# Patient Record
Sex: FEMALE | Race: BLACK OR AFRICAN AMERICAN | NOT HISPANIC OR LATINO | URBAN - METROPOLITAN AREA
[De-identification: names, ages, dates, MRNs, and addresses within clinical notes are randomized per-mention and may not be internally consistent; named-entity substitution may affect disease eponyms.]

---

## 2023-03-07 LAB
CHLAMYDIA TRACH., AMPLIFIED: NEGATIVE
N. GONORRHEA, AMPLIFIED: NEGATIVE
TRICHOMONAS VAGINALIS: NEGATIVE

## 2023-03-08 LAB
ALBUMIN (G/DL) IN SER/PLAS: 4.3 G/DL (ref 3.4–5)
ANION GAP IN SER/PLAS: 12 MMOL/L (ref 10–20)
CALCIUM (MG/DL) IN SER/PLAS: 9.8 MG/DL (ref 8.6–10.6)
CARBON DIOXIDE, TOTAL (MMOL/L) IN SER/PLAS: 28 MMOL/L (ref 21–32)
CHLORIDE (MMOL/L) IN SER/PLAS: 104 MMOL/L (ref 98–107)
CREATININE (MG/DL) IN SER/PLAS: 0.69 MG/DL (ref 0.5–1.05)
GFR FEMALE: >90 ML/MIN/1.73M2
GLUCOSE (MG/DL) IN SER/PLAS: 100 MG/DL (ref 74–99)
PHOSPHATE (MG/DL) IN SER/PLAS: 4 MG/DL (ref 2.5–4.9)
POTASSIUM (MMOL/L) IN SER/PLAS: 4.5 MMOL/L (ref 3.5–5.3)
SODIUM (MMOL/L) IN SER/PLAS: 139 MMOL/L (ref 136–145)
UREA NITROGEN (MG/DL) IN SER/PLAS: 13 MG/DL (ref 6–23)

## 2023-10-06 ENCOUNTER — TELEPHONE (OUTPATIENT)
Dept: PRIMARY CARE | Facility: HOSPITAL | Age: 68
End: 2023-10-06
Payer: COMMERCIAL

## 2023-10-13 ENCOUNTER — PHARMACY VISIT (OUTPATIENT)
Dept: PHARMACY | Facility: CLINIC | Age: 68
End: 2023-10-13
Payer: COMMERCIAL

## 2023-10-13 PROCEDURE — RXMED WILLOW AMBULATORY MEDICATION CHARGE

## 2023-10-19 ENCOUNTER — PHARMACY VISIT (OUTPATIENT)
Dept: PHARMACY | Facility: CLINIC | Age: 68
End: 2023-10-19
Payer: COMMERCIAL

## 2023-10-19 PROCEDURE — RXMED WILLOW AMBULATORY MEDICATION CHARGE

## 2023-11-16 ENCOUNTER — PHARMACY VISIT (OUTPATIENT)
Dept: PHARMACY | Facility: CLINIC | Age: 68
End: 2023-11-16
Payer: COMMERCIAL

## 2023-11-16 PROCEDURE — RXMED WILLOW AMBULATORY MEDICATION CHARGE

## 2023-11-22 ENCOUNTER — OFFICE VISIT (OUTPATIENT)
Dept: PRIMARY CARE | Facility: HOSPITAL | Age: 68
End: 2023-11-22
Payer: COMMERCIAL

## 2023-11-22 VITALS
HEIGHT: 64 IN | TEMPERATURE: 98.4 F | BODY MASS INDEX: 27.66 KG/M2 | WEIGHT: 162 LBS | OXYGEN SATURATION: 99 % | HEART RATE: 60 BPM | DIASTOLIC BLOOD PRESSURE: 89 MMHG | SYSTOLIC BLOOD PRESSURE: 152 MMHG

## 2023-11-22 DIAGNOSIS — Z12.2 SCREENING FOR LUNG CANCER: ICD-10-CM

## 2023-11-22 DIAGNOSIS — E55.9 VITAMIN D DEFICIENCY: Primary | ICD-10-CM

## 2023-11-22 DIAGNOSIS — E78.00 PURE HYPERCHOLESTEROLEMIA: ICD-10-CM

## 2023-11-22 DIAGNOSIS — Z87.891 PERSONAL HISTORY OF NICOTINE DEPENDENCE: ICD-10-CM

## 2023-11-22 DIAGNOSIS — R87.622 PAP SMEAR ABNORMALITY OF VAGINA WITH LGSIL: ICD-10-CM

## 2023-11-22 DIAGNOSIS — R73.03 PRE-DIABETES: ICD-10-CM

## 2023-11-22 LAB
25(OH)D3 SERPL-MCNC: 89 NG/ML (ref 30–100)
ALBUMIN SERPL BCP-MCNC: 4.4 G/DL (ref 3.4–5)
ALP SERPL-CCNC: 78 U/L (ref 33–136)
ALT SERPL W P-5'-P-CCNC: 35 U/L (ref 7–45)
ANION GAP SERPL CALC-SCNC: 14 MMOL/L (ref 10–20)
AST SERPL W P-5'-P-CCNC: 24 U/L (ref 9–39)
BASOPHILS # BLD AUTO: 0.03 X10*3/UL (ref 0–0.1)
BASOPHILS NFR BLD AUTO: 0.6 %
BILIRUB SERPL-MCNC: 0.4 MG/DL (ref 0–1.2)
BUN SERPL-MCNC: 14 MG/DL (ref 6–23)
CALCIUM SERPL-MCNC: 9.3 MG/DL (ref 8.6–10.6)
CHLORIDE SERPL-SCNC: 104 MMOL/L (ref 98–107)
CHOLEST SERPL-MCNC: 241 MG/DL (ref 0–199)
CHOLESTEROL/HDL RATIO: 4.1
CO2 SERPL-SCNC: 26 MMOL/L (ref 21–32)
CREAT SERPL-MCNC: 0.64 MG/DL (ref 0.5–1.05)
EOSINOPHIL # BLD AUTO: 0.05 X10*3/UL (ref 0–0.7)
EOSINOPHIL NFR BLD AUTO: 1 %
ERYTHROCYTE [DISTWIDTH] IN BLOOD BY AUTOMATED COUNT: 12.8 % (ref 11.5–14.5)
EST. AVERAGE GLUCOSE BLD GHB EST-MCNC: 117 MG/DL
GFR SERPL CREATININE-BSD FRML MDRD: >90 ML/MIN/1.73M*2
GLUCOSE SERPL-MCNC: 92 MG/DL (ref 74–99)
HBA1C MFR BLD: 5.7 %
HCT VFR BLD AUTO: 37.6 % (ref 36–46)
HDLC SERPL-MCNC: 59.3 MG/DL
HGB BLD-MCNC: 12 G/DL (ref 12–16)
IMM GRANULOCYTES # BLD AUTO: 0.01 X10*3/UL (ref 0–0.7)
IMM GRANULOCYTES NFR BLD AUTO: 0.2 % (ref 0–0.9)
LYMPHOCYTES # BLD AUTO: 1.84 X10*3/UL (ref 1.2–4.8)
LYMPHOCYTES NFR BLD AUTO: 37.9 %
MCH RBC QN AUTO: 31.5 PG (ref 26–34)
MCHC RBC AUTO-ENTMCNC: 31.9 G/DL (ref 32–36)
MCV RBC AUTO: 99 FL (ref 80–100)
MONOCYTES # BLD AUTO: 0.45 X10*3/UL (ref 0.1–1)
MONOCYTES NFR BLD AUTO: 9.3 %
NEUTROPHILS # BLD AUTO: 2.48 X10*3/UL (ref 1.2–7.7)
NEUTROPHILS NFR BLD AUTO: 51 %
NON-HDL CHOLESTEROL: 182 MG/DL (ref 0–149)
NRBC BLD-RTO: 0 /100 WBCS (ref 0–0)
PLATELET # BLD AUTO: 364 X10*3/UL (ref 150–450)
POTASSIUM SERPL-SCNC: 4.5 MMOL/L (ref 3.5–5.3)
PROT SERPL-MCNC: 7.4 G/DL (ref 6.4–8.2)
RBC # BLD AUTO: 3.81 X10*6/UL (ref 4–5.2)
SODIUM SERPL-SCNC: 139 MMOL/L (ref 136–145)
WBC # BLD AUTO: 4.9 X10*3/UL (ref 4.4–11.3)

## 2023-11-22 PROCEDURE — 80053 COMPREHEN METABOLIC PANEL: CPT | Performed by: INTERNAL MEDICINE

## 2023-11-22 PROCEDURE — 36415 COLL VENOUS BLD VENIPUNCTURE: CPT

## 2023-11-22 PROCEDURE — 83718 ASSAY OF LIPOPROTEIN: CPT

## 2023-11-22 PROCEDURE — 99214 OFFICE O/P EST MOD 30 MIN: CPT | Mod: GC

## 2023-11-22 PROCEDURE — 3077F SYST BP >= 140 MM HG: CPT

## 2023-11-22 PROCEDURE — 82465 ASSAY BLD/SERUM CHOLESTEROL: CPT | Performed by: INTERNAL MEDICINE

## 2023-11-22 PROCEDURE — 99214 OFFICE O/P EST MOD 30 MIN: CPT

## 2023-11-22 PROCEDURE — 82306 VITAMIN D 25 HYDROXY: CPT | Performed by: INTERNAL MEDICINE

## 2023-11-22 PROCEDURE — 3079F DIAST BP 80-89 MM HG: CPT

## 2023-11-22 PROCEDURE — 1126F AMNT PAIN NOTED NONE PRSNT: CPT

## 2023-11-22 PROCEDURE — 80061 LIPID PANEL: CPT

## 2023-11-22 PROCEDURE — 85025 COMPLETE CBC W/AUTO DIFF WBC: CPT | Performed by: INTERNAL MEDICINE

## 2023-11-22 PROCEDURE — 83036 HEMOGLOBIN GLYCOSYLATED A1C: CPT | Performed by: INTERNAL MEDICINE

## 2023-11-22 RX ORDER — ALBUTEROL SULFATE 2 MG/5ML
2 SYRUP ORAL EVERY 6 HOURS
COMMUNITY
End: 2024-05-08 | Stop reason: WASHOUT

## 2023-11-22 RX ORDER — LOSARTAN POTASSIUM 50 MG/1
50 TABLET ORAL DAILY
COMMUNITY
End: 2023-12-20 | Stop reason: SDUPTHER

## 2023-11-22 ASSESSMENT — LIFESTYLE VARIABLES
SKIP TO QUESTIONS 9-10: 1
HOW OFTEN DO YOU HAVE A DRINK CONTAINING ALCOHOL: NEVER
HOW OFTEN DO YOU HAVE SIX OR MORE DRINKS ON ONE OCCASION: NEVER
AUDIT-C TOTAL SCORE: 0
HOW MANY STANDARD DRINKS CONTAINING ALCOHOL DO YOU HAVE ON A TYPICAL DAY: PATIENT DOES NOT DRINK

## 2023-11-22 ASSESSMENT — ENCOUNTER SYMPTOMS
OCCASIONAL FEELINGS OF UNSTEADINESS: 0
LOSS OF SENSATION IN FEET: 0
DEPRESSION: 0

## 2023-11-22 ASSESSMENT — PAIN SCALES - GENERAL: PAINLEVEL: 0-NO PAIN

## 2023-11-22 ASSESSMENT — PATIENT HEALTH QUESTIONNAIRE - PHQ9
2. FEELING DOWN, DEPRESSED OR HOPELESS: NOT AT ALL
1. LITTLE INTEREST OR PLEASURE IN DOING THINGS: NOT AT ALL
SUM OF ALL RESPONSES TO PHQ9 QUESTIONS 1 & 2: 0

## 2023-11-22 NOTE — PATIENT INSTRUCTIONS
As discussed today, our plan is:     Labs - you can get this done today or come back anytime in the next 4-6 weeks at any  facility.  Medication changes : None  Consultations - you were referred to see the following specialists: OBGYN for pap smear and Low Dose CT    Please call [phone] ([phone]) to schedule your appointments and imaging.     Please come back to see me in:   ------  If you have any problems or questions, please contact the clinic at [phone] to leave a message.  Our fax number is [phone]. If your issue cannot wait until the next business day, please go to urgent care or the emergency department.     I also strongly urge all of my patients to register for [facility] by going to: [email or web address]  (The  staff can also send you a text/email link to register when you check out).    No shows: It is understandable if you are unable to make it to a visit, but please cancel your appointment instead of not showing up. This helps to give other patients access to primary care and keeps wait times low.      Dr. Patel Blount

## 2023-11-22 NOTE — PROGRESS NOTES
Chief complaint:    HPI:  Dorinda Benson is a 68 y.o. female w/ pmhx of HTN, HLD, Pre-DM and tobacco use who presents to clinic for follow up.     Pt reported that she went to [place] last week and developed cough, headache, and runny nose. Pt reported that her symptoms have been all improving. Pt has been taking mucinex pill which has been helping her symptoms. Otherwise pt reported that she has been doing well.  Pt reported that she has been taking her BP  at home and is typically in the SBP 130s. Pt reported that her mother has been doing the cooking at home potato's  and other carbs are heavy in there diet. Pt has been taking all other mediations as prescribed. Pt has not been regularly exercising. Pt reported that she has not had to use her albuterol inhaler in months.    Health maintenance:  Health Maintenance   Topic Date Due    Medicare Annual Wellness Visit (AWV)  Never done    COVID-19 Vaccine (1) Never done    Pneumococcal Vaccine: 65+ Years (1 - PCV) Never done    DTaP/Tdap/Td Vaccines (1 - Tdap) Never done    Zoster Vaccines (1 of 2) Never done    Influenza Vaccine (1) Never done    Diabetes: Hemoglobin A1C  01/12/2024    Mammogram  02/13/2024    Bone Density Scan  04/27/2024    Diabetes Screening  01/12/2026    Lipid Panel  01/24/2028    Colorectal Cancer Screening  02/22/2033    Hepatitis C Screening  Completed    HIB Vaccines  Aged Out    Hepatitis B Vaccines  Aged Out    IPV Vaccines  Aged Out    Hepatitis A Vaccines  Aged Out    Meningococcal Vaccine  Aged Out    Rotavirus Vaccines  Aged Out    HPV Vaccines  Aged Out    Irritable Bowel Syndrome  Discontinued       Medications:    Current Outpatient Medications:     albuterol 2 mg/5 mL syrup, Take 5 mL (2 mg) by mouth every 6 hours., Disp: , Rfl:     albuterol 90 mcg/actuation inhaler, INHALE 2 PUFFS BY MOUTH AND INTO THE LUNGS EVERY 4-6 HOURS AS NEEDED, Disp: 34 g, Rfl: 3    amLODIPine (Norvasc) 5 mg tablet, TAKE 1 TABLET BY MOUTH ONCE DAILY, Disp:  90 tablet, Rfl: 2    atorvastatin (Lipitor) 40 mg tablet, TAKE 1 TABLET BY MOUTH AT BEDTIME, Disp: 30 tablet, Rfl: 4    cholecalciferol (Vitamin D-3) 1,250 mcg (50,000 unit) capsule, TAKE 1 CAPSULE BY MOUTH ONCE WEEKLY, Disp: 12 capsule, Rfl: 0    fluticasone (Flonase) 50 mcg/actuation nasal spray, USE 2 SPRAYS IN EACH NOSTRIL ONCE DAILY, Disp: 48 g, Rfl: 0    losartan (Cozaar) 50 mg tablet, Take 1 tablet (50 mg) by mouth once daily., Disp: , Rfl:     Allergies:  Allergies   Allergen Reactions    Latex Hives and Itching       Past medical history:  No past medical history on file.    Surgical history:  No past surgical history on file.    Family history:  No family history on file.    Social history:   reports that she has been smoking cigarettes. She has been smoking an average of .5 packs per day. She uses smokeless tobacco.Tobacco:  4 cigs in evening time, prior was smoking 8 in the evening 48 years  EOTH: 2 beers per day  Rec:  Marijuana 1 per month    Review of systems:  Constitutional: negative for fevers, chills, weight loss, weight gain, change in appetite, fatigue, weakness.  HEENT: negative for + headache, changes in vision or hearing, congestion, sore throat.  Respiratory: negative for SOB, cough, hemoptysis, wheezing  Cardiovascular: negative for chest pain, palpitations, orthopnea, PND  GI: negative for dysphagia, abdominal pain, nausea, vomiting, diarrhea, constipation, melena, hematochezia, BRBPR  : negative for frequency, urgency, dysuria, hematuria, incontinence  MSK: negative for myalgia, arthralgia, decreased joint ROM, LE swelling  Skin: negative for rash, wounds  Heme/lymph: negative for easy bruising, bleeding, epistaxis  Neuro: negative for LOC, numbness, tingling, tremor, vertigo, dizziness    Vitals:  Vitals:    11/22/23 1518   BP: 152/89   Pulse:    Temp:    SpO2:        Physical exam:  Constitutional: Well-developed female in no acute distress.  HEENT: Normocephalic, atraumatic. PERRL.  EOMI. No cervical lymphadenopathy.  Respiratory: CTA bilaterally. No wheezes, rales, or rhonchi. Normal respiratory effort.  Cardiovascular: RRR. No murmurs, gallops, or rubs. No JVD. Radial pulses 2+.  Abdominal: Soft, nondistended, nontender to palpation. Bowel sounds present. No hepatosplenomegaly or masses. No CVA tenderness.  Neuro: CN II-XII intact. UE and LE strength 5/5 bilaterally and sensation intact. Normal FTN testing.  MSK: No LE edema bilaterally.  Skin: Warm, dry. No rashes or wounds.  Psych: Appropriate mood and affect.    Labs:  No results found for this or any previous visit (from the past 24 hour(s)).    Imaging:  No results found.    Assessment and plan:  Pt is a 68 year old lady has HTN, thyroid disease, depression/anxiety presenting for follow-up from last visit.     Addressed this visit:  -Repeated Vit D/Lipid/A1C/CBC/CMP    #Hypertension:  Has BP kit and asked to document measurements and will measure again next visit    Plan:  -Hypertensive in office today, ordered nursing visit in 1 month to recheck BP  -Continue losartan to 50mg valente and amlo 5mg     #Pre-DM:  A1C 5.9% Jan/2023, UA clear  Plan:   -Repeated at this visit, if worsening will start Metformin     #HLD:   Total Randi 250, Non-HDL 180s, ASCVD 27.2%  Plan:  -Continue Atorva 40mg daily and repeat today and might increase Atorva 80mg daily if not improving      #Smoking:  Smokes for the past > 30 years, 0.5 pack a day cut down to 3-4 cigs a day for the past year, prefers to stop on her own, does not want nicotine patches.     #Vit D deficiency:   Latest Vit D level 18, finished 50k a week for 2 months  Plan: Continue to 50k a month and repeat Vit D today     #Thyroid disease:  Unclear, likely hypothyroid, TSH wnl.  Plan:  No need for medications now, will repeat 1-2 years.     #Anxiety/Depression:  Reports taking something that didn't help prefer getting resources for now and no medications, given resource for mental health care by       #Recurrent Bronchitis   Smoker and was on albuterol and singular, Asthma versus COPD.  PFT normal, will observe and advise to stop smoking     Health Maintenance:  -Screening:   C-scope Feb/2023: some polyps removed, tubuluar adenoma, due tin 3-5 years, [id number]  Mammogram-Feb/2023-WNL- repeat 2/2024  Low dose CT need annual repeat, due in Feb/2024 (Few small bilateral noncalcified pulmonary nodules measuring up to 5mm)  Pap smear in March/2023, negative per Gyn last Pap smear patient needs, due for HPV+Pap test on 4/2024  Low dose lung cancer screening: Less than 20 pack year smoking hx     -Disease:   Osteoporosis: DEXA screening normal March/2023  Pre-DM (see above), Lipid (see above), TSH wnl, Vit D 18.     -Infection: HIV and Hep B/C negative.      -Vaccines:   Flu and Pneumococcal 20 Jan/2023, 2 Shingles and Tdap Jan/2023, due for another Shingrix in 2-6 months from Jan/24th/2023.        Follow-up in 6 months. RN visit in 1 month.     Patient and plan discussed with attending physician Dr. Bishop.    Patel Blount MD  PGY-2 Internal Medicine  [facility]

## 2023-11-24 NOTE — PROGRESS NOTES
I saw and evaluated the patient. I personally obtained the key and critical portions of the history and physical exam or was physically present for key and critical portions performed by the resident/fellow. I reviewed the resident/fellow's documentation and discussed the patient with the resident/fellow. I agree with the resident/fellow's medical decision making as documented in the note.    Yasmeen Bishop MD MPH

## 2023-11-27 LAB
CHOLEST SERPL-MCNC: 241 MG/DL (ref 0–199)
CHOLESTEROL/HDL RATIO: 4
HDLC SERPL-MCNC: 60.2 MG/DL
LDLC SERPL CALC-MCNC: 146 MG/DL
NON HDL CHOLESTEROL: 181 MG/DL (ref 0–149)
TRIGL SERPL-MCNC: 174 MG/DL (ref 0–149)
VLDL: 35 MG/DL (ref 0–40)

## 2023-11-28 ENCOUNTER — TELEPHONE (OUTPATIENT)
Dept: PRIMARY CARE | Facility: HOSPITAL | Age: 68
End: 2023-11-28
Payer: COMMERCIAL

## 2023-11-28 NOTE — TELEPHONE ENCOUNTER
Attempted to call patient on all available numbers but, patient was not reachable. Next provider please consider increasing to atorvastatin 80mg daily given cholesterol has been well controlled with the 40mg.

## 2023-12-01 PROBLEM — R80.9 PROTEINURIA: Status: ACTIVE | Noted: 2023-12-01

## 2023-12-01 PROBLEM — Z20.822 SUSPECTED SEVERE ACUTE RESPIRATORY SYNDROME CORONAVIRUS 2 (SARS-COV-2) INFECTION: Status: ACTIVE | Noted: 2021-08-12

## 2023-12-01 RX ORDER — TRIAMTERENE AND HYDROCHLOROTHIAZIDE 37.5; 25 MG/1; MG/1
CAPSULE ORAL
COMMUNITY
Start: 2023-01-24 | End: 2024-05-08 | Stop reason: WASHOUT

## 2023-12-01 RX ORDER — CETIRIZINE HYDROCHLORIDE 10 MG/1
TABLET ORAL
COMMUNITY
Start: 2017-03-28

## 2023-12-01 RX ORDER — AMOXICILLIN 500 MG/1
CAPSULE ORAL
COMMUNITY
Start: 2017-03-28 | End: 2024-05-08 | Stop reason: WASHOUT

## 2023-12-01 RX ORDER — GUAIFENESIN, PSEUDOEPHEDRINE HYDROCHLORIDE 600; 60 MG/1; MG/1
TABLET, EXTENDED RELEASE ORAL
COMMUNITY
Start: 2017-03-28 | End: 2024-05-08 | Stop reason: WASHOUT

## 2023-12-20 PROCEDURE — RXMED WILLOW AMBULATORY MEDICATION CHARGE

## 2023-12-21 ENCOUNTER — PHARMACY VISIT (OUTPATIENT)
Dept: PHARMACY | Facility: CLINIC | Age: 68
End: 2023-12-21
Payer: COMMERCIAL

## 2024-01-03 ENCOUNTER — CLINICAL SUPPORT (OUTPATIENT)
Dept: PRIMARY CARE | Facility: HOSPITAL | Age: 69
End: 2024-01-03
Payer: COMMERCIAL

## 2024-01-03 VITALS — SYSTOLIC BLOOD PRESSURE: 145 MMHG | HEART RATE: 66 BPM | DIASTOLIC BLOOD PRESSURE: 86 MMHG

## 2024-01-03 DIAGNOSIS — I10 BENIGN ESSENTIAL HYPERTENSION: ICD-10-CM

## 2024-01-03 PROCEDURE — 99211 OFF/OP EST MAY X REQ PHY/QHP: CPT | Performed by: INTERNAL MEDICINE

## 2024-01-03 PROCEDURE — 99211 OFF/OP EST MAY X REQ PHY/QHP: CPT | Mod: 25

## 2024-01-03 NOTE — PROGRESS NOTES
Dorinda Marcelino reports to Marc Jones as an established patient on the nurse's schedule for blood pressure check after an elevated bp reading at the 11/22/2023 office visit of 152/89 with a heart rate of 60. A & O x 4, ambulates independently without assistance. Identifiers x 2, in no acute distress. No fever, cough, flu or covid symptoms reported. Reports no adverse reactions or side effects to the current treatment plan of amlodipine 5 mg daily and Losartan 50 mg daily. Today's blood pressure reading is 145/86 with a heart rate of 66 . Results discussed with Dr. Rodríguez, recommendations are to continue the amlodipine 5 mg with the Losartan 50 mg daily, in addition to monitoring salt intake. Patient agreeable to the treatment plan.  Discharged from clinic in stable condition.

## 2024-01-16 PROCEDURE — RXMED WILLOW AMBULATORY MEDICATION CHARGE

## 2024-01-18 ENCOUNTER — PHARMACY VISIT (OUTPATIENT)
Dept: PHARMACY | Facility: CLINIC | Age: 69
End: 2024-01-18
Payer: COMMERCIAL

## 2024-02-16 PROCEDURE — RXMED WILLOW AMBULATORY MEDICATION CHARGE

## 2024-02-19 ENCOUNTER — PHARMACY VISIT (OUTPATIENT)
Dept: PHARMACY | Facility: CLINIC | Age: 69
End: 2024-02-19
Payer: COMMERCIAL

## 2024-03-27 PROCEDURE — RXMED WILLOW AMBULATORY MEDICATION CHARGE

## 2024-03-28 ENCOUNTER — PHARMACY VISIT (OUTPATIENT)
Dept: PHARMACY | Facility: CLINIC | Age: 69
End: 2024-03-28
Payer: COMMERCIAL

## 2024-04-10 ENCOUNTER — APPOINTMENT (OUTPATIENT)
Dept: OBSTETRICS AND GYNECOLOGY | Facility: HOSPITAL | Age: 69
End: 2024-04-10
Payer: COMMERCIAL

## 2024-04-30 PROCEDURE — RXMED WILLOW AMBULATORY MEDICATION CHARGE

## 2024-05-02 ENCOUNTER — PHARMACY VISIT (OUTPATIENT)
Dept: PHARMACY | Facility: CLINIC | Age: 69
End: 2024-05-02
Payer: COMMERCIAL

## 2024-05-03 ENCOUNTER — HOSPITAL ENCOUNTER (EMERGENCY)
Facility: HOSPITAL | Age: 69
Discharge: HOME | End: 2024-05-03
Attending: EMERGENCY MEDICINE
Payer: COMMERCIAL

## 2024-05-03 VITALS
TEMPERATURE: 98 F | HEART RATE: 61 BPM | WEIGHT: 162 LBS | HEIGHT: 64 IN | SYSTOLIC BLOOD PRESSURE: 139 MMHG | OXYGEN SATURATION: 99 % | BODY MASS INDEX: 27.66 KG/M2 | DIASTOLIC BLOOD PRESSURE: 81 MMHG | RESPIRATION RATE: 16 BRPM

## 2024-05-03 DIAGNOSIS — R21 RASH: Primary | ICD-10-CM

## 2024-05-03 LAB
BASOPHILS # BLD AUTO: 0.03 X10*3/UL (ref 0–0.1)
BASOPHILS NFR BLD AUTO: 0.6 %
EOSINOPHIL # BLD AUTO: 0.23 X10*3/UL (ref 0–0.7)
EOSINOPHIL NFR BLD AUTO: 4.6 %
ERYTHROCYTE [DISTWIDTH] IN BLOOD BY AUTOMATED COUNT: 13.1 % (ref 11.5–14.5)
HCT VFR BLD AUTO: 36 % (ref 36–46)
HGB BLD-MCNC: 12.3 G/DL (ref 12–16)
HIV 1+2 AB+HIV1 P24 AG SERPL QL IA: NONREACTIVE
IMM GRANULOCYTES # BLD AUTO: 0.02 X10*3/UL (ref 0–0.7)
IMM GRANULOCYTES NFR BLD AUTO: 0.4 % (ref 0–0.9)
LYMPHOCYTES # BLD AUTO: 1.65 X10*3/UL (ref 1.2–4.8)
LYMPHOCYTES NFR BLD AUTO: 32.9 %
MCH RBC QN AUTO: 32.5 PG (ref 26–34)
MCHC RBC AUTO-ENTMCNC: 34.2 G/DL (ref 32–36)
MCV RBC AUTO: 95 FL (ref 80–100)
MONOCYTES # BLD AUTO: 0.58 X10*3/UL (ref 0.1–1)
MONOCYTES NFR BLD AUTO: 11.6 %
NEUTROPHILS # BLD AUTO: 2.5 X10*3/UL (ref 1.2–7.7)
NEUTROPHILS NFR BLD AUTO: 49.9 %
NRBC BLD-RTO: 0 /100 WBCS (ref 0–0)
PLATELET # BLD AUTO: 259 X10*3/UL (ref 150–450)
RBC # BLD AUTO: 3.79 X10*6/UL (ref 4–5.2)
TREPONEMA PALLIDUM IGG+IGM AB [PRESENCE] IN SERUM OR PLASMA BY IMMUNOASSAY: NONREACTIVE
WBC # BLD AUTO: 5 X10*3/UL (ref 4.4–11.3)

## 2024-05-03 PROCEDURE — 86780 TREPONEMA PALLIDUM: CPT

## 2024-05-03 PROCEDURE — 87593 ORTHOPOXVIRUS AMP PRB EACH: CPT

## 2024-05-03 PROCEDURE — 99284 EMERGENCY DEPT VISIT MOD MDM: CPT

## 2024-05-03 PROCEDURE — 99283 EMERGENCY DEPT VISIT LOW MDM: CPT

## 2024-05-03 PROCEDURE — 36415 COLL VENOUS BLD VENIPUNCTURE: CPT

## 2024-05-03 PROCEDURE — 87389 HIV-1 AG W/HIV-1&-2 AB AG IA: CPT

## 2024-05-03 PROCEDURE — 85025 COMPLETE CBC W/AUTO DIFF WBC: CPT

## 2024-05-03 RX ORDER — CEPHALEXIN 500 MG/1
500 CAPSULE ORAL 2 TIMES DAILY
Qty: 14 CAPSULE | Refills: 0 | Status: SHIPPED | OUTPATIENT
Start: 2024-05-03 | End: 2024-05-08 | Stop reason: WASHOUT

## 2024-05-03 RX ORDER — SULFAMETHOXAZOLE AND TRIMETHOPRIM 800; 160 MG/1; MG/1
1 TABLET ORAL EVERY 12 HOURS
Qty: 14 TABLET | Refills: 0 | Status: SHIPPED | OUTPATIENT
Start: 2024-05-03 | End: 2024-05-08 | Stop reason: WASHOUT

## 2024-05-03 ASSESSMENT — COLUMBIA-SUICIDE SEVERITY RATING SCALE - C-SSRS
6. HAVE YOU EVER DONE ANYTHING, STARTED TO DO ANYTHING, OR PREPARED TO DO ANYTHING TO END YOUR LIFE?: NO
1. IN THE PAST MONTH, HAVE YOU WISHED YOU WERE DEAD OR WISHED YOU COULD GO TO SLEEP AND NOT WAKE UP?: NO
2. HAVE YOU ACTUALLY HAD ANY THOUGHTS OF KILLING YOURSELF?: NO

## 2024-05-03 ASSESSMENT — PAIN - FUNCTIONAL ASSESSMENT: PAIN_FUNCTIONAL_ASSESSMENT: 0-10

## 2024-05-03 ASSESSMENT — PAIN SCALES - GENERAL: PAINLEVEL_OUTOF10: 6

## 2024-05-03 NOTE — ED TRIAGE NOTES
Patient states that she started having itching on Monday and realized that she had an insect bite on the inside of her L leg and then in 3 other places

## 2024-05-03 NOTE — ED PROVIDER NOTES
"HPI   Chief Complaint   Patient presents with    Insect Bite       HPI     Dorinda smith is a 68-year-old female with no significant past medical history presenting with \" insect bites\" on her left leg.  The patient states the bites began on Monday and the first 1 was on the inside of her right thigh.  The patient states the second bite appeared on Thursday and another 1 popped up on Wednesday.  The patient states the bites have been growing since they appeared.  Patient states that she notices the new bites when she wakes up in the morning.  The patient states that the bites are itchy and painful as well.  The patient states that she has used Benadryl cream on the bites but it has not provided relief.  The patient states she had similar bites last summer.  She states she did not get treatment from them and they went away after about 2 weeks.  Patient states the bites left a scab after they resolved.  The patient states that she was staying with her aunt when these bites occurred and this time she is staying with her mom.  Patient denies fevers, body aches, chills, chest pain, shortness of breath, nausea, vomiting, abdominal pain.               [place] Coma Scale Score: 15                     Patient History   No past medical history on file.  No past surgical history on file.  No family history on file.  Social History     Tobacco Use    Smoking status: Every Day     Current packs/day: 0.50     Types: Cigarettes    Smokeless tobacco: Current   Substance Use Topics    Alcohol use: Not on file    Drug use: Not on file       Physical Exam   ED Triage Vitals [05/03/24 1728]   Temperature Heart Rate Respirations BP   36.7 °C (98 °F) 61 16 139/81      Pulse Ox Temp src Heart Rate Source Patient Position   99 % -- -- --      BP Location FiO2 (%)     -- --       Physical Exam  Constitutional:       Appearance: Normal appearance.   Cardiovascular:      Rate and Rhythm: Normal rate and regular rhythm.      Heart sounds: " Normal heart sounds. No murmur heard.     No gallop.   Pulmonary:      Effort: Pulmonary effort is normal.      Breath sounds: Normal breath sounds. No stridor. No wheezing, rhonchi or rales.   Skin:     Comments: Please see image of the insect bites/lesions under the media tab.  The lesions are warm to touch and tender    Neurological:      General: No focal deficit present.      Mental Status: She is alert and oriented to person, place, and time.   Psychiatric:         Mood and Affect: Mood normal.         Behavior: Behavior normal.         ED Course & MDM   Diagnoses as of 05/04/24 0338   Rash       Medical Decision Making  This is a 68-year-old female presenting with lesions that she describes as insect bites on her left lower leg.  The patient states the bites began on Monday and each day she woke up and discovered a new one.  Patient states the bites are painful and itchy.  The patient states she used Benadryl cream to relieve the itchiness however it has not helped.  She denies fever, body aches, chills.  The patient's lesions do not appear to be from an insect bite.  The lesions do not appear to be from bedbugs, scabies, brown recluse or black  spiders, mosquitoes.  CBC was obtained to evaluate for possible infection from the bites.  The patient's CBC did not show elevation of white blood cell counts therefore infection is not suspected.  Patient's white blood cell count was also not low and immunodeficiency was not suspected as a cause for the patient's rash.  Syphilis and HIV labs were obtained for rule out of the cause of the lesions.  Monkeypox culture was also obtained for possible etiology.    Disposition: Discharge home  The patient was advised to follow-up with her primary care provider in which she has an appointment with on May 8 for further evaluation of the rash.  The patient was also given a referral for a dermatologist.  The patient was given a prescription for Keflex and Bactrim to  prevent infection.  Plan was discussed with the patient and the patient understands and agrees.    The patient was discussed and staffed with Dr. Damir Edouard PA-C  05/04/24 3100

## 2024-05-04 PROCEDURE — RXMED WILLOW AMBULATORY MEDICATION CHARGE

## 2024-05-04 NOTE — DISCHARGE INSTRUCTIONS
Please follow up with your primary care provider at your up coming appointment on May 8th.  Please follow up with a dermatologist as well

## 2024-05-05 ENCOUNTER — PHARMACY VISIT (OUTPATIENT)
Dept: PHARMACY | Facility: CLINIC | Age: 69
End: 2024-05-05
Payer: COMMERCIAL

## 2024-05-07 LAB
ORTHOPOXVIRUS DNA SPEC QL NAA+PROBE: NOT DETECTED
SPECIMEN SOURCE: NORMAL

## 2024-05-08 ENCOUNTER — OFFICE VISIT (OUTPATIENT)
Dept: PRIMARY CARE | Facility: HOSPITAL | Age: 69
End: 2024-05-08
Payer: COMMERCIAL

## 2024-05-08 ENCOUNTER — PHARMACY VISIT (OUTPATIENT)
Dept: PHARMACY | Facility: CLINIC | Age: 69
End: 2024-05-08
Payer: COMMERCIAL

## 2024-05-08 ENCOUNTER — OFFICE VISIT (OUTPATIENT)
Dept: DERMATOLOGY | Facility: CLINIC | Age: 69
End: 2024-05-08
Payer: COMMERCIAL

## 2024-05-08 VITALS
HEART RATE: 57 BPM | HEIGHT: 64 IN | TEMPERATURE: 98.2 F | WEIGHT: 163 LBS | SYSTOLIC BLOOD PRESSURE: 150 MMHG | DIASTOLIC BLOOD PRESSURE: 88 MMHG | OXYGEN SATURATION: 98 % | BODY MASS INDEX: 27.83 KG/M2

## 2024-05-08 DIAGNOSIS — R59.9 ENLARGED LYMPH NODES, UNSPECIFIED: ICD-10-CM

## 2024-05-08 DIAGNOSIS — E78.00 PURE HYPERCHOLESTEROLEMIA: ICD-10-CM

## 2024-05-08 DIAGNOSIS — I10 PRIMARY HYPERTENSION: ICD-10-CM

## 2024-05-08 DIAGNOSIS — K02.9 DENTAL CARIES: ICD-10-CM

## 2024-05-08 DIAGNOSIS — R21 RASH AND OTHER NONSPECIFIC SKIN ERUPTION: Primary | ICD-10-CM

## 2024-05-08 DIAGNOSIS — Z00.00 HEALTHCARE MAINTENANCE: ICD-10-CM

## 2024-05-08 DIAGNOSIS — F17.210 NICOTINE DEPENDENCE, CIGARETTES, UNCOMPLICATED: ICD-10-CM

## 2024-05-08 DIAGNOSIS — R21 RASH: Primary | ICD-10-CM

## 2024-05-08 LAB
ALBUMIN SERPL BCP-MCNC: 4.3 G/DL (ref 3.4–5)
ALP SERPL-CCNC: 86 U/L (ref 33–136)
ALT SERPL W P-5'-P-CCNC: 43 U/L (ref 7–45)
ANION GAP SERPL CALC-SCNC: 14 MMOL/L (ref 10–20)
AST SERPL W P-5'-P-CCNC: 41 U/L (ref 9–39)
BASOPHILS # BLD AUTO: 0.04 X10*3/UL (ref 0–0.1)
BASOPHILS NFR BLD AUTO: 0.9 %
BILIRUB SERPL-MCNC: 0.2 MG/DL (ref 0–1.2)
BUN SERPL-MCNC: 11 MG/DL (ref 6–23)
CALCIUM SERPL-MCNC: 9.9 MG/DL (ref 8.6–10.6)
CHLORIDE SERPL-SCNC: 106 MMOL/L (ref 98–107)
CHOLEST SERPL-MCNC: 226 MG/DL (ref 0–199)
CHOLESTEROL/HDL RATIO: 3.5
CO2 SERPL-SCNC: 26 MMOL/L (ref 21–32)
CREAT SERPL-MCNC: 0.84 MG/DL (ref 0.5–1.05)
CRP SERPL-MCNC: 0.91 MG/DL
EGFRCR SERPLBLD CKD-EPI 2021: 77 ML/MIN/1.73M*2
EOSINOPHIL # BLD AUTO: 0.12 X10*3/UL (ref 0–0.7)
EOSINOPHIL NFR BLD AUTO: 2.8 %
ERYTHROCYTE [DISTWIDTH] IN BLOOD BY AUTOMATED COUNT: 13.6 % (ref 11.5–14.5)
ERYTHROCYTE [SEDIMENTATION RATE] IN BLOOD BY WESTERGREN METHOD: 42 MM/H (ref 0–30)
EST. AVERAGE GLUCOSE BLD GHB EST-MCNC: 123 MG/DL
GLUCOSE SERPL-MCNC: 87 MG/DL (ref 74–99)
HBA1C MFR BLD: 5.9 %
HCT VFR BLD AUTO: 38.1 % (ref 36–46)
HDLC SERPL-MCNC: 65.4 MG/DL
HGB BLD-MCNC: 12.3 G/DL (ref 12–16)
IMM GRANULOCYTES # BLD AUTO: 0.03 X10*3/UL (ref 0–0.7)
IMM GRANULOCYTES NFR BLD AUTO: 0.7 % (ref 0–0.9)
LYMPHOCYTES # BLD AUTO: 1.82 X10*3/UL (ref 1.2–4.8)
LYMPHOCYTES NFR BLD AUTO: 42.5 %
MCH RBC QN AUTO: 31.9 PG (ref 26–34)
MCHC RBC AUTO-ENTMCNC: 32.3 G/DL (ref 32–36)
MCV RBC AUTO: 99 FL (ref 80–100)
MONOCYTES # BLD AUTO: 0.71 X10*3/UL (ref 0.1–1)
MONOCYTES NFR BLD AUTO: 16.6 %
NEUTROPHILS # BLD AUTO: 1.56 X10*3/UL (ref 1.2–7.7)
NEUTROPHILS NFR BLD AUTO: 36.5 %
NON-HDL CHOLESTEROL: 161 MG/DL (ref 0–149)
NRBC BLD-RTO: 0 /100 WBCS (ref 0–0)
PLATELET # BLD AUTO: 313 X10*3/UL (ref 150–450)
POTASSIUM SERPL-SCNC: 5.3 MMOL/L (ref 3.5–5.3)
PROT SERPL-MCNC: 7.5 G/DL (ref 6.4–8.2)
RBC # BLD AUTO: 3.86 X10*6/UL (ref 4–5.2)
SODIUM SERPL-SCNC: 141 MMOL/L (ref 136–145)
TSH SERPL-ACNC: 1.67 MIU/L (ref 0.44–3.98)
WBC # BLD AUTO: 4.3 X10*3/UL (ref 4.4–11.3)

## 2024-05-08 PROCEDURE — 1159F MED LIST DOCD IN RCRD: CPT | Performed by: STUDENT IN AN ORGANIZED HEALTH CARE EDUCATION/TRAINING PROGRAM

## 2024-05-08 PROCEDURE — 83036 HEMOGLOBIN GLYCOSYLATED A1C: CPT

## 2024-05-08 PROCEDURE — 85652 RBC SED RATE AUTOMATED: CPT

## 2024-05-08 PROCEDURE — 99204 OFFICE O/P NEW MOD 45 MIN: CPT | Performed by: STUDENT IN AN ORGANIZED HEALTH CARE EDUCATION/TRAINING PROGRAM

## 2024-05-08 PROCEDURE — 85025 COMPLETE CBC W/AUTO DIFF WBC: CPT

## 2024-05-08 PROCEDURE — 3079F DIAST BP 80-89 MM HG: CPT

## 2024-05-08 PROCEDURE — 99214 OFFICE O/P EST MOD 30 MIN: CPT

## 2024-05-08 PROCEDURE — 80053 COMPREHEN METABOLIC PANEL: CPT

## 2024-05-08 PROCEDURE — 99214 OFFICE O/P EST MOD 30 MIN: CPT | Mod: GC

## 2024-05-08 PROCEDURE — 1126F AMNT PAIN NOTED NONE PRSNT: CPT

## 2024-05-08 PROCEDURE — 11104 PUNCH BX SKIN SINGLE LESION: CPT | Performed by: STUDENT IN AN ORGANIZED HEALTH CARE EDUCATION/TRAINING PROGRAM

## 2024-05-08 PROCEDURE — RXMED WILLOW AMBULATORY MEDICATION CHARGE

## 2024-05-08 PROCEDURE — 86036 ANCA SCREEN EACH ANTIBODY: CPT | Mod: 91

## 2024-05-08 PROCEDURE — 84443 ASSAY THYROID STIM HORMONE: CPT

## 2024-05-08 PROCEDURE — 11105 PUNCH BX SKIN EA SEP/ADDL: CPT | Performed by: STUDENT IN AN ORGANIZED HEALTH CARE EDUCATION/TRAINING PROGRAM

## 2024-05-08 PROCEDURE — 83718 ASSAY OF LIPOPROTEIN: CPT

## 2024-05-08 PROCEDURE — 1159F MED LIST DOCD IN RCRD: CPT

## 2024-05-08 PROCEDURE — 86038 ANTINUCLEAR ANTIBODIES: CPT

## 2024-05-08 PROCEDURE — 3077F SYST BP >= 140 MM HG: CPT

## 2024-05-08 PROCEDURE — 36415 COLL VENOUS BLD VENIPUNCTURE: CPT

## 2024-05-08 PROCEDURE — 86140 C-REACTIVE PROTEIN: CPT

## 2024-05-08 RX ORDER — FLUTICASONE PROPIONATE 50 MCG
2 SPRAY, SUSPENSION (ML) NASAL DAILY
Qty: 48 G | Refills: 3 | Status: SHIPPED | OUTPATIENT
Start: 2024-05-08 | End: 2025-05-07

## 2024-05-08 RX ORDER — LOSARTAN POTASSIUM 100 MG/1
100 TABLET ORAL DAILY
Qty: 30 TABLET | Refills: 11 | Status: SHIPPED | OUTPATIENT
Start: 2024-05-08 | End: 2025-05-03

## 2024-05-08 RX ORDER — ATORVASTATIN CALCIUM 80 MG/1
80 TABLET, FILM COATED ORAL DAILY
Qty: 30 TABLET | Refills: 11 | Status: SHIPPED | OUTPATIENT
Start: 2024-05-08 | End: 2024-05-14 | Stop reason: WASHOUT

## 2024-05-08 RX ORDER — CLOBETASOL PROPIONATE 0.5 MG/G
OINTMENT TOPICAL 2 TIMES DAILY
Qty: 60 G | Refills: 3 | Status: SHIPPED | OUTPATIENT
Start: 2024-05-08 | End: 2024-05-22

## 2024-05-08 RX ORDER — FLUOCINONIDE TOPICAL SOLUTION USP, 0.05% 0.5 MG/ML
SOLUTION TOPICAL 2 TIMES DAILY PRN
Qty: 60 ML | Refills: 2 | Status: SHIPPED | OUTPATIENT
Start: 2024-05-08 | End: 2025-05-08

## 2024-05-08 RX ORDER — ASPIRIN 325 MG
50000 TABLET, DELAYED RELEASE (ENTERIC COATED) ORAL
Qty: 12 CAPSULE | Refills: 0 | Status: SHIPPED | OUTPATIENT
Start: 2024-05-08 | End: 2025-05-08

## 2024-05-08 ASSESSMENT — ENCOUNTER SYMPTOMS
DEPRESSION: 0
LOSS OF SENSATION IN FEET: 0
OCCASIONAL FEELINGS OF UNSTEADINESS: 0

## 2024-05-08 ASSESSMENT — PAIN SCALES - GENERAL: PAINLEVEL: 0-NO PAIN

## 2024-05-08 ASSESSMENT — PATIENT HEALTH QUESTIONNAIRE - PHQ9
2. FEELING DOWN, DEPRESSED OR HOPELESS: NOT AT ALL
1. LITTLE INTEREST OR PLEASURE IN DOING THINGS: NOT AT ALL
SUM OF ALL RESPONSES TO PHQ9 QUESTIONS 1 AND 2: 0

## 2024-05-08 NOTE — PROGRESS NOTES
Subjective     Dorinda Benson is a 68 y.o. female who presents for the following: Rash (Patient has been having rash flare ups the past 2 months. Lower left leg currently blistering. Irritation begins with itching, then burning and blisters. Has tried benadryl cream; however, did not improve condition.).     Review of Systems:  No other skin or systemic complaints other than what is documented elsewhere in the note.    The following portions of the chart were reviewed this encounter and updated as appropriate:          Skin Cancer History  No skin cancer on file.      Specialty Problems    None       Objective   Well appearing patient in no apparent distress; mood and affect are within normal limits.    A focused skin examination was performed. All findings within normal limits unless otherwise noted below.    Assessment/Plan   1. Rash and other nonspecific skin eruption  Left Knee - Anterior, Left Lower Leg - Anterior  Tense blister x2          Numerous tense blisters on lower extremities  Last spring had similar lesions on arms that resolved  Had spot on left wrist last month that resolved  Discussed ddx: BP / other autoimmune blistering disorder vs bullous arthopod vs bullous ACD vs other  Will perform punch biopsy x2   One lesional biopsy for H&E and one perilesional biopsy for DIF  Start clobetasol 0.05% ointment to active areas. Patient to apply to affected areas 2x daily x 2 weeks then 1 week off, repeat as needed. Side effects of topical steroids were reviewed including risk of skin atrophy.    FU pending biopsy results    Lesion biopsy - Left Lower Leg - Anterior  Type of biopsy: punch    Informed consent: discussed and consent obtained    Timeout: patient name, date of birth, surgical site, and procedure verified    Procedure prep:  Patient was prepped and draped  Anesthesia: the lesion was anesthetized in a standard fashion    Anesthetic:  1% lidocaine w/ epinephrine 1-100,000 local infiltration  Punch  size:  4 mm  Suture size:  5-0  Suture type: fast-absorbing plain gut    Hemostasis achieved with: suture    Outcome: patient tolerated procedure well    Post-procedure details: sterile dressing applied and wound care instructions given    Dressing type: bandage and petrolatum      Lesion biopsy - Left Lower Leg - Anterior  Type of biopsy: punch    Informed consent: discussed and consent obtained    Timeout: patient name, date of birth, surgical site, and procedure verified    Procedure prep:  Patient was prepped and draped  Anesthesia: the lesion was anesthetized in a standard fashion    Anesthetic:  1% lidocaine w/ epinephrine 1-100,000 local infiltration  Punch size:  4 mm  Suture size:  5-0  Suture type: fast-absorbing plain gut    Hemostasis achieved with: suture    Outcome: patient tolerated procedure well    Post-procedure details: sterile dressing applied and wound care instructions given    Dressing type: bandage and petrolatum      Specimen 1 - Dermatopathology- DERM LAB  Differential Diagnosis: BP vs bullous arthropod vs ACD  Check Margins Yes/No?:    Comments:    Dermpath Lab: Routine Histopathology (formalin-fixed tissue)    Specimen 2 - Dermatopathology- DERM LAB  Differential Diagnosis: r/o bp  Check Margins Yes/No?:    Comments:    Dermpath Lab: Direct Immunofluorescence (specimen in Omar's media)    Related Medications  clobetasol (Temovate) 0.05 % ointment  Apply topically 2 times a day for 14 days.

## 2024-05-08 NOTE — PATIENT INSTRUCTIONS
As discussed today, our plan is:     Labs - we collected labs today and will call you with any abnormal results. If you have any questions or concerns prior to us calling feel free to call our office to have your questions addressed.   Medication changes: Losartan 100 daily, Atorvastatin 80 daily, Fluocinonide twice daily  Consultations - you were referred to see the following specialists: Dermatology (If you are not able to make an appointment please call [facility] derm for appointment), OBGYN (Pap smear), Dentist  You should receive a call from central scheduling in the next few days if you do not receive a call within 3-5 business days please call [phone] to schedule your appointment.   Please schedule your mammogram and Chest CT with the number above ([phone] ).     5.   You smoke or use other tobacco products, take steps to quit. Call [phone] for more information or to set up an appointment with  [facility]. The [place] Tobacco Quit Line is a free resource for people who don’t have insurance, receive Medicaid, pregnant women, or members of the [place] [facility]. Call 1-214-QUIT-NOW or 1-926.766.2303.    Please come back to see me in: 3 Months  ------  If you have any problems or questions, please contact the clinic at [phone] to leave a message. Our fax number is [phone]. If your issue cannot wait until the next business day, please go to urgent care or the emergency department.     I also strongly urge all of my patients to register for [facility] by going to: [email or web address]  (The  staff can also send you a text/email link to register when you check out).    No shows: It is understandable if you are unable to make it to a visit, but please cancel your appointment instead of not showing up. This helps to give other patients access to primary care and keeps wait times low.        [facility]   [phone]

## 2024-05-08 NOTE — PROGRESS NOTES
Chief complaint:    HPI:  Dorinda Benson is a 68 y.o. female w/ pmhx of HTN, HLD, Pre-DM and tobacco use who presents to clinic for follow up.     Patient was recently in the ED on 5/3 for blistering rash on left leg. She was tested for HIV, Syphilis and Monkeypox which were all negative. She was discharged home with keflex as well as bactrim and instructed to follow up with PCP and dermatology. She took 2 days of the antibiotics and stopped taking them because they made her nausea's. Today patient reported that she has been having skin blistering for the past 3 years. She reported that it started occurring after she moved to [place] and always occurs in the summer time. She has seasonal allergies but, they stopped this year. The rash started on her left arm with a macular rash with purplish hue, then it becomes puritic/burning sensation and then she gets blisters. She has been using Benadryl in the past but, that was ineffective. She never typically see's bite marks at the site of the skin changes, she denied any changes to her skin routine or products in the summer. She also has never seen a dermatologist for this issue in the past. She denied any other associated symptoms, such as fever, chills chest pain, SOB, N/V or any other symptoms.      Patient reported that her blood pressures at home have been between SBP 150s-160s. Patient reported that she has been complaint with all of her medications.     Health maintenance:  Health Maintenance   Topic Date Due    Medicare Annual Wellness Visit (AWV)  Never done    Hepatitis A Vaccines (1 of 2 - Risk 2-dose series) Never done    RSV Pregnant patients and/or  patients aged 60+ years (1 - 1-dose 60+ series) Never done    COVID-19 Vaccine (1 - 2023-24 season) Never done    Mammogram  02/13/2024    Influenza Vaccine (Season Ended) 09/01/2024    Diabetes: Hemoglobin A1C  11/22/2024    Bone Density Scan  04/27/2025    Diabetes Screening  11/22/2026    Lipid Panel   11/22/2028    DTaP/Tdap/Td Vaccines (2 - Td or Tdap) 01/24/2033    Colorectal Cancer Screening  02/22/2033    Pneumococcal Vaccine: 65+ Years  Completed    Zoster Vaccines  Completed    Hepatitis C Screening  Completed    HIB Vaccines  Aged Out    Hepatitis B Vaccines  Aged Out    IPV Vaccines  Aged Out    Meningococcal Vaccine  Aged Out    Rotavirus Vaccines  Aged Out    HPV Vaccines  Aged Out    Irritable Bowel Syndrome  Discontinued       Medications:    Current Outpatient Medications:     albuterol 90 mcg/actuation inhaler, INHALE 2 PUFFS BY MOUTH AND INTO THE LUNGS EVERY 4-6 HOURS AS NEEDED, Disp: 34 g, Rfl: 3    amLODIPine (Norvasc) 5 mg tablet, TAKE 1 TABLET BY MOUTH ONCE DAILY, Disp: 30 tablet, Rfl: 11    atorvastatin (Lipitor) 80 mg tablet, Take 1 tablet (80 mg) by mouth once daily., Disp: 30 tablet, Rfl: 11    cetirizine (ZyrTEC) 10 mg tablet, Take 1 tablet every day by oral route in the morning., Disp: , Rfl:     cholecalciferol (Vitamin D-3) 50,000 unit capsule, TAKE 1 CAPSULE BY MOUTH ONCE WEEKLY, Disp: 12 capsule, Rfl: 0    fluocinonide (Lidex) 0.05 % external solution, Apply topically 2 times a day as needed for irritation or rash., Disp: 60 mL, Rfl: 2    fluticasone (Flonase) 50 mcg/actuation nasal spray, Administer 2 sprays into each nostril once daily., Disp: 48 g, Rfl: 3    losartan (Cozaar) 100 mg tablet, Take 1 tablet (100 mg) by mouth once daily., Disp: 30 tablet, Rfl: 11    Allergies:  Allergies   Allergen Reactions    Aspirin Other    Latex Hives and Itching       Past medical history:  No past medical history on file.    Surgical history:  No past surgical history on file.    Family history:  Denied family hx of autoimmune conditions  Grandmother sister- Colon cancer    Social history:   reports that she has been smoking cigarettes. She has never used smokeless tobacco. She reports current alcohol use. She reports current drug use. Drug: Marijuana.  Tobacco:  4 cigs in evening time, prior  was smoking 8 in the evening 48 years  EOTH: 2 beers per day  Rec:  Marijuana 1 per month    Vitals:  Vitals:    05/08/24 1317   BP: 150/88   Pulse: 57   Temp: 36.8 °C (98.2 °F)   SpO2: 98%         Physical exam:  Constitutional: Well-developed female in no acute distress.  HEENT: Normocephalic, atraumatic. PERRL. EOMI. No cervical lymphadenopathy.  Respiratory: CTA bilaterally. No wheezes, rales, or rhonchi. Normal respiratory effort.  Cardiovascular: RRR. No murmurs, gallops, or rubs. No JVD. Radial pulses 2+.  Abdominal: Soft, nondistended, nontender to palpation. Bowel sounds present. No hepatosplenomegaly or masses. No CVA tenderness.  Neuro: CN II-XII intact. UE and LE strength 5/5 bilaterally and sensation intact. Normal FTN testing.  MSK: No LE edema bilaterally.  Skin: Blistering rash on left anterior leg  Psych: Appropriate mood and affect.    Labs:  No results found for this or any previous visit (from the past 24 hour(s)).    Imaging:  No results found.    Assessment and plan:       Dorinda Benson is a 68 y.o. female w/ pmhx of HTN, HLD, Pre-DM and tobacco use who presents to clinic for follow up.     Addressed this visit:  -Repeated TSH/Lipid/A1C/CBC/CMP    #Skin Rash  ::HIV, Syphilis, Monkeypox-Negative (5/2024)  :: Ddx Bullous pemphigoid vs Vasculitis related   -Derm referral  -DEMARCO, ANCA, ESR,CRP pending  -Flucorinolone BID     #Hypertension (Uncontrolled)  ::Has BP kit and asked to document measurements and will measure again next visit  Plan:  -Hypertensive in office today  -Increased losartan to 100mg daily and c/w amlo 5mg     #Pre-DM:  A1C 5.7% 11/2023, UA clear  Plan:   -Improved from 5.9%, encouraged continued lifestyle modification, repeat A1C pending     #HLD:   Total Randi 250, Non-HDL 180s, ASCVD 27.2%  Plan:  -Increased Atorva 80mg daily if not improving      #Smoking:  Smokes for the past > 30 years, 0.5 pack a day cut down to 3-4 cigs a day for the past year, prefers to stop on her own,  does not want nicotine patches.     #Vit D deficiency:   Latest Vit D level 89 (11/2023)  Plan: Continue Vit D 50k a month      #Thyroid disease:  Unclear, likely hypothyroid, TSH wnl.  Plan:  No need for medications now, will repeat today     #Anxiety/Depression:  Reports taking something that didn't help prefer getting resources for now and no medications, given resource for mental health care by      #Recurrent Bronchitis   Smoker and was on albuterol and singular, Asthma versus COPD.  PFT normal, will observe and advise to stop smoking     Health Maintenance:  -Screening:   C-scope Feb/2023: some polyps removed, tubuluar adenoma, due tin 3-5 years, [id number]  Mammogram-Feb/2023-WNL- repeat 2/2024  Low dose CT need annual repeat, due in Feb/2024 (Few small bilateral noncalcified pulmonary nodules measuring up to 5mm)  Pap smear in March/2023, negative per Gyn last Pap smear patient needs, due for HPV+Pap test on 4/2024    -Disease:   Osteoporosis: DEXA screening normal March/2023  Pre-DM (see above), Lipid (see above), TSH wnl, Vit D 18.     -Infection: HIV and Hep B/C negative.      -Vaccines:   Flu and Pneumococcal 20 Jan/2023, 2 Shingles and Tdap Jan/2023, due for another Shingrix in 2-6 months from Jan/24th/2023. (Will discuss at next visit)      Follow-up in 3 months.     Patient and plan discussed with attending physician Dr. Rodríguez.     Patel Blount MD  PGY-2 Internal Medicine  [facility]

## 2024-05-13 LAB — ANA SER QL HEP2 SUBST: NEGATIVE

## 2024-05-14 ENCOUNTER — TELEPHONE (OUTPATIENT)
Dept: INTERNAL MEDICINE | Facility: HOSPITAL | Age: 69
End: 2024-05-14
Payer: COMMERCIAL

## 2024-05-14 DIAGNOSIS — E78.49 OTHER HYPERLIPIDEMIA: Primary | ICD-10-CM

## 2024-05-14 LAB
ANCA AB PATTERN SER IF-IMP: NORMAL
ANCA IGG TITR SER IF: NORMAL {TITER}
LAB AP ASR DISCLAIMER: NORMAL
LABORATORY COMMENT REPORT: NORMAL
MYELOPEROXIDASE AB SER-ACNC: 0 AU/ML (ref 0–19)
PATH REPORT.FINAL DX SPEC: NORMAL
PATH REPORT.GROSS SPEC: NORMAL
PATH REPORT.MICROSCOPIC SPEC OTHER STN: NORMAL
PATH REPORT.RELEVANT HX SPEC: NORMAL
PATH REPORT.TOTAL CANCER: NORMAL
PROTEINASE3 AB SER-ACNC: 0 AU/ML (ref 0–19)

## 2024-05-14 RX ORDER — ATORVASTATIN CALCIUM 40 MG/1
40 TABLET, FILM COATED ORAL NIGHTLY
Start: 2024-05-14 | End: 2025-05-14

## 2024-05-14 NOTE — TELEPHONE ENCOUNTER
Discussed labwork with patient. She reported worsening diarrhea with the increased dose of Atorvastatin 80mg. I agree to reducing her to the 40mg she was on previously given her Lipid profile improved on the 40 and recommended lifestyle modifications. Would recheck lipid profile at next visit. Rash is currently improving with topical steroid and derm pathology is currently pending.

## 2024-05-15 NOTE — PROGRESS NOTES
"Ms. Benson is a 69 yo woman here for chief complaint of blistering skin.     It has been going on for 3 years on/off, ever since she moved to [place]. She was seen in the ED last week for it but not definitive diagnosis was made. She had labs that included a negative HIV, RPR, monkey pox.    She reports these large blistering lesions that scar once resolved. They are intensely itchy and mostly on her limbs (arms and legs, more on left side than right). They are not in areas of trauma or shear.    PMHx  HTN  Prediabetes  Hypercholesterolemia, on treatment  Cigarette use, ongoing    Exam:  /88 (BP Location: Right arm, Patient Position: Sitting, BP Cuff Size: Adult)   Pulse 57   Temp 36.8 °C (98.2 °F) (Temporal)   Ht 1.626 m (5' 4\")   Wt 73.9 kg (163 lb)   SpO2 98%   BMI 27.98 kg/m²   Large bullae on legs, coin-shaped hyperpigmented scars on left upper arm, early lesion on left wrist with red raised papules, excoriations.  Lungs clear  CV RR  Abd soft NT  Ext no edema    A/P    Highly suspicious for bullous pemphigoid. Will treat with medium to high potency topical steroids. Will also refer to dermatology for biopsy to make definitive diagnosis.   She is encouraged to stop smoking or cut back.  We will increase her statin to 80 mg daily given ongoing high ASCVD risk and numbers not at goal.  Increase losartan to 100 mg daily given elevated BP today  Labs ordered  Mammogram ordered  GYN referral for pap smear  She is comfortable with the plan and asked to follow up in 3-4 months; sooner if needed.     I saw and evaluated the patient. I personally obtained the key and critical portions of the history and physical exam or was physically present for key and critical portions performed by the resident/fellow. I reviewed the resident/fellow's documentation and discussed the patient with the resident/fellow. I agree with the resident/fellow's medical decision making as documented in the note.    Brenda S Logio, " MD

## 2024-05-15 NOTE — RESULT ENCOUNTER NOTE
Spoke to patient regarding Bx results, informed her scheduling will be contacting her for a follow up appmt to discuss treatment plan and further testing.

## 2024-05-20 ENCOUNTER — OFFICE VISIT (OUTPATIENT)
Dept: OPHTHALMOLOGY | Facility: CLINIC | Age: 69
End: 2024-05-20
Payer: COMMERCIAL

## 2024-05-20 DIAGNOSIS — H52.4 HYPEROPIA OF BOTH EYES WITH ASTIGMATISM AND PRESBYOPIA: Primary | ICD-10-CM

## 2024-05-20 DIAGNOSIS — H52.03 HYPEROPIA OF BOTH EYES WITH ASTIGMATISM AND PRESBYOPIA: Primary | ICD-10-CM

## 2024-05-20 DIAGNOSIS — H25.813 COMBINED FORM OF AGE-RELATED CATARACT, BOTH EYES: ICD-10-CM

## 2024-05-20 DIAGNOSIS — H52.203 HYPEROPIA OF BOTH EYES WITH ASTIGMATISM AND PRESBYOPIA: Primary | ICD-10-CM

## 2024-05-20 PROCEDURE — 92015 DETERMINE REFRACTIVE STATE: CPT | Performed by: OPTOMETRIST

## 2024-05-20 PROCEDURE — 92014 COMPRE OPH EXAM EST PT 1/>: CPT | Performed by: OPTOMETRIST

## 2024-05-20 ASSESSMENT — ENCOUNTER SYMPTOMS
RESPIRATORY NEGATIVE: 0
CARDIOVASCULAR NEGATIVE: 1
CONSTITUTIONAL NEGATIVE: 0
GASTROINTESTINAL NEGATIVE: 0
HEMATOLOGIC/LYMPHATIC NEGATIVE: 0
NEUROLOGICAL NEGATIVE: 0
EYES NEGATIVE: 0
PSYCHIATRIC NEGATIVE: 0
MUSCULOSKELETAL NEGATIVE: 0
ALLERGIC/IMMUNOLOGIC NEGATIVE: 1
ENDOCRINE NEGATIVE: 0

## 2024-05-20 ASSESSMENT — VISUAL ACUITY
OS_SC: 20/30
OD_SC: 20/40
METHOD: SNELLEN - LINEAR

## 2024-05-20 ASSESSMENT — CONF VISUAL FIELD
OD_NORMAL: 1
OS_NORMAL: 1
OS_SUPERIOR_NASAL_RESTRICTION: 0
OD_INFERIOR_NASAL_RESTRICTION: 0
OD_SUPERIOR_TEMPORAL_RESTRICTION: 0
OD_SUPERIOR_NASAL_RESTRICTION: 0
OS_INFERIOR_TEMPORAL_RESTRICTION: 0
OS_INFERIOR_NASAL_RESTRICTION: 0
OD_INFERIOR_TEMPORAL_RESTRICTION: 0
METHOD: COUNTING FINGERS
OS_SUPERIOR_TEMPORAL_RESTRICTION: 0

## 2024-05-20 ASSESSMENT — REFRACTION_WEARINGRX
OD_ADD: +2.50
OS_SPHERE: +1.00
OD_SPHERE: +1.00
OS_CYLINDER: -0.50
OD_AXIS: 125
OS_AXIS: 025
OS_ADD: +2.50
OD_CYLINDER: -0.50

## 2024-05-20 ASSESSMENT — REFRACTION_MANIFEST
OD_SPHERE: +0.75
OS_CYLINDER: -0.50
OS_SPHERE: +1.25
OS_ADD: +2.50
OD_CYLINDER: -0.50
OS_AXIS: 020
OD_ADD: +2.50
OD_AXIS: 135

## 2024-05-20 ASSESSMENT — EXTERNAL EXAM - LEFT EYE: OS_EXAM: NORMAL

## 2024-05-20 ASSESSMENT — SLIT LAMP EXAM - LIDS
COMMENTS: GOOD POSITION
COMMENTS: GOOD POSITION

## 2024-05-20 ASSESSMENT — CUP TO DISC RATIO
OD_RATIO: .3
OS_RATIO: .3

## 2024-05-20 ASSESSMENT — TONOMETRY
OS_IOP_MMHG: 15
IOP_METHOD: GOLDMANN APPLANATION
OD_IOP_MMHG: 16

## 2024-05-20 ASSESSMENT — EXTERNAL EXAM - RIGHT EYE: OD_EXAM: NORMAL

## 2024-05-20 NOTE — PROGRESS NOTES
A spectacle prescription was dispensed to be used as needed. Seeing occasional floater OS.     Past Optical coherence tomography of the macula revealed:    OD: Normal foveal contour, photoreceptor, retinal pigment epithelium, IS/OS junction, central field 253 micron.  Vitreous hyaloid base not visualized.  Findings are normal.   OS:  Normal foveal contour, photoreceptor, retinal pigment epithelium, IS/OS junction, central field 253 micron.  Vitreous hyaloid base not visualized.  Findings are normal.     Cataract are present OU and VA corrects to 20/20 OD and OS.     The patient was asked to return to our clinic in one year or sooner if ocular or vision changes occur

## 2024-05-29 ENCOUNTER — HOSPITAL ENCOUNTER (OUTPATIENT)
Dept: RADIOLOGY | Facility: HOSPITAL | Age: 69
Discharge: HOME | End: 2024-05-29
Payer: COMMERCIAL

## 2024-05-29 VITALS — WEIGHT: 163 LBS | HEIGHT: 64 IN | BODY MASS INDEX: 27.83 KG/M2

## 2024-05-29 DIAGNOSIS — R59.9 ENLARGED LYMPH NODES, UNSPECIFIED: ICD-10-CM

## 2024-05-29 DIAGNOSIS — Z00.00 HEALTHCARE MAINTENANCE: ICD-10-CM

## 2024-05-29 DIAGNOSIS — F17.210 NICOTINE DEPENDENCE, CIGARETTES, UNCOMPLICATED: ICD-10-CM

## 2024-05-29 PROCEDURE — 77067 SCR MAMMO BI INCL CAD: CPT | Performed by: STUDENT IN AN ORGANIZED HEALTH CARE EDUCATION/TRAINING PROGRAM

## 2024-05-29 PROCEDURE — 77067 SCR MAMMO BI INCL CAD: CPT

## 2024-05-29 PROCEDURE — 71271 CT THORAX LUNG CANCER SCR C-: CPT

## 2024-05-29 PROCEDURE — 77063 BREAST TOMOSYNTHESIS BI: CPT | Performed by: STUDENT IN AN ORGANIZED HEALTH CARE EDUCATION/TRAINING PROGRAM

## 2024-06-04 PROCEDURE — RXMED WILLOW AMBULATORY MEDICATION CHARGE

## 2024-06-05 ENCOUNTER — OFFICE VISIT (OUTPATIENT)
Dept: DERMATOLOGY | Facility: CLINIC | Age: 69
End: 2024-06-05
Payer: COMMERCIAL

## 2024-06-05 ENCOUNTER — PHARMACY VISIT (OUTPATIENT)
Dept: PHARMACY | Facility: CLINIC | Age: 69
End: 2024-06-05
Payer: COMMERCIAL

## 2024-06-05 ENCOUNTER — LAB (OUTPATIENT)
Dept: LAB | Facility: LAB | Age: 69
End: 2024-06-05
Payer: COMMERCIAL

## 2024-06-05 DIAGNOSIS — L12.0 BULLOUS PEMPHIGOID (MULTI): ICD-10-CM

## 2024-06-05 DIAGNOSIS — L12.0 BULLOUS PEMPHIGOID (MULTI): Primary | ICD-10-CM

## 2024-06-05 PROCEDURE — 99214 OFFICE O/P EST MOD 30 MIN: CPT | Performed by: STUDENT IN AN ORGANIZED HEALTH CARE EDUCATION/TRAINING PROGRAM

## 2024-06-05 PROCEDURE — 1159F MED LIST DOCD IN RCRD: CPT | Performed by: STUDENT IN AN ORGANIZED HEALTH CARE EDUCATION/TRAINING PROGRAM

## 2024-06-05 RX ORDER — CLOBETASOL PROPIONATE 0.5 MG/G
OINTMENT TOPICAL 2 TIMES DAILY
Qty: 60 G | Refills: 3 | Status: SHIPPED | OUTPATIENT
Start: 2024-06-05 | End: 2024-07-05

## 2024-06-05 NOTE — PROGRESS NOTES
Blanche Benson is a 68 y.o. female who presents for the following: Rash (One month s/p biopsy proven autoimmune blistering (Left Knee - Anterior, Left Lower Leg - Anterior)/Here to discuss biopsy results and treatment plan).     Review of Systems:  No other skin or systemic complaints other than what is documented elsewhere in the note.    The following portions of the chart were reviewed this encounter and updated as appropriate:          Skin Cancer History  No skin cancer on file.      Specialty Problems    None       Objective   Well appearing patient in no apparent distress; mood and affect are within normal limits.    A focused skin examination was performed. All findings within normal limits unless otherwise noted below.    Assessment/Plan   1. Bullous pemphigoid (Multi) (3)  Left Forearm - Anterior, Left Lower Leg - Anterior, Right Upper Arm - Anterior  Right upper arm, left wrist, and left lower extremity with hyperpigmented patches    Previous biopsy:   A. SKIN, LEFT LOWER LEG - ANTERIOR, PUNCH BIOPSY:  SUBEPIDERMAL BLISTER WITH NEUTROPHILS AND EOSINOPHILS, SEE NOTE.     Note: Microscopic examination reveals a specimen that extends into the subcutaneous fat. There is a subepidermal blister with adjacent spongiosis of the epidermis. There are occasional to moderate neutrophils and eosinophils in the blister space with a mild superficial lymphocytic infiltrate with occasional neutrophils and eosinophils.      These findings could be seen in an autoimmune subepidermal blistering disease such as bullous pemphigoid or epidermolysis bullosa acquisita. An arthropod assault is less likely.      B. SKIN, LEFT KNEE - ANTERIOR, PUNCH BIOPSY (DIF):  NEGATIVE DIRECT IMMUNOFLUORESCENCE TO ALL REACTANTS USED.  ______________________________________  LE DIF can be falsely negative  Will obtain serum BP antigen RUBIO test to confirm diagnosis  Start clobetasol 0.05% ointment for flares  Patient to apply to  affected areas 2x daily x 2 weeks then 1 week off, repeat as needed. Side effects of topical steroids were reviewed including risk of skin atrophy.  Reviewed medications for treatment often involve immunosuppression  For mild disease may get away with topical therapy  Consider dapsone  FU pending results        Related Procedures  Serum  and serum ; [facility]; 74715 - Miscellaneous Test    Related Medications  clobetasol (Temovate) 0.05 % ointment  Apply topically 2 times a day for 14 days.

## 2024-06-08 LAB — SCAN RESULT: NORMAL

## 2024-06-14 DIAGNOSIS — R21 RASH AND OTHER NONSPECIFIC SKIN ERUPTION: Primary | ICD-10-CM

## 2024-06-15 PROCEDURE — RXMED WILLOW AMBULATORY MEDICATION CHARGE

## 2024-06-16 ENCOUNTER — PHARMACY VISIT (OUTPATIENT)
Dept: PHARMACY | Facility: CLINIC | Age: 69
End: 2024-06-16
Payer: COMMERCIAL

## 2024-06-18 ENCOUNTER — LAB (OUTPATIENT)
Dept: LAB | Facility: LAB | Age: 69
End: 2024-06-18
Payer: COMMERCIAL

## 2024-06-18 DIAGNOSIS — R21 RASH AND OTHER NONSPECIFIC SKIN ERUPTION: ICD-10-CM

## 2024-06-18 PROCEDURE — 86235 NUCLEAR ANTIGEN ANTIBODY: CPT

## 2024-06-18 PROCEDURE — 36415 COLL VENOUS BLD VENIPUNCTURE: CPT

## 2024-06-18 PROCEDURE — 86225 DNA ANTIBODY NATIVE: CPT

## 2024-06-18 PROCEDURE — 86038 ANTINUCLEAR ANTIBODIES: CPT

## 2024-06-19 ENCOUNTER — APPOINTMENT (OUTPATIENT)
Dept: OBSTETRICS AND GYNECOLOGY | Facility: HOSPITAL | Age: 69
End: 2024-06-19
Payer: COMMERCIAL

## 2024-06-20 LAB
ANA PATTERN: ABNORMAL
ANA SER QL HEP2 SUBST: POSITIVE
ANA TITR SER IF: ABNORMAL {TITER}
CENTROMERE B AB SER-ACNC: <0.2 AI
CHROMATIN AB SERPL-ACNC: <0.2 AI
DSDNA AB SER-ACNC: <1 IU/ML
ENA JO1 AB SER QL IA: <0.2 AI
ENA RNP AB SER IA-ACNC: <0.2 AI
ENA SCL70 AB SER QL IA: <0.2 AI
ENA SM AB SER IA-ACNC: <0.2 AI
ENA SM+RNP AB SER QL IA: <0.2 AI
ENA SS-A AB SER IA-ACNC: <0.2 AI
ENA SS-B AB SER IA-ACNC: <0.2 AI
RIBOSOMAL P AB SER-ACNC: <0.2 AI

## 2024-07-06 ENCOUNTER — PHARMACY VISIT (OUTPATIENT)
Dept: PHARMACY | Facility: CLINIC | Age: 69
End: 2024-07-06
Payer: COMMERCIAL

## 2024-07-06 PROCEDURE — RXMED WILLOW AMBULATORY MEDICATION CHARGE

## 2024-07-11 ENCOUNTER — PHARMACY VISIT (OUTPATIENT)
Dept: PHARMACY | Facility: CLINIC | Age: 69
End: 2024-07-11
Payer: COMMERCIAL

## 2024-07-11 ENCOUNTER — LAB REQUISITION (OUTPATIENT)
Dept: DERMATOPATHOLOGY | Facility: CLINIC | Age: 69
End: 2024-07-11
Payer: COMMERCIAL

## 2024-07-11 ENCOUNTER — LAB (OUTPATIENT)
Dept: LAB | Facility: LAB | Age: 69
End: 2024-07-11
Payer: COMMERCIAL

## 2024-07-11 ENCOUNTER — APPOINTMENT (OUTPATIENT)
Dept: DERMATOLOGY | Facility: CLINIC | Age: 69
End: 2024-07-11
Payer: COMMERCIAL

## 2024-07-11 DIAGNOSIS — R21 RASH AND OTHER NONSPECIFIC SKIN ERUPTION: ICD-10-CM

## 2024-07-11 DIAGNOSIS — R21 RASH AND OTHER NONSPECIFIC SKIN ERUPTION: Primary | ICD-10-CM

## 2024-07-11 PROCEDURE — 99214 OFFICE O/P EST MOD 30 MIN: CPT | Performed by: STUDENT IN AN ORGANIZED HEALTH CARE EDUCATION/TRAINING PROGRAM

## 2024-07-11 PROCEDURE — 1159F MED LIST DOCD IN RCRD: CPT | Performed by: STUDENT IN AN ORGANIZED HEALTH CARE EDUCATION/TRAINING PROGRAM

## 2024-07-11 PROCEDURE — 11105 PUNCH BX SKIN EA SEP/ADDL: CPT | Performed by: STUDENT IN AN ORGANIZED HEALTH CARE EDUCATION/TRAINING PROGRAM

## 2024-07-11 PROCEDURE — RXMED WILLOW AMBULATORY MEDICATION CHARGE

## 2024-07-11 PROCEDURE — 11104 PUNCH BX SKIN SINGLE LESION: CPT | Performed by: STUDENT IN AN ORGANIZED HEALTH CARE EDUCATION/TRAINING PROGRAM

## 2024-07-11 RX ORDER — PREDNISONE 10 MG/1
TABLET ORAL
Qty: 70 TABLET | Refills: 0 | Status: SHIPPED | OUTPATIENT
Start: 2024-07-11 | End: 2024-08-08

## 2024-07-11 NOTE — PROGRESS NOTES
Subjective     Dorinda Benson is a 68 y.o. female who presents for the following: Rash (Rash to Left arm, left leg, mid back. Patient reports at times area starts as a blister, itches, then aches. Rx'd clobetasol cream, reports does not help with pain or itch. Punch Bx was done to r/o BP, DIF was negative. ).     Review of Systems:  No other skin or systemic complaints other than what is documented elsewhere in the note.    The following portions of the chart were reviewed this encounter and updated as appropriate:          Skin Cancer History  No skin cancer on file.      Specialty Problems    None       Objective   Well appearing patient in no apparent distress; mood and affect are within normal limits.    A focused skin examination was performed. All findings within normal limits unless otherwise noted below.    Assessment/Plan   1. Rash and other nonspecific skin eruption  Left Shoulder - Posterior, Left Upper Arm - Posterior  Urticarial plaques on back and left upper arm and left thigh    No blisters since last visit  First biopsy indicative of AIBD such as BP or EBA  DIF negative  Deanna negative for anti  and anti   Clobetasol not helping  Get new very itchy and sometimes painful urticarial lesions, predominantly on left side and back  Will repeat biopsy x2 for H&E and DIF (last from lower extremity which can be falsely negative)  Will obtain blood sample for IIF. If DIF and IIF negative, low likelihood for autoimmune blistering disease  Consider arthropod given these lesions start since moving to [place] from [place]  Patient did stop atorvastatin as this rash corresponded with starting this med, advised her to inform her PCP of this  Start prednisone taper 40->30->20->10 mg weekly taper  Continue topical clobetasol          Lesion biopsy - Left Shoulder - Posterior  Type of biopsy: punch    Informed consent: discussed and consent obtained    Timeout: patient name, date of birth, surgical site, and  procedure verified    Procedure prep:  Patient was prepped and draped  Anesthesia: the lesion was anesthetized in a standard fashion    Anesthetic:  1% lidocaine w/ epinephrine 1-100,000 local infiltration  Punch size:  4 mm  Suture size:  5-0  Suture type: fast-absorbing plain gut    Hemostasis achieved with: suture    Outcome: patient tolerated procedure well    Post-procedure details: sterile dressing applied and wound care instructions given    Dressing type: bandage and petrolatum      Skin biopsy - Left Upper Arm - Posterior  Type of biopsy: punch    Informed consent: discussed and consent obtained    Timeout: patient name, date of birth, surgical site, and procedure verified    Procedure prep:  Patient was prepped and draped  Anesthesia: the lesion was anesthetized in a standard fashion    Anesthetic:  1% lidocaine w/ epinephrine 1-100,000 local infiltration  Punch size:  4 mm  Suture size:  5-0  Suture type: fast-absorbing plain gut    Hemostasis achieved with: suture    Outcome: patient tolerated procedure well    Post-procedure details: sterile dressing applied and wound care instructions given    Dressing type: bandage and petrolatum      Specimen 1 - Dermatopathology- DERM LAB  Differential Diagnosis: r/o bp, eba  Check Margins Yes/No?:    Comments:    Dermpath Lab: Direct Immunofluorescence (specimen in Omar's media)    Specimen 2 - Dermatopathology- DERM LAB  Differential Diagnosis: recurrent blisters and urticarial plaques. DIF and RUBIO previously negative. AIBD vs arthropod vs other  Check Margins Yes/No?:    Comments:    Dermpath Lab: Routine Histopathology (formalin-fixed tissue)    Related Procedures  Indirect Immunofluorescence; Other-indicate in comment ( Derm Path); N/a - Miscellaneous Test    Related Medications  clobetasol (Temovate) 0.05 % ointment  Apply topically 2 times a day for 14 days.    predniSONE (Deltasone) 10 mg tablet  Take 4 tablets (40 mg) by mouth once daily in the  morning for 7 days, THEN 3 tablets (30 mg) once daily in the morning for 7 days, THEN 2 tablets (20 mg) once daily in the morning for 7 days, THEN 1 tablet (10 mg) once daily in the morning for 7 days.

## 2024-07-12 LAB
LAB AP ASR DISCLAIMER: NORMAL
LABORATORY COMMENT REPORT: NORMAL
PATH REPORT.FINAL DX SPEC: NORMAL
PATH REPORT.GROSS SPEC: NORMAL
PATH REPORT.RELEVANT HX SPEC: NORMAL
PATH REPORT.TOTAL CANCER: NORMAL

## 2024-07-15 LAB
LAB AP ASR DISCLAIMER: NORMAL
LABORATORY COMMENT REPORT: NORMAL
PATH REPORT.FINAL DX SPEC: NORMAL
PATH REPORT.GROSS SPEC: NORMAL
PATH REPORT.MICROSCOPIC SPEC OTHER STN: NORMAL
PATH REPORT.RELEVANT HX SPEC: NORMAL
PATH REPORT.TOTAL CANCER: NORMAL

## 2024-07-16 ENCOUNTER — TELEPHONE (OUTPATIENT)
Dept: INTERNAL MEDICINE | Facility: HOSPITAL | Age: 69
End: 2024-07-16
Payer: COMMERCIAL

## 2024-07-16 NOTE — TELEPHONE ENCOUNTER
Called to discuss the results of patients Skin Biopsy which were consistent with either bullous pemphigoid or epidermolysis bullosa acquisita. She has been taking prednisone taper currently after meeting with Dermatology and stated that her skin changes have been improving. I also informed her of the results of her Low Dose CT Lung screening as well as Mammogram and instructed her to get the follow up screening in 1 year. She stated that she has no active issues currently and will make an office visit if she has another flare. All questions were answered to patients satisfaction.

## 2024-07-22 NOTE — RESULT ENCOUNTER NOTE
Spoke with patient and informed her of recent punch biopsy and lab results. Dr. Veliz noted that the blood and skin tests were most consistent with bug bites. He would like patient to come back in for reassessment in two months.

## 2024-08-07 PROCEDURE — RXMED WILLOW AMBULATORY MEDICATION CHARGE

## 2024-08-09 ENCOUNTER — PHARMACY VISIT (OUTPATIENT)
Dept: PHARMACY | Facility: CLINIC | Age: 69
End: 2024-08-09
Payer: COMMERCIAL

## 2024-08-14 PROCEDURE — RXMED WILLOW AMBULATORY MEDICATION CHARGE

## 2024-08-15 ENCOUNTER — PHARMACY VISIT (OUTPATIENT)
Dept: PHARMACY | Facility: CLINIC | Age: 69
End: 2024-08-15
Payer: COMMERCIAL

## 2024-09-17 ENCOUNTER — PHARMACY VISIT (OUTPATIENT)
Dept: PHARMACY | Facility: CLINIC | Age: 69
End: 2024-09-17
Payer: COMMERCIAL

## 2024-09-17 PROCEDURE — RXMED WILLOW AMBULATORY MEDICATION CHARGE

## 2024-09-18 PROCEDURE — RXMED WILLOW AMBULATORY MEDICATION CHARGE

## 2024-09-20 ENCOUNTER — PHARMACY VISIT (OUTPATIENT)
Dept: PHARMACY | Facility: CLINIC | Age: 69
End: 2024-09-20
Payer: COMMERCIAL

## 2024-10-02 ENCOUNTER — APPOINTMENT (OUTPATIENT)
Dept: DERMATOLOGY | Facility: CLINIC | Age: 69
End: 2024-10-02
Payer: COMMERCIAL

## 2024-10-19 PROCEDURE — RXMED WILLOW AMBULATORY MEDICATION CHARGE

## 2024-10-23 ENCOUNTER — PHARMACY VISIT (OUTPATIENT)
Dept: PHARMACY | Facility: CLINIC | Age: 69
End: 2024-10-23
Payer: COMMERCIAL

## 2024-11-06 ENCOUNTER — OFFICE VISIT (OUTPATIENT)
Dept: PRIMARY CARE | Facility: HOSPITAL | Age: 69
End: 2024-11-06
Payer: COMMERCIAL

## 2024-11-06 VITALS
HEIGHT: 64 IN | HEART RATE: 67 BPM | DIASTOLIC BLOOD PRESSURE: 83 MMHG | WEIGHT: 165 LBS | SYSTOLIC BLOOD PRESSURE: 142 MMHG | TEMPERATURE: 97.4 F | OXYGEN SATURATION: 100 % | BODY MASS INDEX: 28.17 KG/M2

## 2024-11-06 DIAGNOSIS — R06.02 SHORTNESS OF BREATH: ICD-10-CM

## 2024-11-06 DIAGNOSIS — N30.00 ACUTE CYSTITIS WITHOUT HEMATURIA: Primary | ICD-10-CM

## 2024-11-06 LAB
APPEARANCE UR: CLEAR
BILIRUB UR STRIP.AUTO-MCNC: NEGATIVE MG/DL
COLOR UR: NORMAL
GLUCOSE UR STRIP.AUTO-MCNC: NORMAL MG/DL
KETONES UR STRIP.AUTO-MCNC: NEGATIVE MG/DL
LEUKOCYTE ESTERASE UR QL STRIP.AUTO: NEGATIVE
NITRITE UR QL STRIP.AUTO: NEGATIVE
PH UR STRIP.AUTO: 5.5 [PH]
POC APPEARANCE, URINE: CLEAR
POC BILIRUBIN, URINE: NEGATIVE
POC BLOOD, URINE: NEGATIVE
POC COLOR, URINE: YELLOW
POC GLUCOSE, URINE: NEGATIVE MG/DL
POC KETONES, URINE: NEGATIVE MG/DL
POC LEUKOCYTES, URINE: NEGATIVE
POC NITRITE,URINE: NEGATIVE
POC PH, URINE: 5.5 PH
POC PROTEIN, URINE: NEGATIVE MG/DL
POC SPECIFIC GRAVITY, URINE: 1.02
POC UROBILINOGEN, URINE: 0.2 EU/DL
PROT UR STRIP.AUTO-MCNC: NEGATIVE MG/DL
RBC # UR STRIP.AUTO: NEGATIVE /UL
SP GR UR STRIP.AUTO: 1.01
UROBILINOGEN UR STRIP.AUTO-MCNC: NORMAL MG/DL

## 2024-11-06 PROCEDURE — 81003 URINALYSIS AUTO W/O SCOPE: CPT

## 2024-11-06 PROCEDURE — RXMED WILLOW AMBULATORY MEDICATION CHARGE

## 2024-11-06 PROCEDURE — 81002 URINALYSIS NONAUTO W/O SCOPE: CPT

## 2024-11-06 RX ORDER — GRANULES FOR ORAL 3 G/1
3 POWDER ORAL ONCE
Qty: 3 G | Refills: 0 | Status: SHIPPED | OUTPATIENT
Start: 2024-11-06 | End: 2024-11-08

## 2024-11-06 RX ORDER — ALBUTEROL SULFATE 90 UG/1
INHALANT RESPIRATORY (INHALATION)
Qty: 110.5 G | Refills: 1 | Status: SHIPPED | OUTPATIENT
Start: 2024-11-06 | End: 2025-04-05

## 2024-11-06 ASSESSMENT — ENCOUNTER SYMPTOMS
VOMITING: 0
FEVER: 0
NAUSEA: 0
DEPRESSION: 1
OCCASIONAL FEELINGS OF UNSTEADINESS: 0
FREQUENCY: 1
SHORTNESS OF BREATH: 1
DYSURIA: 1
FLANK PAIN: 0
CHILLS: 0
DIARRHEA: 0
LOSS OF SENSATION IN FEET: 0

## 2024-11-06 ASSESSMENT — PATIENT HEALTH QUESTIONNAIRE - PHQ9
1. LITTLE INTEREST OR PLEASURE IN DOING THINGS: SEVERAL DAYS
SUM OF ALL RESPONSES TO PHQ9 QUESTIONS 1 AND 2: 2
2. FEELING DOWN, DEPRESSED OR HOPELESS: SEVERAL DAYS
10. IF YOU CHECKED OFF ANY PROBLEMS, HOW DIFFICULT HAVE THESE PROBLEMS MADE IT FOR YOU TO DO YOUR WORK, TAKE CARE OF THINGS AT HOME, OR GET ALONG WITH OTHER PEOPLE: NOT DIFFICULT AT ALL

## 2024-11-06 ASSESSMENT — PAIN SCALES - GENERAL: PAINLEVEL_OUTOF10: 8

## 2024-11-06 NOTE — PATIENT INSTRUCTIONS
As discussed today, our plan is:     You probably have a urinary tract infection. We will send a urine sample for urinanalysis and culture. We will start you on an antibiotic (Fosfomycin).  You will be prescribed albuterol to be taken as needed for shortness of breath  4.   If you smoke or use other tobacco products, take steps to quit. Call [phone] for more information or to set up an appointment with  [facility]. The [place] Tobacco Quit Line is a free resource for people who don’t have insurance, receive Medicaid, pregnant women, or members of the [place] [facility]. Call 4-602-QUIT-NOW or 1-344.149.5588.    Please come back to see us in: 6 months   ------  If you have any problems or questions, please contact the clinic at [phone] to leave a message. Our fax number is [phone]. If your issue cannot wait until the next business day, please go to urgent care or the emergency department.     I also strongly urge all of my patients to register for [email or web address] by going to: [email or web address]  (The  staff can also send you a text/email link to register when you check out).    No shows: It is understandable if you are unable to make it to a visit, but please cancel your appointment instead of not showing up. This helps to give other patients access to primary care and keeps wait times low.        [facility]   [phone]

## 2024-11-06 NOTE — PROGRESS NOTES
[facility]      Chief complaint: urinary symptoms    HPI:  Dorinda Benson is a 69 y.o. female patient with PMHx of HTN, prediabetes (last A1c 5.9 on August 2024), DL, presenting for urinary symptoms.    Reports new onset of urinary symptoms that started few days ago, with urinary burning, increased urinary frequency during the day and the night, and change in color and smell or urine.    No hematuria.  Reports pelvic pain. No other abdominal pain. No back or flank pain.  No nausea or vomiting. Presence of loose stools but no diarrhea.  No unusual vaginal discharge. Currently not sexually active.  No fever or chills.  Drinks around 2L of water per day  Had some UTIs in the past, last was several years ago, but no recurrent UTIs several times per year.    Review of systems:  Review of Systems   Constitutional:  Negative for chills and fever.   Respiratory:  Positive for shortness of breath.         Reports occasional shortness of breath   Gastrointestinal:  Negative for diarrhea, nausea and vomiting.   Genitourinary:  Positive for dysuria, frequency, pelvic pain and urgency. Negative for flank pain.   Skin:  Positive for rash.        Past medical history:  No past medical history on file.    Surgical history:  No past surgical history on file.    Family history:  No family history on file.    Medications:  Current Outpatient Medications   Medication Instructions    albuterol 90 mcg/actuation inhaler INHALE 2 PUFFS BY MOUTH AND INTO THE LUNGS EVERY 4-6 HOURS AS NEEDED    amLODIPine (Norvasc) 5 mg tablet TAKE 1 TABLET BY MOUTH ONCE DAILY    atorvastatin (Lipitor) 40 mg tablet TAKE 1 TABLET BY MOUTH AT BEDTIME    cetirizine (ZyrTEC) 10 mg tablet Take 1 tablet every day by oral route in the morning.    cholecalciferol (Vitamin D-3) 50,000 unit capsule TAKE 1 CAPSULE BY MOUTH ONCE WEEKLY    fluocinonide (Lidex) 0.05 % external solution Topical, 2 times daily PRN    fluticasone (Flonase) 50  mcg/actuation nasal spray 2 sprays, Each Nostril, Daily    losartan (COZAAR) 100 mg, oral, Daily     Current medications:  -Amlodipine 5 mg daily  -Losartan    Refill needed: needs refill for albuterol    Allergies:  Allergies   Allergen Reactions    Aspirin Other    Latex Hives and Itching       Health maintenance:  Health Maintenance   Topic Date Due    Medicare Annual Wellness Visit (AWV)  Never done    Hepatitis A Vaccines (1 of 2 - Risk 2-dose series) Never done    RSV High Risk: (Elderly (60+) or Pregnant Population) (1 - Risk 60-74 years 1-dose series) Never done    Influenza Vaccine (1) 09/01/2024    COVID-19 Vaccine (1 - 2024-25 season) Never done    Bone Density Scan  04/27/2025    Diabetes: Hemoglobin A1C  05/08/2025    Mammogram  05/29/2025    Diabetes Screening  05/08/2027    Lipid Panel  05/08/2029    DTaP/Tdap/Td Vaccines (2 - Td or Tdap) 01/24/2033    Colorectal Cancer Screening  02/22/2033    Pneumococcal Vaccine: 65+ Years  Completed    Zoster Vaccines  Completed    Hepatitis C Screening  Completed    HIB Vaccines  Aged Out    Hepatitis B Vaccines  Aged Out    IPV Vaccines  Aged Out    Meningococcal Vaccine  Aged Out    Rotavirus Vaccines  Aged Out    HPV Vaccines  Aged Out    Irritable Bowel Syndrome  Discontinued         Social history:   reports that she has been smoking cigarettes. She has never used smokeless tobacco. She reports current alcohol use. She reports current drug use. Drug: Marijuana.  Work: currently works in grocery store  Living situation: moved from [place] to [place] 3 years ago, lives with her sister currently, her mother passed away in August.  Tobacco: heavy smoker, cigarettes  Alcohol: 1 beer per day  Other drugs: mariguana    Sexual History  Sexual activity: No  Contraception: No  STI Concern/Hx: No concerns at this time          Vitals:  Vitals:    11/06/24 1405   BP: 142/83   Pulse: 67   Temp: 36.3 °C (97.4 °F)   SpO2: 100%       Physical exam:  Physical  "Exam  Constitutional:       Appearance: Normal appearance.   Cardiovascular:      Rate and Rhythm: Normal rate and regular rhythm.   Pulmonary:      Effort: Pulmonary effort is normal.      Breath sounds: Normal breath sounds.   Abdominal:      General: Abdomen is flat.      Palpations: Abdomen is soft.      Tenderness: There is no right CVA tenderness or left CVA tenderness.      Comments: Pelvic tenderness   Musculoskeletal:      Right lower leg: No edema.      Left lower leg: No edema.   Neurological:      Mental Status: She is alert.         Labs:  Lab Results   Component Value Date    WBC 4.3 (L) 05/08/2024    HGB 12.3 05/08/2024    HCT 38.1 05/08/2024    MCV 99 05/08/2024     05/08/2024       Lab Results   Component Value Date    GLUCOSE 87 05/08/2024    CALCIUM 9.9 05/08/2024     05/08/2024    K 5.3 05/08/2024    CO2 26 05/08/2024     05/08/2024    BUN 11 05/08/2024    CREATININE 0.84 05/08/2024       Lab Results   Component Value Date    HGBA1C 5.9 (H) 05/08/2024        Lab Results   Component Value Date    CHOL 226 (H) 05/08/2024    CHOL 241 (H) 11/22/2023    CHOL 241 (H) 11/22/2023     Lab Results   Component Value Date    HDL 65.4 05/08/2024    HDL 59.3 11/22/2023    HDL 60.2 11/22/2023     Lab Results   Component Value Date    LDLCALC 146 (H) 11/22/2023     Lab Results   Component Value Date    TRIG 174 (H) 11/22/2023     No components found for: \"CHOLHDL\"    Imaging:  No results found.    Assessment and plan:  Dorinda Benson is a 69 y.o. female patient with PMHx of HTN, pre-DM, HL, presenting for urinary symptoms suggestive of uncomplicated UTI.    #UTI  -Send UA with urine culture  -Start Fosfomycin 3g once  -Patient instructed to call back if she does not improve    #Shortness of breath  -Patient counseled about the importance of quitting smoking. She acknowledges that she needs to quit eventually, however she is currently going through grief after the recent passing of her " mother.  -Low dose CT done in August 2024: stable 3mm pulmonary nodules in the left lung, likely benign  -Never diagnosed with COPD, however has mild occasional shortness of breath, used to take albuterol inhaler PRN, refilled.    #Skin rash  -Patient was followed up by dermatology for a skin rash that started in August 2024, consisting of dark plaques and blisters over the limbs especially over the left arm. Were itchy initially. Underwent punch biopsy, suggestive of autoimmune blistering disease  -Tried clobetasol but did not help.  -Stopped atorvastatin, reported some improvement  -Received prednisone taper, but did not report improvement. Currently off prednisone.      HEALTH MAINTENANCE     Vaccines:  Influenza: refused by the patient  COVID: never done  Tdap: received on 5/29/2024  Zoster vaccine (adult >49 yo, 2 doses 2-6mo apart): N/A   Pneumococcal vaccine (adult <65 w/ risk factors eg, smoking, diabetes, heart/liver/lung disease; or adult >65: PCV 21, PCV 20, or PCV15 followed by PPSV23): N/A     Cancer screening:  Mammogram (ACOG rec age 40, USPSTF age 50: shared decision making 40-50, then q1-2 yrs until age 74, and then shared decision making): last mammogram 5/29/2024, negative for malignancy  Pap Smear (21-29 every 3 yrs; 30-65  every 5 yrs w/HPV or every 3 years if no HPV): N/A  Colonoscopy (age 45-74 yo): last done in 2/22/2023, 2-4 polyps resected, recommended repeat colonoscopy in 3 - 5 years for surveillance   Low dose chest CT (age 50-80, 20 yrs smoking hx, current or quit within last 15yrs): Low dose CT done in August 2024: stable 3mm pulmonary nodules in the left lung, likely benign    Other screening tests:  AAA screening: N/A  DEXA scan (females >65 or <65 w/ hx of hip fracture): last done 4/27/2023  HbA1c: 5.9 (5/9/2024)  ASCVD score: 25.9    Antibody screening:  HIV: negative   Syphilis: negative   Hepatitis B: negative  Hepatitis C: negative     Medicare Wellness Visit: -      Follow-up  in 6 months    Patient and plan discussed with attending physician Dr. Bishop.    Nayeli Garcia MD

## 2024-11-07 ENCOUNTER — PHARMACY VISIT (OUTPATIENT)
Dept: PHARMACY | Facility: CLINIC | Age: 69
End: 2024-11-07
Payer: COMMERCIAL

## 2024-11-07 LAB — HOLD SPECIMEN: NORMAL

## 2024-11-24 PROCEDURE — RXMED WILLOW AMBULATORY MEDICATION CHARGE

## 2024-11-25 ENCOUNTER — PHARMACY VISIT (OUTPATIENT)
Dept: PHARMACY | Facility: CLINIC | Age: 69
End: 2024-11-25
Payer: COMMERCIAL

## 2024-12-06 ENCOUNTER — APPOINTMENT (OUTPATIENT)
Dept: RADIOLOGY | Facility: HOSPITAL | Age: 69
DRG: 322 | End: 2024-12-06
Payer: MEDICARE

## 2024-12-06 ENCOUNTER — CLINICAL SUPPORT (OUTPATIENT)
Dept: EMERGENCY MEDICINE | Facility: HOSPITAL | Age: 69
DRG: 322 | End: 2024-12-06
Payer: MEDICARE

## 2024-12-06 ENCOUNTER — HOSPITAL ENCOUNTER (INPATIENT)
Facility: HOSPITAL | Age: 69
LOS: 2 days | Discharge: HOME | End: 2024-12-08
Attending: EMERGENCY MEDICINE | Admitting: INTERNAL MEDICINE
Payer: MEDICARE

## 2024-12-06 ENCOUNTER — APPOINTMENT (OUTPATIENT)
Dept: CARDIOLOGY | Facility: HOSPITAL | Age: 69
DRG: 322 | End: 2024-12-06
Payer: MEDICARE

## 2024-12-06 DIAGNOSIS — R07.2 PRECORDIAL PAIN: ICD-10-CM

## 2024-12-06 DIAGNOSIS — I21.4 NSTEMI (NON-ST ELEVATED MYOCARDIAL INFARCTION) (MULTI): Primary | ICD-10-CM

## 2024-12-06 DIAGNOSIS — I21.4 NON-ST ELEVATION (NSTEMI) MYOCARDIAL INFARCTION (MULTI): ICD-10-CM

## 2024-12-06 DIAGNOSIS — R07.1 CHEST PAIN ON BREATHING: ICD-10-CM

## 2024-12-06 DIAGNOSIS — F10.920 ALCOHOLIC INTOXICATION WITHOUT COMPLICATION (CMS-HCC): ICD-10-CM

## 2024-12-06 PROBLEM — E55.9 VITAMIN D DEFICIENCY: Status: ACTIVE | Noted: 2024-12-06

## 2024-12-06 PROBLEM — H52.03 HYPEROPIA OF BOTH EYES WITH ASTIGMATISM AND PRESBYOPIA: Status: RESOLVED | Noted: 2024-05-20 | Resolved: 2024-12-06

## 2024-12-06 PROBLEM — H52.203 HYPEROPIA OF BOTH EYES WITH ASTIGMATISM AND PRESBYOPIA: Status: RESOLVED | Noted: 2024-05-20 | Resolved: 2024-12-06

## 2024-12-06 PROBLEM — Z20.822 SUSPECTED SEVERE ACUTE RESPIRATORY SYNDROME CORONAVIRUS 2 (SARS-COV-2) INFECTION: Status: RESOLVED | Noted: 2021-08-12 | Resolved: 2024-12-06

## 2024-12-06 PROBLEM — R07.9 CHEST PAIN: Status: ACTIVE | Noted: 2024-12-06

## 2024-12-06 PROBLEM — J44.9 CHRONIC OBSTRUCTIVE PULMONARY DISEASE, UNSPECIFIED: Status: ACTIVE | Noted: 2024-12-06

## 2024-12-06 PROBLEM — R80.9 PROTEINURIA: Status: RESOLVED | Noted: 2023-12-01 | Resolved: 2024-12-06

## 2024-12-06 PROBLEM — H52.4 HYPEROPIA OF BOTH EYES WITH ASTIGMATISM AND PRESBYOPIA: Status: RESOLVED | Noted: 2024-05-20 | Resolved: 2024-12-06

## 2024-12-06 PROBLEM — E78.5 HYPERLIPIDEMIA: Status: ACTIVE | Noted: 2024-12-06

## 2024-12-06 LAB
ALBUMIN SERPL BCP-MCNC: 4.2 G/DL (ref 3.4–5)
ALBUMIN SERPL BCP-MCNC: 4.5 G/DL (ref 3.4–5)
ALP SERPL-CCNC: 74 U/L (ref 33–136)
ALP SERPL-CCNC: 87 U/L (ref 33–136)
ALT SERPL W P-5'-P-CCNC: 16 U/L (ref 7–45)
ALT SERPL W P-5'-P-CCNC: 17 U/L (ref 7–45)
AMPHETAMINES UR QL SCN: ABNORMAL
ANION GAP SERPL CALC-SCNC: 16 MMOL/L (ref 10–20)
ANION GAP SERPL CALC-SCNC: 16 MMOL/L (ref 10–20)
AORTIC VALVE PEAK VELOCITY: 1.77 M/S
APTT PPP: 29 SECONDS (ref 27–38)
AST SERPL W P-5'-P-CCNC: 17 U/L (ref 9–39)
AST SERPL W P-5'-P-CCNC: 27 U/L (ref 9–39)
ATRIAL RATE: 59 BPM
ATRIAL RATE: 64 BPM
AV PEAK GRADIENT: 13 MMHG
AVA (PEAK VEL): 2.75 CM2
BARBITURATES UR QL SCN: ABNORMAL
BASOPHILS # BLD AUTO: 0.02 X10*3/UL (ref 0–0.1)
BASOPHILS # BLD AUTO: 0.06 X10*3/UL (ref 0–0.1)
BASOPHILS NFR BLD AUTO: 0.3 %
BASOPHILS NFR BLD AUTO: 0.9 %
BENZODIAZ UR QL SCN: ABNORMAL
BILIRUB SERPL-MCNC: 0.3 MG/DL (ref 0–1.2)
BILIRUB SERPL-MCNC: 0.5 MG/DL (ref 0–1.2)
BUN SERPL-MCNC: 13 MG/DL (ref 6–23)
BUN SERPL-MCNC: 9 MG/DL (ref 6–23)
BZE UR QL SCN: ABNORMAL
CALCIUM SERPL-MCNC: 10.1 MG/DL (ref 8.6–10.6)
CALCIUM SERPL-MCNC: 9.3 MG/DL (ref 8.6–10.6)
CANNABINOIDS UR QL SCN: ABNORMAL
CARDIAC TROPONIN I PNL SERPL HS: 120 NG/L (ref 0–34)
CARDIAC TROPONIN I PNL SERPL HS: 1226 NG/L (ref 0–34)
CARDIAC TROPONIN I PNL SERPL HS: 134 NG/L (ref 0–34)
CARDIAC TROPONIN I PNL SERPL HS: 296 NG/L (ref 0–34)
CHLORIDE SERPL-SCNC: 101 MMOL/L (ref 98–107)
CHLORIDE SERPL-SCNC: 103 MMOL/L (ref 98–107)
CHOLEST SERPL-MCNC: 272 MG/DL (ref 0–199)
CHOLESTEROL/HDL RATIO: 3.7
CO2 SERPL-SCNC: 24 MMOL/L (ref 21–32)
CO2 SERPL-SCNC: 27 MMOL/L (ref 21–32)
CREAT SERPL-MCNC: 0.62 MG/DL (ref 0.5–1.05)
CREAT SERPL-MCNC: 0.7 MG/DL (ref 0.5–1.05)
EGFRCR SERPLBLD CKD-EPI 2021: >90 ML/MIN/1.73M*2
EGFRCR SERPLBLD CKD-EPI 2021: >90 ML/MIN/1.73M*2
EJECTION FRACTION APICAL 4 CHAMBER: 71.9
EJECTION FRACTION: 73 %
EOSINOPHIL # BLD AUTO: 0.01 X10*3/UL (ref 0–0.7)
EOSINOPHIL # BLD AUTO: 0.12 X10*3/UL (ref 0–0.7)
EOSINOPHIL NFR BLD AUTO: 0.2 %
EOSINOPHIL NFR BLD AUTO: 1.8 %
ERYTHROCYTE [DISTWIDTH] IN BLOOD BY AUTOMATED COUNT: 12.6 % (ref 11.5–14.5)
ERYTHROCYTE [DISTWIDTH] IN BLOOD BY AUTOMATED COUNT: 12.7 % (ref 11.5–14.5)
EST. AVERAGE GLUCOSE BLD GHB EST-MCNC: 120 MG/DL
ETHANOL SERPL-MCNC: 51 MG/DL
FENTANYL+NORFENTANYL UR QL SCN: ABNORMAL
GLUCOSE BLD MANUAL STRIP-MCNC: 92 MG/DL (ref 74–99)
GLUCOSE SERPL-MCNC: 106 MG/DL (ref 74–99)
GLUCOSE SERPL-MCNC: 111 MG/DL (ref 74–99)
HBA1C MFR BLD: 5.8 %
HCT VFR BLD AUTO: 32.2 % (ref 36–46)
HCT VFR BLD AUTO: 37.2 % (ref 36–46)
HDLC SERPL-MCNC: 73.5 MG/DL
HGB BLD-MCNC: 10.8 G/DL (ref 12–16)
HGB BLD-MCNC: 12.7 G/DL (ref 12–16)
HOLD SPECIMEN: NORMAL
HOLD SPECIMEN: NORMAL
IMM GRANULOCYTES # BLD AUTO: 0.01 X10*3/UL (ref 0–0.7)
IMM GRANULOCYTES # BLD AUTO: 0.03 X10*3/UL (ref 0–0.7)
IMM GRANULOCYTES NFR BLD AUTO: 0.1 % (ref 0–0.9)
IMM GRANULOCYTES NFR BLD AUTO: 0.5 % (ref 0–0.9)
INR PPP: 1.1 (ref 0.9–1.1)
LDLC SERPL CALC-MCNC: 161 MG/DL
LEFT ATRIUM VOLUME AREA LENGTH INDEX BSA: 28.3 ML/M2
LEFT VENTRICLE INTERNAL DIMENSION DIASTOLE: 4.4 CM (ref 3.5–6)
LEFT VENTRICULAR OUTFLOW TRACT DIAMETER: 2 CM
LYMPHOCYTES # BLD AUTO: 2.34 X10*3/UL (ref 1.2–4.8)
LYMPHOCYTES # BLD AUTO: 3.26 X10*3/UL (ref 1.2–4.8)
LYMPHOCYTES NFR BLD AUTO: 36.8 %
LYMPHOCYTES NFR BLD AUTO: 48.7 %
MAGNESIUM SERPL-MCNC: 1.96 MG/DL (ref 1.6–2.4)
MAGNESIUM SERPL-MCNC: 2.1 MG/DL (ref 1.6–2.4)
MCH RBC QN AUTO: 32.5 PG (ref 26–34)
MCH RBC QN AUTO: 32.5 PG (ref 26–34)
MCHC RBC AUTO-ENTMCNC: 33.5 G/DL (ref 32–36)
MCHC RBC AUTO-ENTMCNC: 34.1 G/DL (ref 32–36)
MCV RBC AUTO: 95 FL (ref 80–100)
MCV RBC AUTO: 97 FL (ref 80–100)
METHADONE UR QL SCN: ABNORMAL
MITRAL VALVE E/A RATIO: 0.74
MONOCYTES # BLD AUTO: 0.39 X10*3/UL (ref 0.1–1)
MONOCYTES # BLD AUTO: 0.51 X10*3/UL (ref 0.1–1)
MONOCYTES NFR BLD AUTO: 6.1 %
MONOCYTES NFR BLD AUTO: 7.6 %
NEUTROPHILS # BLD AUTO: 2.74 X10*3/UL (ref 1.2–7.7)
NEUTROPHILS # BLD AUTO: 3.57 X10*3/UL (ref 1.2–7.7)
NEUTROPHILS NFR BLD AUTO: 40.9 %
NEUTROPHILS NFR BLD AUTO: 56.1 %
NON HDL CHOLESTEROL: 199 MG/DL (ref 0–149)
NRBC BLD-RTO: 0 /100 WBCS (ref 0–0)
NRBC BLD-RTO: 0 /100 WBCS (ref 0–0)
OPIATES UR QL SCN: ABNORMAL
OXYCODONE+OXYMORPHONE UR QL SCN: ABNORMAL
P AXIS: 61 DEGREES
P AXIS: 71 DEGREES
P OFFSET: 192 MS
P OFFSET: 198 MS
P ONSET: 140 MS
P ONSET: 140 MS
PCP UR QL SCN: ABNORMAL
PLATELET # BLD AUTO: 316 X10*3/UL (ref 150–450)
PLATELET # BLD AUTO: 355 X10*3/UL (ref 150–450)
POTASSIUM SERPL-SCNC: 3.5 MMOL/L (ref 3.5–5.3)
POTASSIUM SERPL-SCNC: 3.7 MMOL/L (ref 3.5–5.3)
PR INTERVAL: 154 MS
PR INTERVAL: 154 MS
PROT SERPL-MCNC: 6.9 G/DL (ref 6.4–8.2)
PROT SERPL-MCNC: 7.5 G/DL (ref 6.4–8.2)
PROTHROMBIN TIME: 12.4 SECONDS (ref 9.8–12.8)
Q ONSET: 217 MS
Q ONSET: 217 MS
QRS COUNT: 11 BEATS
QRS COUNT: 9 BEATS
QRS DURATION: 88 MS
QRS DURATION: 90 MS
QT INTERVAL: 416 MS
QT INTERVAL: 424 MS
QTC CALCULATION(BAZETT): 419 MS
QTC CALCULATION(BAZETT): 429 MS
QTC FREDERICIA: 421 MS
QTC FREDERICIA: 425 MS
R AXIS: 32 DEGREES
R AXIS: 46 DEGREES
RBC # BLD AUTO: 3.32 X10*6/UL (ref 4–5.2)
RBC # BLD AUTO: 3.91 X10*6/UL (ref 4–5.2)
RIGHT VENTRICLE FREE WALL PEAK S': 12.7 CM/S
RIGHT VENTRICLE PEAK SYSTOLIC PRESSURE: 31 MMHG
SODIUM SERPL-SCNC: 137 MMOL/L (ref 136–145)
SODIUM SERPL-SCNC: 142 MMOL/L (ref 136–145)
T AXIS: 35 DEGREES
T AXIS: 69 DEGREES
T OFFSET: 425 MS
T OFFSET: 429 MS
TRICUSPID ANNULAR PLANE SYSTOLIC EXCURSION: 2.7 CM
TRIGL SERPL-MCNC: 190 MG/DL (ref 0–149)
VENTRICULAR RATE: 59 BPM
VENTRICULAR RATE: 64 BPM
VLDL: 38 MG/DL (ref 0–40)
WBC # BLD AUTO: 6.4 X10*3/UL (ref 4.4–11.3)
WBC # BLD AUTO: 6.7 X10*3/UL (ref 4.4–11.3)

## 2024-12-06 PROCEDURE — 84484 ASSAY OF TROPONIN QUANT: CPT | Performed by: EMERGENCY MEDICINE

## 2024-12-06 PROCEDURE — 85347 COAGULATION TIME ACTIVATED: CPT | Performed by: INTERNAL MEDICINE

## 2024-12-06 PROCEDURE — 2500000004 HC RX 250 GENERAL PHARMACY W/ HCPCS (ALT 636 FOR OP/ED)

## 2024-12-06 PROCEDURE — 36415 COLL VENOUS BLD VENIPUNCTURE: CPT | Performed by: NURSE PRACTITIONER

## 2024-12-06 PROCEDURE — 2500000005 HC RX 250 GENERAL PHARMACY W/O HCPCS: Performed by: INTERNAL MEDICINE

## 2024-12-06 PROCEDURE — C1887 CATHETER, GUIDING: HCPCS | Performed by: INTERNAL MEDICINE

## 2024-12-06 PROCEDURE — 84484 ASSAY OF TROPONIN QUANT: CPT

## 2024-12-06 PROCEDURE — 93010 ELECTROCARDIOGRAM REPORT: CPT | Performed by: EMERGENCY MEDICINE

## 2024-12-06 PROCEDURE — 76937 US GUIDE VASCULAR ACCESS: CPT | Performed by: INTERNAL MEDICINE

## 2024-12-06 PROCEDURE — 93454 CORONARY ARTERY ANGIO S&I: CPT | Mod: 59 | Performed by: INTERNAL MEDICINE

## 2024-12-06 PROCEDURE — 71046 X-RAY EXAM CHEST 2 VIEWS: CPT

## 2024-12-06 PROCEDURE — C8929 TTE W OR WO FOL WCON,DOPPLER: HCPCS

## 2024-12-06 PROCEDURE — 93005 ELECTROCARDIOGRAM TRACING: CPT

## 2024-12-06 PROCEDURE — 85025 COMPLETE CBC W/AUTO DIFF WBC: CPT

## 2024-12-06 PROCEDURE — 82947 ASSAY GLUCOSE BLOOD QUANT: CPT

## 2024-12-06 PROCEDURE — 2500000001 HC RX 250 WO HCPCS SELF ADMINISTERED DRUGS (ALT 637 FOR MEDICARE OP)

## 2024-12-06 PROCEDURE — 96361 HYDRATE IV INFUSION ADD-ON: CPT

## 2024-12-06 PROCEDURE — C1894 INTRO/SHEATH, NON-LASER: HCPCS | Performed by: INTERNAL MEDICINE

## 2024-12-06 PROCEDURE — 1200000002 HC GENERAL ROOM WITH TELEMETRY DAILY

## 2024-12-06 PROCEDURE — 96365 THER/PROPH/DIAG IV INF INIT: CPT | Mod: 59

## 2024-12-06 PROCEDURE — 83735 ASSAY OF MAGNESIUM: CPT

## 2024-12-06 PROCEDURE — 99153 MOD SED SAME PHYS/QHP EA: CPT | Performed by: INTERNAL MEDICINE

## 2024-12-06 PROCEDURE — 92941 PRQ TRLML REVSC TOT OCCL AMI: CPT | Performed by: INTERNAL MEDICINE

## 2024-12-06 PROCEDURE — 99291 CRITICAL CARE FIRST HOUR: CPT

## 2024-12-06 PROCEDURE — C1760 CLOSURE DEV, VASC: HCPCS | Performed by: INTERNAL MEDICINE

## 2024-12-06 PROCEDURE — 99152 MOD SED SAME PHYS/QHP 5/>YRS: CPT | Performed by: INTERNAL MEDICINE

## 2024-12-06 PROCEDURE — C9606 PERC D-E COR REVASC W AMI S: HCPCS | Performed by: INTERNAL MEDICINE

## 2024-12-06 PROCEDURE — 2720000007 HC OR 272 NO HCPCS: Performed by: INTERNAL MEDICINE

## 2024-12-06 PROCEDURE — 75574 CT ANGIO HRT W/3D IMAGE: CPT | Performed by: STUDENT IN AN ORGANIZED HEALTH CARE EDUCATION/TRAINING PROGRAM

## 2024-12-06 PROCEDURE — 36415 COLL VENOUS BLD VENIPUNCTURE: CPT

## 2024-12-06 PROCEDURE — 96374 THER/PROPH/DIAG INJ IV PUSH: CPT | Mod: 59

## 2024-12-06 PROCEDURE — 2500000004 HC RX 250 GENERAL PHARMACY W/ HCPCS (ALT 636 FOR OP/ED): Mod: JG

## 2024-12-06 PROCEDURE — 93010 ELECTROCARDIOGRAM REPORT: CPT | Performed by: INTERNAL MEDICINE

## 2024-12-06 PROCEDURE — 71046 X-RAY EXAM CHEST 2 VIEWS: CPT | Performed by: RADIOLOGY

## 2024-12-06 PROCEDURE — 2500000005 HC RX 250 GENERAL PHARMACY W/O HCPCS

## 2024-12-06 PROCEDURE — 99223 1ST HOSP IP/OBS HIGH 75: CPT | Performed by: NURSE PRACTITIONER

## 2024-12-06 PROCEDURE — 2500000004 HC RX 250 GENERAL PHARMACY W/ HCPCS (ALT 636 FOR OP/ED): Performed by: INTERNAL MEDICINE

## 2024-12-06 PROCEDURE — 93306 TTE W/DOPPLER COMPLETE: CPT | Performed by: INTERNAL MEDICINE

## 2024-12-06 PROCEDURE — 99285 EMERGENCY DEPT VISIT HI MDM: CPT | Mod: 25 | Performed by: EMERGENCY MEDICINE

## 2024-12-06 PROCEDURE — C1769 GUIDE WIRE: HCPCS | Performed by: INTERNAL MEDICINE

## 2024-12-06 PROCEDURE — 71275 CT ANGIOGRAPHY CHEST: CPT | Performed by: RADIOLOGY

## 2024-12-06 PROCEDURE — 2500000001 HC RX 250 WO HCPCS SELF ADMINISTERED DRUGS (ALT 637 FOR MEDICARE OP): Performed by: NURSE PRACTITIONER

## 2024-12-06 PROCEDURE — 71275 CT ANGIOGRAPHY CHEST: CPT

## 2024-12-06 PROCEDURE — 2550000001 HC RX 255 CONTRASTS: Performed by: INTERNAL MEDICINE

## 2024-12-06 PROCEDURE — 2550000001 HC RX 255 CONTRASTS: Performed by: EMERGENCY MEDICINE

## 2024-12-06 PROCEDURE — 85347 COAGULATION TIME ACTIVATED: CPT

## 2024-12-06 PROCEDURE — 84484 ASSAY OF TROPONIN QUANT: CPT | Performed by: NURSE PRACTITIONER

## 2024-12-06 PROCEDURE — 93454 CORONARY ARTERY ANGIO S&I: CPT | Performed by: INTERNAL MEDICINE

## 2024-12-06 PROCEDURE — 027034Z DILATION OF CORONARY ARTERY, ONE ARTERY WITH DRUG-ELUTING INTRALUMINAL DEVICE, PERCUTANEOUS APPROACH: ICD-10-PCS | Performed by: INTERNAL MEDICINE

## 2024-12-06 PROCEDURE — C1725 CATH, TRANSLUMIN NON-LASER: HCPCS | Performed by: INTERNAL MEDICINE

## 2024-12-06 PROCEDURE — 2500000004 HC RX 250 GENERAL PHARMACY W/ HCPCS (ALT 636 FOR OP/ED): Performed by: NURSE PRACTITIONER

## 2024-12-06 PROCEDURE — 84075 ASSAY ALKALINE PHOSPHATASE: CPT

## 2024-12-06 PROCEDURE — 75574 CT ANGIO HRT W/3D IMAGE: CPT

## 2024-12-06 PROCEDURE — 80053 COMPREHEN METABOLIC PANEL: CPT

## 2024-12-06 PROCEDURE — C1874 STENT, COATED/COV W/DEL SYS: HCPCS | Performed by: INTERNAL MEDICINE

## 2024-12-06 PROCEDURE — B2111ZZ FLUOROSCOPY OF MULTIPLE CORONARY ARTERIES USING LOW OSMOLAR CONTRAST: ICD-10-PCS | Performed by: INTERNAL MEDICINE

## 2024-12-06 PROCEDURE — 96372 THER/PROPH/DIAG INJ SC/IM: CPT | Performed by: NURSE PRACTITIONER

## 2024-12-06 PROCEDURE — 83036 HEMOGLOBIN GLYCOSYLATED A1C: CPT

## 2024-12-06 PROCEDURE — 80307 DRUG TEST PRSMV CHEM ANLYZR: CPT | Performed by: NURSE PRACTITIONER

## 2024-12-06 PROCEDURE — 75710 ARTERY X-RAYS ARM/LEG: CPT | Performed by: INTERNAL MEDICINE

## 2024-12-06 PROCEDURE — 74174 CTA ABD&PLVS W/CONTRAST: CPT | Performed by: RADIOLOGY

## 2024-12-06 PROCEDURE — 85610 PROTHROMBIN TIME: CPT

## 2024-12-06 PROCEDURE — 80061 LIPID PANEL: CPT

## 2024-12-06 PROCEDURE — 99285 EMERGENCY DEPT VISIT HI MDM: CPT | Performed by: EMERGENCY MEDICINE

## 2024-12-06 PROCEDURE — 2500000002 HC RX 250 W HCPCS SELF ADMINISTERED DRUGS (ALT 637 FOR MEDICARE OP, ALT 636 FOR OP/ED): Performed by: INTERNAL MEDICINE

## 2024-12-06 PROCEDURE — 82077 ASSAY SPEC XCP UR&BREATH IA: CPT

## 2024-12-06 PROCEDURE — 82565 ASSAY OF CREATININE: CPT

## 2024-12-06 PROCEDURE — 85730 THROMBOPLASTIN TIME PARTIAL: CPT | Performed by: EMERGENCY MEDICINE

## 2024-12-06 PROCEDURE — 2780000003 HC OR 278 NO HCPCS: Performed by: INTERNAL MEDICINE

## 2024-12-06 DEVICE — STENT ONYXNG30022UX ONYX 3.00X22RX
Type: IMPLANTABLE DEVICE | Site: HEART | Status: FUNCTIONAL
Brand: ONYX FRONTIER™

## 2024-12-06 RX ORDER — METOPROLOL TARTRATE 100 MG/1
100 TABLET ORAL ONCE AS NEEDED
Status: DISCONTINUED | OUTPATIENT
Start: 2024-12-06 | End: 2024-12-07

## 2024-12-06 RX ORDER — FENTANYL CITRATE 50 UG/ML
INJECTION, SOLUTION INTRAMUSCULAR; INTRAVENOUS
Status: COMPLETED
Start: 2024-12-06 | End: 2024-12-06

## 2024-12-06 RX ORDER — LORAZEPAM 2 MG/ML
0.5 INJECTION INTRAMUSCULAR EVERY 5 MIN PRN
Status: DISCONTINUED | OUTPATIENT
Start: 2024-12-06 | End: 2024-12-07

## 2024-12-06 RX ORDER — LIDOCAINE 560 MG/1
1 PATCH PERCUTANEOUS; TOPICAL; TRANSDERMAL DAILY
Status: DISCONTINUED | OUTPATIENT
Start: 2024-12-06 | End: 2024-12-08 | Stop reason: HOSPADM

## 2024-12-06 RX ORDER — METOPROLOL TARTRATE 1 MG/ML
5 INJECTION, SOLUTION INTRAVENOUS ONCE AS NEEDED
Status: DISCONTINUED | OUTPATIENT
Start: 2024-12-06 | End: 2024-12-07

## 2024-12-06 RX ORDER — AMOXICILLIN 250 MG
2 CAPSULE ORAL 2 TIMES DAILY
Status: DISCONTINUED | OUTPATIENT
Start: 2024-12-06 | End: 2024-12-08 | Stop reason: HOSPADM

## 2024-12-06 RX ORDER — THIAMINE HYDROCHLORIDE 100 MG/ML
100 INJECTION, SOLUTION INTRAMUSCULAR; INTRAVENOUS DAILY
Status: DISCONTINUED | OUTPATIENT
Start: 2024-12-06 | End: 2024-12-07

## 2024-12-06 RX ORDER — NITROGLYCERIN 20 MG/100ML
5-200 INJECTION INTRAVENOUS CONTINUOUS
Status: DISCONTINUED | OUTPATIENT
Start: 2024-12-06 | End: 2024-12-06

## 2024-12-06 RX ORDER — HEPARIN SODIUM 1000 [USP'U]/ML
INJECTION, SOLUTION INTRAVENOUS; SUBCUTANEOUS AS NEEDED
Status: DISCONTINUED | OUTPATIENT
Start: 2024-12-06 | End: 2024-12-06 | Stop reason: HOSPADM

## 2024-12-06 RX ORDER — ASPIRIN 325 MG
325 TABLET ORAL ONCE
Status: COMPLETED | OUTPATIENT
Start: 2024-12-06 | End: 2024-12-06

## 2024-12-06 RX ORDER — ASPIRIN 81 MG/1
81 TABLET ORAL DAILY
Status: DISCONTINUED | OUTPATIENT
Start: 2024-12-06 | End: 2024-12-08 | Stop reason: HOSPADM

## 2024-12-06 RX ORDER — FENTANYL CITRATE 50 UG/ML
INJECTION, SOLUTION INTRAMUSCULAR; INTRAVENOUS AS NEEDED
Status: DISCONTINUED | OUTPATIENT
Start: 2024-12-06 | End: 2024-12-06 | Stop reason: HOSPADM

## 2024-12-06 RX ORDER — LIDOCAINE HYDROCHLORIDE AND EPINEPHRINE 10; 10 UG/ML; MG/ML
10 INJECTION, SOLUTION INFILTRATION; PERINEURAL ONCE
Status: DISCONTINUED | OUTPATIENT
Start: 2024-12-06 | End: 2024-12-07

## 2024-12-06 RX ORDER — ONDANSETRON 4 MG/1
4 TABLET, FILM COATED ORAL EVERY 8 HOURS PRN
Status: DISCONTINUED | OUTPATIENT
Start: 2024-12-06 | End: 2024-12-08 | Stop reason: HOSPADM

## 2024-12-06 RX ORDER — FOLIC ACID 1 MG/1
1 TABLET ORAL DAILY
Status: DISCONTINUED | OUTPATIENT
Start: 2024-12-06 | End: 2024-12-08 | Stop reason: HOSPADM

## 2024-12-06 RX ORDER — ATORVASTATIN CALCIUM 40 MG/1
40 TABLET, FILM COATED ORAL NIGHTLY
Status: DISCONTINUED | OUTPATIENT
Start: 2024-12-06 | End: 2024-12-07

## 2024-12-06 RX ORDER — LIDOCAINE HYDROCHLORIDE 20 MG/ML
INJECTION, SOLUTION INFILTRATION; PERINEURAL AS NEEDED
Status: DISCONTINUED | OUTPATIENT
Start: 2024-12-06 | End: 2024-12-06 | Stop reason: HOSPADM

## 2024-12-06 RX ORDER — ALBUTEROL SULFATE 90 UG/1
2 INHALANT RESPIRATORY (INHALATION) EVERY 6 HOURS PRN
COMMUNITY
End: 2024-12-06 | Stop reason: ENTERED-IN-ERROR

## 2024-12-06 RX ORDER — NITROGLYCERIN 0.4 MG/1
0.8 TABLET SUBLINGUAL ONCE
Status: COMPLETED | OUTPATIENT
Start: 2024-12-06 | End: 2024-12-06

## 2024-12-06 RX ORDER — SODIUM NITROPRUSSIDE 25 MG/ML
INJECTION INTRAVENOUS CONTINUOUS PRN
Status: COMPLETED | OUTPATIENT
Start: 2024-12-06 | End: 2024-12-06

## 2024-12-06 RX ORDER — ASPIRIN 325 MG
TABLET ORAL
Status: COMPLETED
Start: 2024-12-06 | End: 2024-12-06

## 2024-12-06 RX ORDER — ONDANSETRON 4 MG/1
4 TABLET, ORALLY DISINTEGRATING ORAL ONCE
Status: COMPLETED | OUTPATIENT
Start: 2024-12-06 | End: 2024-12-06

## 2024-12-06 RX ORDER — METHOCARBAMOL 100 MG/ML
1000 INJECTION, SOLUTION INTRAMUSCULAR; INTRAVENOUS ONCE
Status: COMPLETED | OUTPATIENT
Start: 2024-12-06 | End: 2024-12-06

## 2024-12-06 RX ORDER — LORAZEPAM 0.5 MG/1
0.5 TABLET ORAL ONCE
Status: COMPLETED | OUTPATIENT
Start: 2024-12-06 | End: 2024-12-06

## 2024-12-06 RX ORDER — METOPROLOL TARTRATE 100 MG/1
100 TABLET ORAL ONCE
Status: DISCONTINUED | OUTPATIENT
Start: 2024-12-06 | End: 2024-12-06

## 2024-12-06 RX ORDER — ENOXAPARIN SODIUM 100 MG/ML
40 INJECTION SUBCUTANEOUS EVERY 24 HOURS
Status: DISCONTINUED | OUTPATIENT
Start: 2024-12-06 | End: 2024-12-06 | Stop reason: ALTCHOICE

## 2024-12-06 RX ORDER — HEPARIN SODIUM 10000 [USP'U]/100ML
0-4000 INJECTION, SOLUTION INTRAVENOUS CONTINUOUS
Status: DISCONTINUED | OUTPATIENT
Start: 2024-12-06 | End: 2024-12-06

## 2024-12-06 RX ORDER — NITROGLYCERIN 20 MG/100ML
INJECTION INTRAVENOUS
Status: COMPLETED
Start: 2024-12-06 | End: 2024-12-06

## 2024-12-06 RX ORDER — MIDAZOLAM HYDROCHLORIDE 1 MG/ML
INJECTION, SOLUTION INTRAMUSCULAR; INTRAVENOUS AS NEEDED
Status: DISCONTINUED | OUTPATIENT
Start: 2024-12-06 | End: 2024-12-06 | Stop reason: HOSPADM

## 2024-12-06 RX ORDER — ACETAMINOPHEN 325 MG/1
975 TABLET ORAL ONCE
Status: COMPLETED | OUTPATIENT
Start: 2024-12-06 | End: 2024-12-06

## 2024-12-06 RX ORDER — LOSARTAN POTASSIUM 50 MG/1
100 TABLET ORAL DAILY
Status: DISCONTINUED | OUTPATIENT
Start: 2024-12-06 | End: 2024-12-08 | Stop reason: HOSPADM

## 2024-12-06 RX ORDER — AMLODIPINE BESYLATE 5 MG/1
5 TABLET ORAL DAILY
Status: DISCONTINUED | OUTPATIENT
Start: 2024-12-06 | End: 2024-12-08 | Stop reason: HOSPADM

## 2024-12-06 RX ORDER — SODIUM NITROPRUSSIDE IN 0.9% SODIUM CHLORIDE 0.5 MG/ML
INJECTION INTRAVENOUS
Status: DISPENSED
Start: 2024-12-06 | End: 2024-12-07

## 2024-12-06 RX ORDER — EPTIFIBATIDE 2 MG/ML
INJECTION, SOLUTION INTRAVENOUS CONTINUOUS PRN
Status: COMPLETED | OUTPATIENT
Start: 2024-12-06 | End: 2024-12-06

## 2024-12-06 RX ORDER — LANOLIN ALCOHOL/MO/W.PET/CERES
100 CREAM (GRAM) TOPICAL DAILY
Status: DISCONTINUED | OUTPATIENT
Start: 2024-12-09 | End: 2024-12-06 | Stop reason: SDUPTHER

## 2024-12-06 RX ORDER — LIDOCAINE HYDROCHLORIDE AND EPINEPHRINE 5; 5 MG/ML; UG/ML
5 INJECTION, SOLUTION INFILTRATION; PERINEURAL ONCE
Status: COMPLETED | OUTPATIENT
Start: 2024-12-06 | End: 2024-12-06

## 2024-12-06 RX ORDER — SODIUM CHLORIDE 9 MG/ML
INJECTION, SOLUTION INTRAVENOUS CONTINUOUS PRN
Status: COMPLETED | OUTPATIENT
Start: 2024-12-06 | End: 2024-12-06

## 2024-12-06 RX ORDER — LORAZEPAM 1 MG/1
1 TABLET ORAL EVERY 2 HOUR PRN
Status: DISCONTINUED | OUTPATIENT
Start: 2024-12-06 | End: 2024-12-08 | Stop reason: HOSPADM

## 2024-12-06 RX ORDER — PANTOPRAZOLE SODIUM 40 MG/1
40 TABLET, DELAYED RELEASE ORAL
Status: DISCONTINUED | OUTPATIENT
Start: 2024-12-07 | End: 2024-12-08 | Stop reason: HOSPADM

## 2024-12-06 RX ORDER — METOPROLOL TARTRATE 1 MG/ML
5 INJECTION, SOLUTION INTRAVENOUS ONCE
Status: DISCONTINUED | OUTPATIENT
Start: 2024-12-06 | End: 2024-12-06

## 2024-12-06 RX ORDER — MULTIVIT-MIN/IRON FUM/FOLIC AC 7.5 MG-4
1 TABLET ORAL DAILY
Status: DISCONTINUED | OUTPATIENT
Start: 2024-12-06 | End: 2024-12-08 | Stop reason: HOSPADM

## 2024-12-06 RX ORDER — LIDOCAINE HYDROCHLORIDE AND EPINEPHRINE 10; 10 UG/ML; MG/ML
INJECTION, SOLUTION INFILTRATION; PERINEURAL AS NEEDED
Status: DISCONTINUED | OUTPATIENT
Start: 2024-12-06 | End: 2024-12-06 | Stop reason: HOSPADM

## 2024-12-06 RX ORDER — LORAZEPAM 0.5 MG/1
0.5 TABLET ORAL EVERY 2 HOUR PRN
Status: DISCONTINUED | OUTPATIENT
Start: 2024-12-06 | End: 2024-12-08 | Stop reason: HOSPADM

## 2024-12-06 RX ORDER — FENTANYL CITRATE 50 UG/ML
25 INJECTION, SOLUTION INTRAMUSCULAR; INTRAVENOUS ONCE
Status: COMPLETED | OUTPATIENT
Start: 2024-12-06 | End: 2024-12-06

## 2024-12-06 RX ORDER — ONDANSETRON HYDROCHLORIDE 2 MG/ML
4 INJECTION, SOLUTION INTRAVENOUS EVERY 8 HOURS PRN
Status: DISCONTINUED | OUTPATIENT
Start: 2024-12-06 | End: 2024-12-08 | Stop reason: HOSPADM

## 2024-12-06 RX ORDER — LANOLIN ALCOHOL/MO/W.PET/CERES
100 CREAM (GRAM) TOPICAL DAILY
Status: DISCONTINUED | OUTPATIENT
Start: 2024-12-06 | End: 2024-12-08 | Stop reason: HOSPADM

## 2024-12-06 RX ORDER — FENTANYL CITRATE 50 UG/ML
50 INJECTION, SOLUTION INTRAMUSCULAR; INTRAVENOUS ONCE
Status: COMPLETED | OUTPATIENT
Start: 2024-12-06 | End: 2024-12-06

## 2024-12-06 SDOH — SOCIAL STABILITY: SOCIAL INSECURITY: HAS ANYONE EVER THREATENED TO HURT YOUR FAMILY OR YOUR PETS?: NO

## 2024-12-06 SDOH — SOCIAL STABILITY: SOCIAL INSECURITY: HAVE YOU HAD THOUGHTS OF HARMING ANYONE ELSE?: NO

## 2024-12-06 SDOH — SOCIAL STABILITY: SOCIAL INSECURITY: DO YOU FEEL ANYONE HAS EXPLOITED OR TAKEN ADVANTAGE OF YOU FINANCIALLY OR OF YOUR PERSONAL PROPERTY?: NO

## 2024-12-06 SDOH — SOCIAL STABILITY: SOCIAL INSECURITY: ARE YOU OR HAVE YOU BEEN THREATENED OR ABUSED PHYSICALLY, EMOTIONALLY, OR SEXUALLY BY ANYONE?: NO

## 2024-12-06 SDOH — SOCIAL STABILITY: SOCIAL INSECURITY: ARE THERE ANY APPARENT SIGNS OF INJURIES/BEHAVIORS THAT COULD BE RELATED TO ABUSE/NEGLECT?: NO

## 2024-12-06 SDOH — SOCIAL STABILITY: SOCIAL INSECURITY: DO YOU FEEL UNSAFE GOING BACK TO THE PLACE WHERE YOU ARE LIVING?: NO

## 2024-12-06 SDOH — SOCIAL STABILITY: SOCIAL INSECURITY: HAVE YOU HAD ANY THOUGHTS OF HARMING ANYONE ELSE?: NO

## 2024-12-06 SDOH — SOCIAL STABILITY: SOCIAL INSECURITY: ABUSE: ADULT

## 2024-12-06 SDOH — SOCIAL STABILITY: SOCIAL INSECURITY: DOES ANYONE TRY TO KEEP YOU FROM HAVING/CONTACTING OTHER FRIENDS OR DOING THINGS OUTSIDE YOUR HOME?: NO

## 2024-12-06 SDOH — SOCIAL STABILITY: SOCIAL INSECURITY: WERE YOU ABLE TO COMPLETE ALL THE BEHAVIORAL HEALTH SCREENINGS?: YES

## 2024-12-06 ASSESSMENT — PAIN SCALES - GENERAL
PAINLEVEL_OUTOF10: 7
PAINLEVEL_OUTOF10: 0 - NO PAIN
PAINLEVEL_OUTOF10: 10 - WORST POSSIBLE PAIN
PAINLEVEL_OUTOF10: 10 - WORST POSSIBLE PAIN
PAINLEVEL_OUTOF10: 8
PAINLEVEL_OUTOF10: 0 - NO PAIN
PAINLEVEL_OUTOF10: 10 - WORST POSSIBLE PAIN
PAINLEVEL_OUTOF10: 10 - WORST POSSIBLE PAIN

## 2024-12-06 ASSESSMENT — LIFESTYLE VARIABLES
EVER HAD A DRINK FIRST THING IN THE MORNING TO STEADY YOUR NERVES TO GET RID OF A HANGOVER: NO
HOW OFTEN DURING THE LAST YEAR HAVE YOU NEEDED AN ALCOHOLIC DRINK FIRST THING IN THE MORNING TO GET YOURSELF GOING AFTER A NIGHT OF HEAVY DRINKING: NEVER
TOTAL SCORE: 0
AUDIT-C TOTAL SCORE: 5
ANXIETY: NO ANXIETY, AT EASE
AUDIT TOTAL SCORE: 5
HEADACHE, FULLNESS IN HEAD: NOT PRESENT
AUDITORY DISTURBANCES: NOT PRESENT
AUDITORY DISTURBANCES: NOT PRESENT
HAVE YOU OR SOMEONE ELSE BEEN INJURED AS A RESULT OF YOUR DRINKING: NO
HOW OFTEN DO YOU HAVE A DRINK CONTAINING ALCOHOL: 4 OR MORE TIMES A WEEK
HOW OFTEN DURING THE LAST YEAR HAVE YOU HAD A FEELING OF GUILT OR REMORSE AFTER DRINKING: NEVER
NAUSEA AND VOMITING: MILD NAUSEA WITH NO VOMITING
HOW OFTEN DURING THE LAST YEAR HAVE YOU FOUND THAT YOU WERE NOT ABLE TO STOP DRINKING ONCE YOU HAD STARTED: NEVER
HOW MANY STANDARD DRINKS CONTAINING ALCOHOL DO YOU HAVE ON A TYPICAL DAY: 3 OR 4
SUBSTANCE_ABUSE_PAST_12_MONTHS: YES
HAVE PEOPLE ANNOYED YOU BY CRITICIZING YOUR DRINKING: NO
ANXIETY: MILDLY ANXIOUS
EVER FELT BAD OR GUILTY ABOUT YOUR DRINKING: NO
TOTAL SCORE: 0
AUDIT-C TOTAL SCORE: 5
TOTAL SCORE: 2
HOW OFTEN DURING THE LAST YEAR HAVE YOU FAILED TO DO WHAT WAS NORMALLY EXPECTED FROM YOU BECAUSE OF DRINKING: NEVER
AUDIT TOTAL SCORE: 0
VISUAL DISTURBANCES: NOT PRESENT
HAS A RELATIVE, FRIEND, DOCTOR, OR ANOTHER HEALTH PROFESSIONAL EXPRESSED CONCERN ABOUT YOUR DRINKING OR SUGGESTED YOU CUT DOWN: NO
HOW OFTEN DURING THE LAST YEAR HAVE YOU BEEN UNABLE TO REMEMBER WHAT HAPPENED THE NIGHT BEFORE BECAUSE YOU HAD BEEN DRINKING: NEVER
ORIENTATION AND CLOUDING OF SENSORIUM: ORIENTED AND CAN DO SERIAL ADDITIONS
NAUSEA AND VOMITING: NO NAUSEA AND NO VOMITING
HOW OFTEN DO YOU HAVE 6 OR MORE DRINKS ON ONE OCCASION: NEVER
AGITATION: NORMAL ACTIVITY
PRESCIPTION_ABUSE_PAST_12_MONTHS: NO
TREMOR: NO TREMOR
HEADACHE, FULLNESS IN HEAD: NOT PRESENT
VISUAL DISTURBANCES: NOT PRESENT
AGITATION: NORMAL ACTIVITY
PAROXYSMAL SWEATS: NO SWEAT VISIBLE
HAVE YOU EVER FELT YOU SHOULD CUT DOWN ON YOUR DRINKING: NO
PAROXYSMAL SWEATS: NO SWEAT VISIBLE
TREMOR: NO TREMOR
SKIP TO QUESTIONS 9-10: 0
ORIENTATION AND CLOUDING OF SENSORIUM: ORIENTED AND CAN DO SERIAL ADDITIONS

## 2024-12-06 ASSESSMENT — COGNITIVE AND FUNCTIONAL STATUS - GENERAL
MOBILITY SCORE: 24
PATIENT BASELINE BEDBOUND: NO
DAILY ACTIVITIY SCORE: 24

## 2024-12-06 ASSESSMENT — PAIN - FUNCTIONAL ASSESSMENT
PAIN_FUNCTIONAL_ASSESSMENT: 0-10

## 2024-12-06 ASSESSMENT — ACTIVITIES OF DAILY LIVING (ADL)
FEEDING YOURSELF: INDEPENDENT
HEARING - LEFT EAR: FUNCTIONAL
ADEQUATE_TO_COMPLETE_ADL: YES
JUDGMENT_ADEQUATE_SAFELY_COMPLETE_DAILY_ACTIVITIES: YES
TOILETING: INDEPENDENT
PATIENT'S MEMORY ADEQUATE TO SAFELY COMPLETE DAILY ACTIVITIES?: YES
GROOMING: INDEPENDENT
BATHING: INDEPENDENT
DRESSING YOURSELF: INDEPENDENT
WALKS IN HOME: INDEPENDENT
HEARING - RIGHT EAR: FUNCTIONAL
LACK_OF_TRANSPORTATION: YES

## 2024-12-06 ASSESSMENT — PATIENT HEALTH QUESTIONNAIRE - PHQ9
2. FEELING DOWN, DEPRESSED OR HOPELESS: SEVERAL DAYS
1. LITTLE INTEREST OR PLEASURE IN DOING THINGS: NOT AT ALL
SUM OF ALL RESPONSES TO PHQ9 QUESTIONS 1 & 2: 1

## 2024-12-06 ASSESSMENT — ENCOUNTER SYMPTOMS
NEUROLOGICAL NEGATIVE: 1
MUSCULOSKELETAL NEGATIVE: 1
CONSTITUTIONAL NEGATIVE: 1
GASTROINTESTINAL NEGATIVE: 1
EYES NEGATIVE: 1

## 2024-12-06 ASSESSMENT — COLUMBIA-SUICIDE SEVERITY RATING SCALE - C-SSRS
2. HAVE YOU ACTUALLY HAD ANY THOUGHTS OF KILLING YOURSELF?: NO
6. HAVE YOU EVER DONE ANYTHING, STARTED TO DO ANYTHING, OR PREPARED TO DO ANYTHING TO END YOUR LIFE?: NO
1. IN THE PAST MONTH, HAVE YOU WISHED YOU WERE DEAD OR WISHED YOU COULD GO TO SLEEP AND NOT WAKE UP?: NO

## 2024-12-06 ASSESSMENT — PAIN DESCRIPTION - PAIN TYPE: TYPE: ACUTE PAIN

## 2024-12-06 ASSESSMENT — PAIN DESCRIPTION - LOCATION: LOCATION: CHEST

## 2024-12-06 ASSESSMENT — PAIN DESCRIPTION - DESCRIPTORS: DESCRIPTORS: ACHING

## 2024-12-06 NOTE — H&P
"History Of Present Illness  Dorinda Benson is a 69 y.o. female with PMHx of HTN, prediabetes (last A1c 5.9 on August 2024), DL presents to the ED with chest pain. CTA with occluded RCA. Troponin 1226. EKG with no ST changes.   Patient brought into the lab for coronary angiogram and possible PCI.   Loaded with aspirin and heparin in ED.   Echo with normal EF and RV function.      Past Medical History  Past Medical History:   Diagnosis Date    Asthma     COPD (chronic obstructive pulmonary disease) (Multi)     HLD (hyperlipidemia)     Hypertension        Surgical History  Past Surgical History:   Procedure Laterality Date    HYSTERECTOMY          Social History  She reports that she has been smoking cigarettes. She has never used smokeless tobacco. She reports current alcohol use. She reports current drug use. Drug: Marijuana.    Family History  Family History   Problem Relation Name Age of Onset    Dementia Mother      Alcohol abuse Brother      Other (bowel obstruction) Brother      Alcohol abuse Other          Allergies  Aspirin and Latex    Review of Systems     Physical Exam   General: NAD, AOx3  HEENT: EOMI, MMM, no LAD, no thyroid nodule or enlargement.   Neck: -JVD, supple  Cardiac: Normal S, S2. No murmurs noted  Lungs:  Good bilateral air entry, clear lungs bilaterally  Abdomen: Soft, non-tender, Non-distended   Extremities: No LE edema   Neuro: AOx3, no apparent focal deficits    Last Recorded Vitals  Blood pressure 140/87, pulse 65, temperature 36.7 °C (98.1 °F), temperature source Temporal, resp. rate 16, height 1.626 m (5' 4\"), weight 77.1 kg (170 lb), SpO2 100%.    Relevant Results         Assessment/Plan   Assessment & Plan  Chest pain    Non-ST elevation (NSTEMI) myocardial infarction (Multi)  Dorinda Benson is a 69 y.o. female with PMHx of HTN, prediabetes (last A1c 5.9 on August 2024), DL presents to the ED with chest pain. CTA with occluded RCA. Troponin 1226. EKG with no ST changes.   Patient brought " into the lab for coronary angiogram and possible PCI.             Sebas Nelson MD

## 2024-12-06 NOTE — ASSESSMENT & PLAN NOTE
Dorinda Benson is a 69 y.o. female with PMHx of HTN, prediabetes (last A1c 5.9 on August 2024), DL presents to the ED with chest pain. CTA with occluded RCA. Troponin 1226. EKG with no ST changes.   Patient brought into the lab for coronary angiogram and possible PCI.

## 2024-12-06 NOTE — ED TRIAGE NOTES
Pt presented via ems for chest pain. She endorsed that this started while she was asleep and woke her from her sleep.  She arrived to the department intoxicated with etoh, but denies any other drug use.  Pmh of htn. Pt states never having issue like this in the past. Also states the pain is 10/10 left sided with left arm pain associated with.

## 2024-12-06 NOTE — SIGNIFICANT EVENT
69-year-old female with a PMH of Hypertension presented to the ED with left-sided chest pressure associated with diaphoresis that woke her up from her sleep. Initial troponin 120 and then up trended to 296 and is now 1,226. The patient's chest pain persisted despite sublingual nitroglycerin and she is now on a nitroglycerin drip, heparin drip, and was loaded with ASA. EKG sinus bradycardia without any acute ischemic changes and no evidence of Q-waves. Coronary CTA revealed evidence of complete occlusion of the mid RCA with distal reconstitution. TTE with preserved EF (70%) with no wall motion abnormalities. No significant valvular abnormalities noted. Given that the patient was still having chest pain despite being on nitroglycerin drip, the decision was made to take the patient to the cath lab. After the cath, the patient has a bed available in the CICU.    Case discussed with Dr. Calvert, interventional fellow Maulik Rodriguez and General fellow Sebas Nelson.

## 2024-12-06 NOTE — H&P
HPI     Ms Benson is a 69-year-old female with PMHx: HTN, HLD, prediabetes, EtOH abuse who presented to the ED today with complaint of left-sided chest pain.  Patient states she was at work yesterday and started with left-sided chest heaviness when she got out of work she drank a couple of drinks and had some dinner and states it seemed like the chest pain resolved especially when she relaxed.  Patient states that she then went to bed about midnight and was awoken suddenly in the middle the night diaphoretic left-sided chest heaviness and pressure that almost felt like a spasm.  States it went down her left arm and she had some slight nausea along with it.  States she has never had this before and nothing has made it better or worse at this time.  Patient also admits to drinking 4 beers a night with a shot in each beer.  Discussed alcohol withdrawal symptoms with her while she is in the hospital.  During exam patient stated that chest pain came back.  Patient admits to smoking a pack every 3 to 4 days.  Also states that her mother  this year in August and she was her mother's primary caregiver and she is feels alone at this time.  Patient denies any suicidal homicidal ideations states just very sad  about the holidays without her mother.    While in the ED patients vitals were stable HR was slightly fernando at 60 and labs were WNL except troponin went from 120->134. Utox + for marijuana but patient admitted to this in interview and eTOH was 51.  EKG was NSR.  CT C/A/P was WNL and CXR showed no acute cardiopulmonary process.  Patient to be admitted observation for chest pain.    ROS/EXAM     Review of Systems   Constitutional:  Negative.    HENT: Negative.     Eyes: Negative.    Cardiovascular:  Positive for chest pain.   Gastrointestinal: Negative.    Genitourinary: Negative.    Musculoskeletal: Negative.    Skin: Negative.    Neurological: Negative.    All other systems reviewed and are negative.    Physical Exam  Vitals reviewed.   Constitutional:       Appearance: Normal appearance.   HENT:      Head: Normocephalic.      Mouth/Throat:      Mouth: Mucous membranes are dry.   Eyes:      Extraocular Movements: Extraocular movements intact.      Conjunctiva/sclera: Conjunctivae normal.      Pupils: Pupils are equal, round, and reactive to light.   Cardiovascular:      Rate and Rhythm: Regular rhythm. Bradycardia present.      Pulses: Normal pulses.      Heart sounds: Normal heart sounds, S1 normal and S2 normal.   Pulmonary:      Effort: Pulmonary effort is normal.      Breath sounds: Normal breath sounds and air entry.   Abdominal:      General: Abdomen is flat. Bowel sounds are normal.      Palpations: Abdomen is soft.   Musculoskeletal:         General: Normal range of motion.      Cervical back: Full passive range of motion without pain and normal range of motion.   Skin:     General: Skin is warm and dry.   Neurological:      General: No focal deficit present.      Mental Status: She is alert and oriented to person, place, and time. Mental status is at baseline.   Psychiatric:         Attention and Perception: Attention normal.         Mood and Affect: Mood normal.         Speech: Speech normal.         Histories     Past Medical History:   Diagnosis Date    Asthma     COPD (chronic obstructive pulmonary disease) (Multi)     HLD (hyperlipidemia)     Hypertension      Past Surgical History:   Procedure Laterality Date    HYSTERECTOMY       Family History   Problem Relation Name Age of Onset    Dementia Mother      Alcohol abuse Brother      Other (bowel obstruction) Brother      Alcohol abuse Other        reports that she has been  "smoking cigarettes. She has never used smokeless tobacco. She reports current alcohol use. She reports current drug use. Drug: Marijuana.    Vitals/LABS/RESULTS     Last Recorded Vitals  Blood pressure 138/75, pulse 71, temperature 36.7 °C (98.1 °F), temperature source Temporal, resp. rate 16, height 1.626 m (5' 4\"), weight 77.1 kg (170 lb), SpO2 98%.  Intake/Output last 3 Shifts:  No intake/output data recorded.    Relevant Results  Lab Results   Component Value Date    WBC 6.7 12/06/2024    HGB 12.7 12/06/2024    HCT 37.2 12/06/2024    MCV 95 12/06/2024     12/06/2024      Lab Results   Component Value Date    GLUCOSE 106 (H) 12/06/2024    CALCIUM 10.1 12/06/2024     12/06/2024    K 3.7 12/06/2024    CO2 27 12/06/2024     12/06/2024    BUN 13 12/06/2024    CREATININE 0.70 12/06/2024     CT angio chest abdomen pelvis    Result Date: 12/6/2024  Interpreted By:  Kofi Green,  and Sivan Clemens STUDY: CT ANGIO CHEST ABDOMEN PELVIS;  12/6/2024 9:38 am   INDICATION: Signs/Symptoms:chest pain, poor historian.     COMPARISON: CT lung screening from 05/29/2020.   ACCESSION NUMBER(S): [id number]   ORDERING CLINICIAN: TRICE VELASQUEZ   TECHNIQUE: Axial non-contrast images of the chest abdomen, and pelvis.   Axial CT images of the chest, abdomen and pelvis were obtained after the intravenous administration of 90 mL Omnipaque 350 using angiographic technique with coronal and sagittal reformatted images. MIP images and 3D reconstructions were created on an independent workstation and reviewed.   FINDINGS: VASCULATURE:   PULMONARY ARTERIES: The pulmonary arteries are of normal caliber. No acute pulmonary embolism.   THORACIC AORTA: Non-contrast images show no evidence of acute intramural hematoma. No thoracic aortic aneurysm or dissection. No significant thoracic aortic atherosclerosis.   ABDOMINAL AORTA: No abdominal aortic aneurysm or dissection. No significant abdominal aortic atherosclerosis.   " ABDOMINAL AND PELVIC ARTERIES:   Celiac artery demonstrates no significant focal stenosis.   Superior mesenteric artery demonstrates no significant focal stenosis.   Inferior mesenteric artery demonstrates no significant focal stenosis.   There is a single right renal artery.  Right renal artery demonstrates no significant focal stenosis.   There is a single left renal artery.  Left renal artery demonstrates no significant focal stenosis.   RIGHT LEG: Right common iliac artery is widely patent with no significant stenosis. Right external iliac artery is widely patent with no significant stenosis. Right internal iliac artery is widely patent with no significant stenosis. Right common femoral artery is widely patent with no significant stenosis. Visualized proximal  right profunda femoris artery is widely patent with no significant stenosis. Visualized proximal  right superficial femoral artery is widely patent with no significant stenosis.   LEFT LEG: Left common iliac artery is widely patent with no significant stenosis. Left external iliac artery is widely patent with no significant stenosis. Left internal iliac artery is widely patent with no significant stenosis. Left common femoral artery is widely patent with no significant stenosis. Visualized proximal  left profunda femoris artery is widely patent with no significant stenosis. Visualized proximal  left superficial femoral artery is widely patent with no significant stenosis.     CHEST:   LUNG/PLEURA/LARGE AIRWAYS: Stable 0.3 cm left lower lung pulmonary nodule likely representing an intra fissural pulmonary nodule which is similar to prior imaging. (Series 506, image 152). There is no pleural effusion or pneumothorax.   OTHER VESSELS: No significant abnormality.   HEART: The heart is normal in size. There is no pericardial effusion.   MEDIASTINUM AND CALISTA: No mediastinal, hilar or axillary lymphadenopathy is present by CT criteria. The esophagus is  non-dilated and appears normal in entire length.   CHEST WALL AND LOWER NECK: The soft tissues of the chest wall demonstrate no gross abnormality. The visualized thyroid gland appears within normal limits. There is a 0.5 cm right thyroid nodule for which no follow-up is required. (Series 501, image 22)   ABDOMEN:   LIVER: The liver is normal in size measuring 14.5 cm in craniocaudal dimensions without evidence of focal liver lesions.   BILE DUCTS: The intrahepatic and extrahepatic ducts are not dilated.   GALLBLADDER: The gallbladder is non-distended and without evidence of radiopaque stones.   PANCREAS: The pancreas appears unremarkable without evidence of ductal dilatation or masses.   SPLEEN: The spleen is normal in size measuring 7.1 cm in maximum coronal oblique axis without focal lesions.   ADRENAL GLANDS: Bilateral adrenal glands appear normal.   KIDNEYS AND URETERS: The kidneys are normal in size and enhance symmetrically. 0.8 cm cyst of the superior pole of the right kidney which is too small to characterize statistically represents simple cysts (series 601, image 282) no hydroureteronephrosis or nephroureterolithiasis is identified.   PELVIS:   BLADDER: The urinary bladder appears normal without abnormal wall thickening.   REPRODUCTIVE ORGANS: Uterus is surgically absent.   VESSELS: Please see vasculature above. The IVC appears normal.   BOWEL: The stomach, small and large bowel are normal in appearance without wall thickening or obstruction. Colonic diverticulosis without evidence of diverticulitis. The small and large bowel are normal in caliber. The appendix appears normal.   PERITONEUM/RETROPERITONEUM/LYMPH NODES: There is no free or loculated fluid collection, no free intraperitoneal air. The retroperitoneum appears normal. No abdominopelvic lymphadenopathy is present by CT criteria.   BONE AND SOFT TISSUE: No suspicious osseous lesions are identified. Degenerative changes are present throughout the  thoracolumbar spine. The abdominal wall soft tissues appear normal.       VASCULAR: 1. No thoracic or abdominal aortic aneurysm or acute aortic pathology.   CHEST ABDOMEN PELVIS: 1. No acute abnormality of the chest, abdomen or pelvis. 2. Additional incidental non-acute findings as detailed above.     I personally reviewed the images/study and I agree with the findings as stated by Dr. Jayleen Finnegan. This study was interpreted at [place], [place].   MACRO: None.   Signed by: Kofi Green 12/6/2024 10:43 AM Dictation workstation:   [id number]    XR chest 2 views    Result Date: 12/6/2024  Interpreted By:  Subhash Isaacs  and Naseem Wolfe STUDY: XR CHEST 2 VIEWS;  12/6/2024 9:12 am   INDICATION: Signs/Symptoms:chest pain.   COMPARISON: CT lung screening 05/29/2024   ACCESSION NUMBER(S): [id number]   ORDERING CLINICIAN: VICENTE MACHUCA   FINDINGS: PA and lateral radiographs of the chest were provided.   CARDIOMEDIASTINAL SILHOUETTE: Cardiomediastinal silhouette is mildly enlarged in size and configuration.   LUNGS: Lungs are clear.   ABDOMEN: No remarkable upper abdominal findings.   BONES: No acute osseous changes.       1.  No evidence of acute cardiopulmonary process.   I personally reviewed the images/study and I agree with the findings as stated by resident physician Aiden Gusman MD. This study was interpreted at [facility], [place], [place].   MACRO: None   Signed by: Subhash Isaacs 12/6/2024 9:45 AM Dictation workstation:   [id number]      Medications     Scheduled medications  amLODIPine, 5 mg, oral, Daily  aspirin, 81 mg, oral, Daily  enoxaparin, 40 mg, subcutaneous, q24h  folic acid, 1 mg, oral, Daily  lidocaine, 1 patch, transdermal, Daily  losartan, 100 mg, oral, Daily  multivitamin with minerals, 1 tablet, oral, Daily  perflutren lipid microspheres, 0.5-10 mL of dilution, intravenous, Once in imaging  perflutren protein A  microsphere, 0.5 mL, intravenous, Once in imaging  sennosides-docusate sodium, 2 tablet, oral, BID  sulfur hexafluoride microsphr, 2 mL, intravenous, Once in imaging  thiamine, 100 mg, oral, Daily  thiamine, 100 mg, intravenous, Daily      Continuous medications     PRN medications  PRN medications: LORazepam **OR** LORazepam **OR** LORazepam    PLAN     Ms Benson is a 69-year-old female with PMHx: HTN, HLD, prediabetes, EtOH abuse who presented to the ED today with complaint of left-sided chest pain.  Patient states she was at work yesterday and started with left-sided chest heaviness when she got out of work she drank a couple of drinks and had some dinner and states it seemed like the chest pain resolved especially when she relaxed.  Patient states that she then went to bed about midnight and was awoken suddenly in the middle the night diaphoretic left-sided chest heaviness and pressure that almost felt like a spasm.  States it went down her left arm and she had some slight nausea along with it.  States she has never had this before and nothing has made it better or worse at this time.  Patient also admits to drinking 4 beers a night with a shot in each beer.  Discussed alcohol withdrawal symptoms with her while she is in the hospital.  During exam patient stated that chest pain came back.  Patient admits to smoking a pack every 3 to 4 days.  Also states that her mother  this year in August and she was her mother's primary caregiver and she is feels alone at this time.  Patient denies any suicidal homicidal ideations states just very sad  about the holidays without her mother.    While in the ED patients vitals were stable HR was slightly fernando at 60 and labs were WNL except troponin went from 120->134. Utox + for marijuana but patient admitted to this in interview and eTOH was 51.  EKG was NSR.  CT C/A/P was WNL and CXR showed no acute cardiopulmonary process.  Patient to be admitted observation for chest  pain.  Assessment/Plan:    Chest pain  -Troponin in -> 134  -A1C 5.9  5/9/24  -Will repeat troponin for current chest pain and will also get EKG  -Will order echo  -Will order CT angio coronary  -Patient will need outpatient follow-up with cardiology  -Will order EC baby ASA--patient allergy is actually upset stomach and not true allergy  -Will order Protonix 40 mg daily    HTN  HLD  -Continue home amlodipine 5 mg daily  -Continue home Lexy 100 mg daily  -Start atorvastatin 40 mg daily    ETOH  -CIWA with Ativan p.o.  -Folic acid/thiamine/MVI  -Will consult SW for thrive    Fluids: monitor and replete as needed  Electrolytes: monitor and replete as needed  Nutrition: Regular diet   GI prophylaxis: Protonix 40mg QD  DVT prophylaxis: SCD/lovenox  Code Status: full code  PCP  Pharmacy    Disposition:  Admitted for above diagnoses. Plan per above. Anticipate observation stay      JULIEN Alexander-CNP         I spent 75 minutes in the professional and overall care on this encounter before, during and after the visit, examining the patient, reviewing labs, writing orders and documenting the note

## 2024-12-06 NOTE — PROGRESS NOTES
"Pharmacy Medication History Review    Dorinda Benson is a 69 y.o. female admitted for Chest pain. Pharmacy reviewed the patient's [id number] medications and allergies for accuracy.    Medications ADDED:  Benadryl   Medications CHANGED:  N/A  Medications REMOVED/NO LONGER TAKING:   Lipitor  Zyrtec  Vitamin D3  Lidex  Flonase      The list below reflects the updated PTA list.   Prior to Admission Medications   Prescriptions Last Dose Informant   amLODIPine (Norvasc) 5 mg tablet 12/5/2024 Evening Self   Sig: TAKE 1 TABLET BY MOUTH ONCE DAILY   diphenhydramine HCl (BENADRYL ALLERGY ORAL) Past Month Self   Sig: Take 1 tablet by mouth as needed at bedtime.   losartan (Cozaar) 100 mg tablet 12/5/2024 at 11:00 PM Self   Sig: Take 1 tablet (100 mg) by mouth once daily.      Facility-Administered Medications: None        The list below reflects the updated allergy list. Please review each documented allergy for additional clarification and justification.  Allergies  Reviewed by Breanna Phoenix on 12/6/2024        Severity Reactions Comments    Aspirin Not Specified Other Per provider, patient gets upset stomach when taking aspirin    Latex Not Specified Hives, Itching             Patient accepts M2B at discharge.     Sources:   [facility]  Pharmacy dispense history  Patient interview Good historian  Chart Review     Additional Comments:  N/A      BREANNA RENEE [place]EN[place]  Pharmacy Technician  12/06/24     Secure Chat preferred   If no response call [phone] or [facility] \"Med Rec\"   "

## 2024-12-06 NOTE — ED PROVIDER NOTES
History of Present Illness     History provided by: Patient and EMS  Limitations to History: Intoxication  External Records Reviewed with Brief Summary: None    HPI:  Dorinda Benson is a 69 y.o. female past medical history of hypertension presents today for 1 day of chest pain.  Patient states that she was at work began having chest pain that radiated down her left arm when she was going upstairs.  She describes it as a chest pressure that is nagging in nature.  She endorses some shortness of breath as well as nausea associated with it.  She denies any vomiting, radiation of the pain to her back.  She denies any neck pain or radiation of the pain into her neck.  She denies any loss of consciousness, no changes in her vision difficulty swallowing or speaking.  She denies any numbness weakness tingling in arms or legs.  She denies any abdominal pain.  Denies any recent illnesses, no cough, sore throat, fever, chills, body aches.  Denies any known sick contacts.    Physical Exam   Triage vitals:  T 36.7 °C (98.1 °F)  HR 68  /78  RR 16  O2 98 % None (Room air)    Physical Exam  Vitals and nursing note reviewed.   Constitutional:       General: She is not in acute distress.     Appearance: Normal appearance. She is well-developed. She is not ill-appearing or diaphoretic.      Comments: Uncomfortable appearing, otherwise no acute distress   HENT:      Head: Normocephalic and atraumatic.      Mouth/Throat:      Mouth: Mucous membranes are moist.      Pharynx: No oropharyngeal exudate or posterior oropharyngeal erythema.   Eyes:      General: No scleral icterus.     Extraocular Movements: Extraocular movements intact.      Pupils: Pupils are equal, round, and reactive to light.   Cardiovascular:      Rate and Rhythm: Normal rate and regular rhythm.      Pulses: Normal pulses.           Carotid pulses are 2+ on the right side and 2+ on the left side.       Radial pulses are 2+ on the right side and 2+ on the left  side.        Dorsalis pedis pulses are 2+ on the right side and 2+ on the left side.        Posterior tibial pulses are 2+ on the right side and 2+ on the left side.      Heart sounds: Normal heart sounds. No murmur heard.     No gallop.   Pulmonary:      Effort: Pulmonary effort is normal. No tachypnea or respiratory distress.      Breath sounds: Normal breath sounds. No stridor. No wheezing, rhonchi or rales.   Chest:      Chest wall: No mass or deformity.      Comments: Tenderness to palpation over left anterior chest wall  Abdominal:      General: Bowel sounds are normal. There is no distension.      Palpations: Abdomen is soft. There is no mass.      Tenderness: There is no abdominal tenderness.      Hernia: No hernia is present.   Musculoskeletal:         General: No swelling, deformity or signs of injury. Normal range of motion.      Cervical back: Normal range of motion and neck supple. No tenderness.      Right lower leg: No tenderness. No edema.      Left lower leg: No tenderness. No edema.   Skin:     General: Skin is warm.      Capillary Refill: Capillary refill takes less than 2 seconds.      Findings: No erythema, lesion or rash.      Nails: There is no clubbing.   Neurological:      General: No focal deficit present.      Mental Status: She is alert and oriented to person, place, and time. Mental status is at baseline.   Psychiatric:         Mood and Affect: Mood normal.         Behavior: Behavior normal.          Medical Decision Making & ED Course   Medical Decision Makin y.o. female past medical history of hypertension presents today for left-sided chest pain.  Patient does appear somewhat intoxicated given the fact that she is rolling around in bed and endorse alcohol use.  However, given the fact that she is having left sided chest pain that is radiating down her arm, we will get chest pain rule out labs including serial troponins.  Patient's initial EKG demonstrates no significant  abnormalities. Patient's initial troponin is 120, EKG at that time demonstrated no significant changes.  Serial troponin was 136.  Patient did have some symptomatic improvement with lidocaine patch methocarbamol, however, given the fact she has elevated troponins we will admit her for cardiac risk stratification.  Patient comfortable with this plan.  Patient was accepted by the [facility] beds at the [facility] coronary to cardiology service at this time.  ----      Differential diagnoses considered include but are not limited to: Cardiac chest pain, musculoskeletal chest wall pain,     Social Determinants of Health which Significantly Impact Care: None identified     EKG Independent Interpretation: EKG interpreted by myself. Please see ED Course for full interpretation.    Independent Result Review and Interpretation: Relevant laboratory and radiographic results were reviewed and independently interpreted by myself.  As necessary, they are commented on in the ED Course.    Chronic conditions affecting the patient's care: As documented above in [facility]    The patient was discussed with the following consultants/services: Admission Coordinator who accepted the patient for admission    Care Considerations: As documented above in [facility]    ED Course:  ED Course as of 12/06/24 1144   Fri Dec 06, 2024   0809 EKG demonstrates normal sinus rhythm with a rate of 64, normal VA QRS and QTc intervals, normal axis.  No evidence of acute ST changes, there are T wave inversions in aVR V1, which is a normal variant.  No significant changes from EKG most recently from 2006. [IB]      ED Course User Index  [IB] Marquez Quinones MD         Diagnoses as of 12/06/24 1144   Precordial pain   Alcoholic intoxication without complication (CMS-HCC)     Disposition   As a result of their workup, the patient will require admission to the hospital.  The patient was informed of her diagnosis.  The patient was given the opportunity to ask questions and I answered them. The  patient agreed to be admitted to the hospital.    Procedures   Procedures    Patient seen and discussed with ED attending physician.    Marquez Quinones MD  Emergency Medicine       Marquez Quinones MD  Resident  12/06/24 [id number]

## 2024-12-07 LAB
ALBUMIN SERPL BCP-MCNC: 4 G/DL (ref 3.4–5)
ANION GAP SERPL CALC-SCNC: 13 MMOL/L (ref 10–20)
BASOPHILS # BLD AUTO: 0.04 X10*3/UL (ref 0–0.1)
BASOPHILS NFR BLD AUTO: 0.6 %
BUN SERPL-MCNC: 10 MG/DL (ref 6–23)
CALCIUM SERPL-MCNC: 9.3 MG/DL (ref 8.6–10.6)
CHLORIDE SERPL-SCNC: 102 MMOL/L (ref 98–107)
CO2 SERPL-SCNC: 28 MMOL/L (ref 21–32)
CREAT SERPL-MCNC: 0.71 MG/DL (ref 0.5–1.05)
EGFRCR SERPLBLD CKD-EPI 2021: >90 ML/MIN/1.73M*2
EOSINOPHIL # BLD AUTO: 0.02 X10*3/UL (ref 0–0.7)
EOSINOPHIL NFR BLD AUTO: 0.3 %
ERYTHROCYTE [DISTWIDTH] IN BLOOD BY AUTOMATED COUNT: 12.5 % (ref 11.5–14.5)
GLUCOSE SERPL-MCNC: 105 MG/DL (ref 74–99)
HCT VFR BLD AUTO: 31.1 % (ref 36–46)
HGB BLD-MCNC: 10.9 G/DL (ref 12–16)
IMM GRANULOCYTES # BLD AUTO: 0.03 X10*3/UL (ref 0–0.7)
IMM GRANULOCYTES NFR BLD AUTO: 0.4 % (ref 0–0.9)
LYMPHOCYTES # BLD AUTO: 1.4 X10*3/UL (ref 1.2–4.8)
LYMPHOCYTES NFR BLD AUTO: 19.3 %
MAGNESIUM SERPL-MCNC: 2.17 MG/DL (ref 1.6–2.4)
MCH RBC QN AUTO: 33 PG (ref 26–34)
MCHC RBC AUTO-ENTMCNC: 35 G/DL (ref 32–36)
MCV RBC AUTO: 94 FL (ref 80–100)
MONOCYTES # BLD AUTO: 0.66 X10*3/UL (ref 0.1–1)
MONOCYTES NFR BLD AUTO: 9.1 %
NEUTROPHILS # BLD AUTO: 5.1 X10*3/UL (ref 1.2–7.7)
NEUTROPHILS NFR BLD AUTO: 70.3 %
NRBC BLD-RTO: 0 /100 WBCS (ref 0–0)
PHOSPHATE SERPL-MCNC: 4.7 MG/DL (ref 2.5–4.9)
PLATELET # BLD AUTO: 292 X10*3/UL (ref 150–450)
POTASSIUM SERPL-SCNC: 4.9 MMOL/L (ref 3.5–5.3)
RBC # BLD AUTO: 3.3 X10*6/UL (ref 4–5.2)
SODIUM SERPL-SCNC: 138 MMOL/L (ref 136–145)
WBC # BLD AUTO: 7.3 X10*3/UL (ref 4.4–11.3)

## 2024-12-07 PROCEDURE — 2500000001 HC RX 250 WO HCPCS SELF ADMINISTERED DRUGS (ALT 637 FOR MEDICARE OP)

## 2024-12-07 PROCEDURE — 84100 ASSAY OF PHOSPHORUS: CPT

## 2024-12-07 PROCEDURE — 2500000001 HC RX 250 WO HCPCS SELF ADMINISTERED DRUGS (ALT 637 FOR MEDICARE OP): Performed by: NURSE PRACTITIONER

## 2024-12-07 PROCEDURE — 80069 RENAL FUNCTION PANEL: CPT

## 2024-12-07 PROCEDURE — 83735 ASSAY OF MAGNESIUM: CPT

## 2024-12-07 PROCEDURE — 36415 COLL VENOUS BLD VENIPUNCTURE: CPT

## 2024-12-07 PROCEDURE — 99291 CRITICAL CARE FIRST HOUR: CPT

## 2024-12-07 PROCEDURE — 2500000002 HC RX 250 W HCPCS SELF ADMINISTERED DRUGS (ALT 637 FOR MEDICARE OP, ALT 636 FOR OP/ED)

## 2024-12-07 PROCEDURE — 85025 COMPLETE CBC W/AUTO DIFF WBC: CPT

## 2024-12-07 PROCEDURE — 1200000002 HC GENERAL ROOM WITH TELEMETRY DAILY

## 2024-12-07 RX ORDER — ENOXAPARIN SODIUM 100 MG/ML
40 INJECTION SUBCUTANEOUS EVERY 24 HOURS
Status: DISCONTINUED | OUTPATIENT
Start: 2024-12-07 | End: 2024-12-07

## 2024-12-07 RX ORDER — ENOXAPARIN SODIUM 100 MG/ML
40 INJECTION SUBCUTANEOUS EVERY 24 HOURS
Status: CANCELLED | OUTPATIENT
Start: 2024-12-07

## 2024-12-07 RX ORDER — ATORVASTATIN CALCIUM 80 MG/1
80 TABLET, FILM COATED ORAL NIGHTLY
Status: DISCONTINUED | OUTPATIENT
Start: 2024-12-07 | End: 2024-12-08 | Stop reason: HOSPADM

## 2024-12-07 RX ORDER — POTASSIUM CHLORIDE 20 MEQ/1
40 TABLET, EXTENDED RELEASE ORAL ONCE
Status: COMPLETED | OUTPATIENT
Start: 2024-12-07 | End: 2024-12-07

## 2024-12-07 RX ORDER — OXYCODONE HYDROCHLORIDE 5 MG/1
5 TABLET ORAL ONCE
Status: COMPLETED | OUTPATIENT
Start: 2024-12-07 | End: 2024-12-07

## 2024-12-07 RX ORDER — POLYETHYLENE GLYCOL 3350 17 G/17G
17 POWDER, FOR SOLUTION ORAL DAILY
Status: CANCELLED | OUTPATIENT
Start: 2024-12-07

## 2024-12-07 RX ORDER — ACETAMINOPHEN 325 MG/1
975 TABLET ORAL EVERY 6 HOURS PRN
Status: DISCONTINUED | OUTPATIENT
Start: 2024-12-07 | End: 2024-12-08 | Stop reason: HOSPADM

## 2024-12-07 RX ORDER — POLYETHYLENE GLYCOL 3350 17 G/17G
17 POWDER, FOR SOLUTION ORAL DAILY
Status: DISCONTINUED | OUTPATIENT
Start: 2024-12-07 | End: 2024-12-07

## 2024-12-07 SDOH — ECONOMIC STABILITY: FOOD INSECURITY: WITHIN THE PAST 12 MONTHS, THE FOOD YOU BOUGHT JUST DIDN'T LAST AND YOU DIDN'T HAVE MONEY TO GET MORE.: NEVER TRUE

## 2024-12-07 SDOH — SOCIAL STABILITY: SOCIAL INSECURITY: WITHIN THE LAST YEAR, HAVE YOU BEEN HUMILIATED OR EMOTIONALLY ABUSED IN OTHER WAYS BY YOUR PARTNER OR EX-PARTNER?: NO

## 2024-12-07 SDOH — HEALTH STABILITY: PHYSICAL HEALTH: ON AVERAGE, HOW MANY DAYS PER WEEK DO YOU ENGAGE IN MODERATE TO STRENUOUS EXERCISE (LIKE A BRISK WALK)?: 0 DAYS

## 2024-12-07 SDOH — ECONOMIC STABILITY: FOOD INSECURITY: WITHIN THE PAST 12 MONTHS, YOU WORRIED THAT YOUR FOOD WOULD RUN OUT BEFORE YOU GOT THE MONEY TO BUY MORE.: NEVER TRUE

## 2024-12-07 SDOH — SOCIAL STABILITY: SOCIAL INSECURITY
WITHIN THE LAST YEAR, HAVE YOU BEEN KICKED, HIT, SLAPPED, OR OTHERWISE PHYSICALLY HURT BY YOUR PARTNER OR EX-PARTNER?: NO

## 2024-12-07 SDOH — ECONOMIC STABILITY: HOUSING INSECURITY: AT ANY TIME IN THE PAST 12 MONTHS, WERE YOU HOMELESS OR LIVING IN A SHELTER (INCLUDING NOW)?: NO

## 2024-12-07 SDOH — ECONOMIC STABILITY: INCOME INSECURITY: IN THE PAST 12 MONTHS HAS THE ELECTRIC, GAS, OIL, OR WATER COMPANY THREATENED TO SHUT OFF SERVICES IN YOUR HOME?: NO

## 2024-12-07 SDOH — ECONOMIC STABILITY: HOUSING INSECURITY: IN THE LAST 12 MONTHS, WAS THERE A TIME WHEN YOU WERE NOT ABLE TO PAY THE MORTGAGE OR RENT ON TIME?: YES

## 2024-12-07 SDOH — HEALTH STABILITY: PHYSICAL HEALTH: ON AVERAGE, HOW MANY MINUTES DO YOU ENGAGE IN EXERCISE AT THIS LEVEL?: 0 MIN

## 2024-12-07 SDOH — ECONOMIC STABILITY: FOOD INSECURITY: HOW HARD IS IT FOR YOU TO PAY FOR THE VERY BASICS LIKE FOOD, HOUSING, MEDICAL CARE, AND HEATING?: SOMEWHAT HARD

## 2024-12-07 SDOH — SOCIAL STABILITY: SOCIAL INSECURITY: WITHIN THE LAST YEAR, HAVE YOU BEEN AFRAID OF YOUR PARTNER OR EX-PARTNER?: NO

## 2024-12-07 SDOH — SOCIAL STABILITY: SOCIAL INSECURITY
WITHIN THE LAST YEAR, HAVE YOU BEEN RAPED OR FORCED TO HAVE ANY KIND OF SEXUAL ACTIVITY BY YOUR PARTNER OR EX-PARTNER?: NO

## 2024-12-07 SDOH — HEALTH STABILITY: MENTAL HEALTH
DO YOU FEEL STRESS - TENSE, RESTLESS, NERVOUS, OR ANXIOUS, OR UNABLE TO SLEEP AT NIGHT BECAUSE YOUR MIND IS TROUBLED ALL THE TIME - THESE DAYS?: NOT AT ALL

## 2024-12-07 SDOH — ECONOMIC STABILITY: TRANSPORTATION INSECURITY: IN THE PAST 12 MONTHS, HAS LACK OF TRANSPORTATION KEPT YOU FROM MEDICAL APPOINTMENTS OR FROM GETTING MEDICATIONS?: YES

## 2024-12-07 SDOH — ECONOMIC STABILITY: HOUSING INSECURITY: IN THE PAST 12 MONTHS, HOW MANY TIMES HAVE YOU MOVED WHERE YOU WERE LIVING?: 0

## 2024-12-07 ASSESSMENT — LIFESTYLE VARIABLES
HEADACHE, FULLNESS IN HEAD: NOT PRESENT
AGITATION: NORMAL ACTIVITY
ANXIETY: NO ANXIETY, AT EASE
TOTAL SCORE: 0
ORIENTATION AND CLOUDING OF SENSORIUM: ORIENTED AND CAN DO SERIAL ADDITIONS
AGITATION: NORMAL ACTIVITY
AUDITORY DISTURBANCES: NOT PRESENT
ANXIETY: NO ANXIETY, AT EASE
TREMOR: NO TREMOR
NAUSEA AND VOMITING: NO NAUSEA AND NO VOMITING
ANXIETY: NO ANXIETY, AT EASE
ANXIETY: MILDLY ANXIOUS
TREMOR: NO TREMOR
ANXIETY: NO ANXIETY, AT EASE
AGITATION: NORMAL ACTIVITY
TREMOR: NO TREMOR
VISUAL DISTURBANCES: NOT PRESENT
NAUSEA AND VOMITING: NO NAUSEA AND NO VOMITING
ORIENTATION AND CLOUDING OF SENSORIUM: ORIENTED AND CAN DO SERIAL ADDITIONS
HEADACHE, FULLNESS IN HEAD: NOT PRESENT
VISUAL DISTURBANCES: NOT PRESENT
VISUAL DISTURBANCES: NOT PRESENT
TREMOR: NO TREMOR
PAROXYSMAL SWEATS: NO SWEAT VISIBLE
AUDITORY DISTURBANCES: NOT PRESENT
AUDITORY DISTURBANCES: NOT PRESENT
AGITATION: NORMAL ACTIVITY
PAROXYSMAL SWEATS: NO SWEAT VISIBLE
TREMOR: NO TREMOR
NAUSEA AND VOMITING: NO NAUSEA AND NO VOMITING
NAUSEA AND VOMITING: NO NAUSEA AND NO VOMITING
HEADACHE, FULLNESS IN HEAD: NOT PRESENT
NAUSEA AND VOMITING: NO NAUSEA AND NO VOMITING
ORIENTATION AND CLOUDING OF SENSORIUM: ORIENTED AND CAN DO SERIAL ADDITIONS
TOTAL SCORE: 0
VISUAL DISTURBANCES: NOT PRESENT
ANXIETY: NO ANXIETY, AT EASE
AUDITORY DISTURBANCES: NOT PRESENT
TOTAL SCORE: 0
TOTAL SCORE: 1
ORIENTATION AND CLOUDING OF SENSORIUM: ORIENTED AND CAN DO SERIAL ADDITIONS
ORIENTATION AND CLOUDING OF SENSORIUM: ORIENTED AND CAN DO SERIAL ADDITIONS
NAUSEA AND VOMITING: NO NAUSEA AND NO VOMITING
AUDITORY DISTURBANCES: NOT PRESENT
HEADACHE, FULLNESS IN HEAD: NOT PRESENT
ANXIETY: NO ANXIETY, AT EASE
BLOOD PRESSURE: 122/74
TOTAL SCORE: 0
NAUSEA AND VOMITING: NO NAUSEA AND NO VOMITING
TOTAL SCORE: 0
PAROXYSMAL SWEATS: NO SWEAT VISIBLE
TREMOR: NO TREMOR
AGITATION: NORMAL ACTIVITY
AGITATION: NORMAL ACTIVITY
HEADACHE, FULLNESS IN HEAD: NOT PRESENT
PULSE: 54
AUDITORY DISTURBANCES: NOT PRESENT
VISUAL DISTURBANCES: NOT PRESENT
ORIENTATION AND CLOUDING OF SENSORIUM: ORIENTED AND CAN DO SERIAL ADDITIONS
HEADACHE, FULLNESS IN HEAD: NOT PRESENT
ORIENTATION AND CLOUDING OF SENSORIUM: ORIENTED AND CAN DO SERIAL ADDITIONS
AGITATION: NORMAL ACTIVITY
TOTAL SCORE: 0
HEADACHE, FULLNESS IN HEAD: NOT PRESENT
PAROXYSMAL SWEATS: NO SWEAT VISIBLE
AUDITORY DISTURBANCES: NOT PRESENT
PAROXYSMAL SWEATS: NO SWEAT VISIBLE
TREMOR: NO TREMOR
VISUAL DISTURBANCES: NOT PRESENT
VISUAL DISTURBANCES: NOT PRESENT

## 2024-12-07 ASSESSMENT — COGNITIVE AND FUNCTIONAL STATUS - GENERAL
TOILETING: A LITTLE
CLIMB 3 TO 5 STEPS WITH RAILING: A LITTLE
DAILY ACTIVITIY SCORE: 23
WALKING IN HOSPITAL ROOM: A LITTLE
STANDING UP FROM CHAIR USING ARMS: A LITTLE
WALKING IN HOSPITAL ROOM: A LITTLE
DAILY ACTIVITIY SCORE: 24
CLIMB 3 TO 5 STEPS WITH RAILING: A LITTLE

## 2024-12-07 ASSESSMENT — PAIN DESCRIPTION - ORIENTATION: ORIENTATION: RIGHT

## 2024-12-07 ASSESSMENT — PAIN SCALES - GENERAL
PAINLEVEL_OUTOF10: 0 - NO PAIN
PAINLEVEL_OUTOF10: 0 - NO PAIN
PAINLEVEL_OUTOF10: 8
PAINLEVEL_OUTOF10: 8
PAINLEVEL_OUTOF10: 3
PAINLEVEL_OUTOF10: 3
PAINLEVEL_OUTOF10: 8
PAINLEVEL_OUTOF10: 3
PAINLEVEL_OUTOF10: 8
PAINLEVEL_OUTOF10: 8
PAINLEVEL_OUTOF10: 7
PAINLEVEL_OUTOF10: 2
PAINLEVEL_OUTOF10: 8
PAINLEVEL_OUTOF10: 0 - NO PAIN

## 2024-12-07 ASSESSMENT — PAIN DESCRIPTION - DESCRIPTORS
DESCRIPTORS: ACHING
DESCRIPTORS: THROBBING
DESCRIPTORS: THROBBING
DESCRIPTORS: ACHING
DESCRIPTORS: ACHING

## 2024-12-07 ASSESSMENT — PAIN - FUNCTIONAL ASSESSMENT
PAIN_FUNCTIONAL_ASSESSMENT: 0-10

## 2024-12-07 ASSESSMENT — PAIN DESCRIPTION - LOCATION: LOCATION: GROIN

## 2024-12-07 ASSESSMENT — ACTIVITIES OF DAILY LIVING (ADL): LACK_OF_TRANSPORTATION: YES

## 2024-12-07 NOTE — PROGRESS NOTES
Dorinda Benson is a 69 y.o. female on day 1 of admission presenting with Chest pain.      Subjective     Patient is seen resting in bed this afternoon.  Not in acute distress.  Denies chest pain, shortness of breath.    Hospital Course:  Ms. Benson is a 68 yo F with a PMH of HTN, prediabetes (A1c 5.9), HLD and substance use presented to the ED with chest pain c/f NSTEMI, s/p PCI with drug eluting stent placement ot he RCA.     Ms. Benson presented with left sided chest pain and heaviness which started the evening of 12/5. She had never experienced this type of pain before. The pain initially improved, however she woke the next morning feeling diaphoretic with left sided heaviness. She the presented to the ED.    In the ED, her ECG was without ST changes and her initial troponin was 120. She was initially admitted for observation. Her troponin peaked at 1,226. She underwent coronary CTA which revealed evidence of complete occlusion of the mid RCA with distal reconstitution. TTE notable for EF 73%, slightly elevated R ventricular systolic pressure and mildly dilated IVC.     She was loaded with aspirin 325, started on heparin gtt and received sublingual nitroglycerin, then placed on nitroglycerin gtt. She continued to experience chest pain and was ultimately taken to the cath lab where she underwent PCI. Coronary angio was notable for non obstructive CAD in the left system, RCA: 100% mid RCA occlusion s/p PCI with JIM 3.0x22 OLGA LIDIA JIM and post dilated mid and prox stent with a 3.5 NC balloon. Received 2 integrillin boluses and loaded with brilinta 180 mg in the lab at 6:30 pm. She was transferred to the CICU where she remained hemodynamically stable. She did have bleeding from her right groin access site which resolved with pressure and epi/lido. She was transferred to the floor on 12/7 for further management.        Objective     Last Recorded Vitals  /67   Pulse 66   Temp 36.4 °C (97.5 °F)   Resp 16   Wt 72.9  kg (160 lb 11.5 oz)   SpO2 100%   Intake/Output last 3 Shifts:    Intake/Output Summary (Last 24 hours) at 12/7/2024 1242  Last data filed at 12/7/2024 1000  Gross per 24 hour   Intake 373.65 ml   Output 860 ml   Net -486.35 ml       Admission Weight  Weight: 77.1 kg (170 lb) (12/06/24 0735)    Daily Weight  12/07/24 : 72.9 kg (160 lb 11.5 oz)    Image Results    ECG 12 lead  Sinus bradycardia  Otherwise normal ECG  When compared with ECG of 06-DEC-2024 07:50,  Nonspecific T wave abnormality no longer evident in Anterior leads  Nonspecific T wave abnormality, worse in Lateral leads    See ED provider note for full interpretation and clinical correlation  Confirmed by Emi Sauer ([id number]) on 12/6/2024 8:38:15 PM    Transthoracic Echo (TTE) Complete     CONCLUSIONS:   1. The left ventricular systolic function is normal, with a Waters's biplane calculated ejection fraction of 73%.   2. There is normal right ventricular global systolic function.   3. Slightly elevated right ventricular systolic pressure.   4. The inferior vena cava appears mildly dilated, with IVC inspiratory collapse greater than 50%.   5. There is no evidence of a patent foramen ovale.    CT angio coronary art with heartflow if score >30%    CT ANGIO CORONARY ART WITH HEARTFLOW IF SCORE >30%; 12/6/2024 2:32 pm    Impression: Limited scan because of high image noise. Within this limitation:  1. STENOSIS: Complete occlusion of the right with distal  reconstitution. Mild nonobstructive atherosclerosis at the level of  left main coronary artery trifurcation (1-24% stenosis).  2. PLAQUE BURDEN:There is extensive noncalcified plaque within the  right coronary artery as described. Otherwise, there is a small focus  of calcified atherosclerotic disease within the distal aspect of the  left main coronary artery.      CT angio chest abdomen pelvis 12/6  Impression: VASCULAR:  1. No thoracic or abdominal aortic aneurysm or acute aortic pathology.      CHEST  ABDOMEN PELVIS:  1. No acute abnormality of the chest, abdomen or pelvis.  2. Additional incidental non-acute findings as detailed above.      XR chest 2 views  XR CHEST 2 VIEWS;  12/6/2024 9:12 am  Impression: 1.  No evidence of acute cardiopulmonary process.      Physical Exam:  General: Patient is awake, alert, conversant. Not acutely distressed.   HEENT: Pupils are equal and round  Chest: Breathing appears comfortable on room air. Chest movement symmetric. Normal respiratory effort. No adventitious lung sounds on auscultation.   Cardiac: Normal rate, regular rhythm. Normal S1, S2. No murmur appreciated. Radial pulses 2+, pedal pulses 1+. Right groin site is covered by dressing; area is soft to palpation, slight tenderness. Dressing is clean, dry, intact.   Volume: Mucous membranes moist. No lower extremity edema.   Abdomen: Bowel sounds are active. Abdomen is soft and non tender to palpation.   EXT: Upper and lower extremities are atraumatic in appearance   MSK: Grossly full active ROM  Neuro: The patient is awake, alert, interactive. Sensation to light touch is intact bilaterally.  Psych: Appropriate mood and affect. Appropriate judgement and insight.     Relevant Results    Results from last 7 days   Lab Units 12/07/24  0607 12/06/24 2108 12/06/24  0754   WBC AUTO x10*3/uL 7.3 6.4 6.7   HEMOGLOBIN g/dL 10.9* 10.8* 12.7   HEMATOCRIT % 31.1* 32.2* 37.2   PLATELETS AUTO x10*3/uL 292 316 355       Results from last 7 days   Lab Units 12/07/24  0607 12/06/24  2108 12/06/24  0754   SODIUM mmol/L 138 137 142   POTASSIUM mmol/L 4.9 3.5 3.7   CHLORIDE mmol/L 102 101 103   CO2 mmol/L 28 24 27   BUN mg/dL 10 9 13   CREATININE mg/dL 0.71 0.62 0.70   GLUCOSE mg/dL 105* 111* 106*   CALCIUM mg/dL 9.3 9.3 10.1       Results from last 7 days   Lab Units 12/06/24  2108 12/06/24  1538   APTT seconds  --  29   INR  1.1  --               Assessment/Plan      Ms. Benson is a 70 yo F with a PMH of HTN, prediabetes (A1c 5.8), HLD and  substance use presented to the ED with chest pain c/f NSTEMI. LHC on 12/6 notable for complete occlusion of the mid RCA with distal reconstitution, s/p PCI to RCA.        #CAD   #NSTEMI s/p PCI to RCA   #R groin access site hematoma   - Troponin peak at 1226   - TTE notable for EF 73%, slightly elevated R ventricular systolic pressure and mildly dilated IVC  - LHC notable for 100% RCA occlusion s/p 3.0x22 OLGA LIDIA JIM and post dilated mid and prox stent with a 3.5 NC balloon  - Received two integrillin boluses and brilinta 180 mg in the cath lab, s/p ASA load and heparin gtt   - Post-procedure course complicated by right groin access site hematoma; s/p epi/lido. CBC stable. No further evidence of hematoma formation on physical exam.   - No further episode of chest pain.     Plan:  - Continue Brilinta 90 mg PO BID  - Continue ASA 81 mg PO daily   - Continue Atorvastatin - increased to high intensity dosage, 80 mg PO daily       #HTN  - Continue amlodipine 5 mg      #Prediabetes    - A1c 5.8 on 12/6  - Blood sugars 106-111 without intervention  - Consider initiation of Metformin in outpatient setting        F: PRN   E: K>4, Mg>2   N: Cardiac diet   A: PIV     Oxygen: none   Abx: none   Gtts: none      DVT ppx: none   GI ppx: PPI     Code Status: Full code post procedure, pt reports otherwise she is a DNR (confirmed on admission)  Surrogate Medical Decision-maker:  Sophie Hernandez (niece) [phone]        Latanya Avendano MD

## 2024-12-07 NOTE — HOSPITAL COURSE
Ms. Benson is a 70 yo F with a PMH of HTN, prediabetes (A1c 5.9), HLD and substance use presented to the ED with chest pain c/f NSTEMI, s/p PCI with drug eluting stent placement ot he RCA.     Ms. Benson presented with left sided chest pain and heaviness which started the evening of 12/5. She had never experienced this type of pain before. The pain initially improved, however she woke the next morning feeling diaphoretic with left sided heaviness. She the presented to the ED.    In the ED, her ECG was without ST changes and her initial troponin was 120. She was initially admitted for observation. Her troponin peaked at 1,226. She underwent coronary CTA which revealed evidence of complete occlusion of the mid RCA with distal reconstitution. TTE notable for EF 73%, slightly elevated R ventricular systolic pressure and mildly dilated IVC.     She was loaded with aspirin 325, started on heparin gtt and received sublingual nitroglycerin, then placed on nitroglycerin gtt. She continued to experience chest pain and was ultimately taken to the cath lab where she underwent PCI. Coronary angio was notable for non obstructive CAD in the left system, RCA: 100% mid RCA occlusion s/p PCI with JIM 3.0x22 OLGA LIDIA JIM and post dilated mid and prox stent with a 3.5 NC balloon. Received 2 integrillin boluses and loaded with brilinta 180 mg in the lab at 6:30 pm. She was transferred to the CICU where she remained hemodynamically stable. She did have bleeding from her right groin access site which resolved with pressure and epi/lido.     She was transferred to the floor on 12/7 for further management where she remained hemodynamically stable without further bleeding from her access site. She was discharged on 12/8 with her newly prescribed medications in hand (Brilinta, Aspirin, Atorvastatin).     She was instructed to follow up with Cardiology (referral placed). A referral was also placed to Primary Care and to Cardiac Rehab.

## 2024-12-07 NOTE — H&P
CARDIAC ICU HISTORY AND PHYSICAL   Chief Complaint: chest pain     HPI  68 yo F with a PMH of HTN, prediabetes (A1c 5.9), HLD and substance use presented to the ED with chest pain c/f NSTEMI. S/p PCI and transferred to the CICU for observation.     She presented with left sided chest pain and heaviness which started yesterday evening. Initially, it improved then overnight she woke up feeling diaphoretic with left sided heaviness and came into the ED. Never experienced pain like this before. Initial trop was 120, ECG without ST changes. She first admitted for observation and trop peaked to 1,226. Coronary CTA revealed evidence of complete occlusion of the mid RCA with distal reconstitution. TTE notable for EF 73%, slightly elevated R ventricular systolic pressure and mildly dilated IVC.  Loaded with aspirin 325, started on heparin gtt and received Sl nitroglycerin, then placed on nitroglycerin gtt. Continued to have chest pain and ultimately taken to the Cath lab. Underwent PCI in the Cath. Coronary angio notable for non obstructive CAD in the left system, RCA: 100% mid RCA occlusion s/p PCI with JIM 3.0x22 OLGA LIDIA JIM and post dilated mid and prox stent with a 3.5 NC balloon. Received 2 integrillin boluses and loaded with brilinta 180 mg in the lab at 6:30 pm.     On arrival to the CICU, patient HDS. Having bleeding from R groin access site with presence of a hematoma and pressure was held. Patient was given fentanyl for pain and epi/lido. Interventional fellow made aware present at bedside.     - Most Recent Vitals:  HR 65, /82,  RR 17, SpO2 100 on RA  - Labs:  CBC: WBC  6.7, Hgb 12.7, plt 355  BMP: Na 142, K 3.7, Cl 103, HCO3 27, BUN 13, Cr 0.70, glu 142  LFT: Ca 10.1, tprot 7.5, alb 4.5, alkphos 87, AST 17, ALT 16, tbili 0.3  Electrolytes: Mg 2.10  hs-TnI 120 -> 1226  UDS positive for marijuana     - Imaging:  CTA coronary 12/6/24   IMPRESSION:  Limited scan because of high image noise. Within this  limitation:  1. STENOSIS: Complete occlusion of the right with distal  reconstitution. Mild nonobstructive atherosclerosis at the level of  left main coronary artery trifurcation (1-24% stenosis).  2. PLAQUE BURDEN:There is extensive noncalcified plaque within the  right coronary artery as described. Otherwise, there is a small focus  of calcified atherosclerotic disease within the distal aspect of the  left main coronary artery.    CTA angio C/A/P 12/6/24   VASCULAR:   1. No thoracic or abdominal aortic aneurysm or acute aortic pathology.       CHEST ABDOMEN PELVIS:   1. No acute abnormality of the chest, abdomen or pelvis.   2. Additional incidental non-acute findings as detailed above.     TTE 12/6/24   CONCLUSIONS:   1. The left ventricular systolic function is normal, with a Waters's biplane calculated ejection fraction of 73%.   2. There is normal right ventricular global systolic function.   3. Slightly elevated right ventricular systolic pressure.   4. The inferior vena cava appears mildly dilated, with IVC inspiratory collapse greater than 50%.   5. There is no evidence of a patent foramen ovale.    CXR:Negative     PMH: per above    SurgHx:   Past Surgical History:   Procedure Laterality Date    HYSTERECTOMY        Allergies:   Allergies   Allergen Reactions    Aspirin Other     Per provider, patient gets upset stomach when taking aspirin    Latex Hives and Itching      SocHx: smokes 1 pack over 3-4 days , drinks 4 beers night/4 shots of vodka per day, smokes marijuana   FamHx:  Family History   Problem Relation Name Age of Onset    Dementia Mother      Alcohol abuse Brother      Other (bowel obstruction) Brother      Alcohol abuse Other          Home Medications:  No current facility-administered medications on file prior to encounter.     Current Outpatient Medications on File Prior to Encounter   Medication Sig Dispense Refill    amLODIPine (Norvasc) 5 mg tablet TAKE 1 TABLET BY MOUTH ONCE DAILY 30  tablet 11    diphenhydramine HCl (BENADRYL ALLERGY ORAL) Take 1 tablet by mouth as needed at bedtime.      losartan (Cozaar) 100 mg tablet Take 1 tablet (100 mg) by mouth once daily. 30 tablet 11    [DISCONTINUED] albuterol 90 mcg/actuation inhaler INHALE 2 PUFFS BY MOUTH AND INTO THE LUNGS EVERY 4-6 HOURS AS NEEDED 110.5 g 1    [DISCONTINUED] albuterol 90 mcg/actuation inhaler Inhale 2 puffs every 6 hours if needed for wheezing.      [DISCONTINUED] atorvastatin (Lipitor) 40 mg tablet TAKE 1 TABLET BY MOUTH AT BEDTIME (Patient not taking: Reported on 2024)      [DISCONTINUED] cetirizine (ZyrTEC) 10 mg tablet Take 1 tablet every day by oral route in the morning.      [DISCONTINUED] cholecalciferol (Vitamin D-3) 50,000 unit capsule TAKE 1 CAPSULE BY MOUTH ONCE WEEKLY 12 capsule 0    [DISCONTINUED] fluocinonide (Lidex) 0.05 % external solution Apply topically 2 times a day as needed for irritation or rash. 60 mL 2    [DISCONTINUED] fluticasone (Flonase) 50 mcg/actuation nasal spray Administer 2 sprays into each nostril once daily. 48 g 3        Objective:    24 Hour Vitals  Temp:  [36.7 °C (98.1 °F)] 36.7 °C (98.1 °F)  Heart Rate:  [55-74] 65  Resp:  [16-17] 17  BP: (119-159)/(66-96) 144/82    Temp (24hrs), Av.7 °C (98.1 °F), Min:36.7 °C (98.1 °F), Max:36.7 °C (98.1 °F)     24 hour Intake/Output    Intake/Output Summary (Last 24 hours) at 2024  Last data filed at 2024 1910  Gross per 24 hour   Intake 1000 ml   Output 10 ml   Net 990 ml        Exam:  General: well appearing  HEENT: NCAT, MMM  CV: RRR  PULM: CTAB, non-labored respirations   ABD: soft, NT, ND, + bowel sounds    EXT: R groin hematoma and bleeding from access site   SKIN: no rashes noted   NEURO: A&Ox4, no gross motor or sensory deficits  PSYCH: appropriate mood, appropriate affect      Labs:  CBC  Results from last 72 hours   Lab Units 24  0754   WBC AUTO x10*3/uL 6.7   HEMOGLOBIN g/dL 12.7   HEMATOCRIT % 37.2   PLATELETS AUTO  x10*3/uL 355        BMP  Results from last 72 hours   Lab Units 12/06/24  0754   SODIUM mmol/L 142   POTASSIUM mmol/L 3.7   CHLORIDE mmol/L 103   BUN mg/dL 13   CREATININE mg/dL 0.70   MAGNESIUM mg/dL 2.10       Imaging:  [unfilled]    Medications   Scheduled Medications  [Transfer Hold] amLODIPine, 5 mg, oral, Daily  [Transfer Hold] aspirin, 81 mg, oral, Daily  [Transfer Hold] atorvastatin, 40 mg, oral, Nightly  [Transfer Hold] folic acid, 1 mg, oral, Daily  [Transfer Hold] lidocaine, 1 patch, transdermal, Daily  [Transfer Hold] losartan, 100 mg, oral, Daily  [Transfer Hold] metoprolol, 5 mg, intravenous, Once  [Transfer Hold] metoprolol tartrate, 100 mg, oral, Once  multivitamin with minerals, 1 tablet, oral, Daily  nitroprusside in 0.9 % NaCl, , ,   [Transfer Hold] pantoprazole, 40 mg, oral, Daily before breakfast  [Transfer Hold] perflutren protein A microsphere, 0.5 mL, intravenous, Once in imaging  [Transfer Hold] sennosides-docusate sodium, 2 tablet, oral, BID  [Transfer Hold] sulfur hexafluoride microsphr, 2 mL, intravenous, Once in imaging  [Transfer Hold] thiamine, 100 mg, oral, Daily  [Transfer Hold] thiamine, 100 mg, intravenous, Daily     Continuous Medications  eptifibatide, , Last Rate: 13,878 mcg (12/06/24 1852)  heparin, 0-4,000 Units/hr, Last Rate: Stopped (12/06/24 1808)  nitroglycerin, 5-200 mcg/min, Last Rate: 20 mcg/min (12/06/24 1749)  nitroprusside, , Last Rate: 50 mcg (12/06/24 1859)  sodium chloride, , Last Rate: 250 mL (12/06/24 1900)  sodium chloride 0.9%, , Last Rate: 100 mL/hr (12/06/24 1827)     PRN Medications  PRN medications: eptifibatide, fentaNYL PF, heparin, [Transfer Hold] heparin, iohexol, lidocaine, lidocaine-epinephrine, [Transfer Hold] LORazepam, [Transfer Hold] LORazepam **OR** [Transfer Hold] LORazepam **OR** [Transfer Hold] LORazepam, [Transfer Hold] metoprolol, [Transfer Hold] metoprolol, [Transfer Hold] metoprolol, [Transfer Hold] metoprolol, [Transfer Hold] metoprolol  tartrate, midazolam, nitroprusside, nitroprusside in 0.9 % NaCl, [Transfer Hold] ondansetron **OR** [Transfer Hold] ondansetron, sodium chloride, sodium chloride 0.9%, ticagrelor       Assessment/Plan:  68 yo F with a PMH of HTN, prediabetes (A1c 5.9), HLD and substance use presented to the ED with chest pain c/f NSTEMI. S/p PCI to RCA and transferred to the CICU for observation.     NEUROLOGIC  -No active events     CARDIOVASCULAR  #CAD   #NSTEMI s/p PCI to RCA   ::trop peak 1226   ::TTE notable for EF 73%, slightly elevated R ventricular systolic pressure and mildly dilated IVC  ::LHC notable for 100% RCA occlusion s/p 3.0x22 OLGA LIDIA JIM and post dilated mid and prox stent with a 3.5 NC balloon  ::received two integrillin boluses and brilinta 180 mg in the cath lab, s/p ASA load and heparin gtt   -Plan to give next dose of Brilinta 90 mg at 6am   -C/w ASA 81 mg daily   -C/w Atorvastatin 40 mg  -Dc nitroglycerin gtt     #R groin access site hematoma   -s/p epi/lido   -montior   -fu CBC     #HTN  -C/w amlodipine 5 mg     PULMONARY  -No active events     RENAL/  -No active events     GASTROINTESTINAL  -No active events     ENDOCRINE  #Prediabetes    ::A1c 5.9  -Check A1c, lipid panel     HEME/ONC  -No active events     INFECTIOUS DISEASE  -No active events     MSK/DERM  #R groin access site hematoma   -s/p epi/lido   -montior   -fu CBC     F: PRN   E: K>4, Mg>2   N: NPO   A: PIV    Oxygen: none   Abx: none   Gtts: none     DVT ppx: none   GI ppx: PPI     Code Status: Full code post procedure, pt reports otherwise she is a DNR (confirmed on admission)  Surrogate Medical Decision-maker:  Sophie Hernandez (niece) [phone]

## 2024-12-07 NOTE — POST-PROCEDURE NOTE
Physician Transition of Care Summary  Invasive Cardiovascular Lab    Procedure Date: 12/6/2024  Attending:    * Carli Calvert - Primary  Resident/Fellow/Other Assistant: Surgeons and Role:     * Maulik Rodriguez MD - Fellow    Indications:   Pre-op Diagnosis      * Non-ST elevation (NSTEMI) myocardial infarction (Multi) [I21.4]    Post-procedure diagnosis:   Post-op Diagnosis     * Non-ST elevation (NSTEMI) myocardial infarction (Multi) [I21.4]    Procedure(s):   Left Heart Cath with Coronary Angiography and LV  20202 - [place] CATH PLMT L HRT & ARTS W/NJX & ANGIO IMG S&I        Procedure Findings and Description of the Procedure:  Coronary angiogram and percutaneous coronary intervention.   No evidence of obstructive coronary artery disease in the left system.     RCA: 100% mid RCA occlusion s/p PCI with TYSON 3.0x22 OLGA LIDIA TYOSN and post dilated mid and prox stent with a 3.5 NC balloon.   WATSON III flow at end of procedure.     2 integrillin boluses given in the lab.   Loaded with aspirin 325 in the ED   Loaded with brilinta 180 mg in the lab at 6:30 pm.     Please give next dose of brilinta 90 mg at 6 am tomorrow.     Description of the Procedure:   Access: Right common femoral artery, 7 Fr  Closure: successful Perclose     Complications:   None    Stents/Implants:   Implants       Stent    Stent, [place] Tyson, 3.00 X 22rx - [id number] - Implanted        Inventory item: STENT, OLGA LIDIA FRONTIER TYSON, 3.00 X 22RX Model/Cat number: [id number]    : [employer] Lot number: [id number]    Device identifier: [id number]        GUDID Information       Request status Successful        Brand name: [place]™ Version/Model: [id number]    Company name: [employer] MRI safety info as of 12/6/24: MR Conditional    Contains dry or latex rubber: No      GMDN P.T. name: Drug-eluting coronary artery stent, non-bioabsorbable-polymer-coated                As of 12/6/2024       Status: Implanted                               Anticoagulation/Antiplatelet Plan:   As noted above       Estimated Blood Loss:   10 mL    Anesthesia: Moderate Sedation Anesthesia Staff: No anesthesia staff entered.    Any Specimen(s) Removed:   No specimens collected during this procedure.    Disposition:   CICU 1      Electronically signed by: Sebas Nelson MD, 12/6/2024 7:17 PM

## 2024-12-07 NOTE — CARE PLAN
Problem: Pain - Adult  Goal: Verbalizes/displays adequate comfort level or baseline comfort level  Outcome: Progressing     Problem: Safety - Adult  Goal: Free from fall injury  Outcome: Progressing     Problem: Chronic Conditions and Co-morbidities  Goal: Patient's chronic conditions and co-morbidity symptoms are monitored and maintained or improved  Outcome: Progressing     Problem: Skin  Goal: Decreased wound size/increased tissue granulation at next dressing change  Outcome: Progressing  Flowsheets (Taken 12/7/2024 0949)  Decreased wound size/increased tissue granulation at next dressing change: Promote sleep for wound healing  Goal: Participates in plan/prevention/treatment measures  Outcome: Progressing  Flowsheets (Taken 12/7/2024 0949)  Participates in plan/prevention/treatment measures: Elevate heels  Goal: Prevent/manage excess moisture  Outcome: Progressing  Flowsheets (Taken 12/7/2024 0949)  Prevent/manage excess moisture: Cleanse incontinence/protect with barrier cream  Goal: Prevent/minimize sheer/friction injuries  Outcome: Progressing  Flowsheets (Taken 12/7/2024 0949)  Prevent/minimize sheer/friction injuries: Increase activity/out of bed for meals  Goal: Promote/optimize nutrition  Outcome: Progressing  Flowsheets (Taken 12/7/2024 0949)  Promote/optimize nutrition: Monitor/record intake including meals  Goal: Promote skin healing  Outcome: Progressing  Flowsheets (Taken 12/7/2024 0949)  Promote skin healing: Turn/reposition every 2 hours/use positioning/transfer devices

## 2024-12-07 NOTE — CARE PLAN
Problem: Pain - Adult  Goal: Verbalizes/displays adequate comfort level or baseline comfort level  Outcome: Progressing  Flowsheets (Taken 12/7/2024 0040)  Verbalizes/displays adequate comfort level or baseline comfort level:   Encourage patient to monitor pain and request assistance   Assess pain using appropriate pain scale   Administer analgesics based on type and severity of pain and evaluate response   Consider cultural and social influences on pain and pain management   Implement non-pharmacological measures as appropriate and evaluate response   Notify Licensed Independent Practitioner if interventions unsuccessful or patient reports new pain     Problem: Safety - Adult  Goal: Free from fall injury  Outcome: Progressing  Flowsheets (Taken 12/7/2024 0040)  Free from fall injury: Based on caregiver fall risk screen, instruct family/caregiver to ask for assistance with transferring infant if caregiver noted to have fall risk factors     Problem: Chronic Conditions and Co-morbidities  Goal: Patient's chronic conditions and co-morbidity symptoms are monitored and maintained or improved  Flowsheets (Taken 12/7/2024 0040)  Care Plan - Patient's Chronic Conditions and Co-Morbidity Symptoms are Monitored and Maintained or Improved:   Monitor and assess patient's chronic conditions and comorbid symptoms for stability, deterioration, or improvement   Update acute care plan with appropriate goals if chronic or comorbid symptoms are exacerbated and prevent overall improvement and discharge   Collaborate with multidisciplinary team to address chronic and comorbid conditions and prevent exacerbation or deterioration   The patient's goals for the shift include patient will remain CP free throughout the shift.     The clinical goals for the shift include patient will remain HDS.

## 2024-12-07 NOTE — PROGRESS NOTES
ICU to [facility] Transfer Summary     I:  ICU Admission Reason & Brief ICU Course:    Ms. Benson is a 70 yo F with a PMH of HTN, prediabetes (A1c 5.9), HLD and substance use presented to the ED with chest pain c/f NSTEMI, s/p PCI with drug eluting stent placement ot he RCA.     Ms. Benson presented with left sided chest pain and heaviness which started the evening of 12/5. She had never experienced this type of pain before. The pain initially improved, however she woke the next morning feeling diaphoretic with left sided heaviness. She the presented to the ED.    In the ED, her ECG was without ST changes and her initial troponin was 120. She was initially admitted for observation. Her troponin peaked at 1,226. She underwent coronary CTA which revealed evidence of complete occlusion of the mid RCA with distal reconstitution. TTE notable for EF 73%, slightly elevated R ventricular systolic pressure and mildly dilated IVC.     She was loaded with aspirin 325, started on heparin gtt and received sublingual nitroglycerin, then placed on nitroglycerin gtt. She continued to experience chest pain and was ultimately taken to the cath lab where she underwent PCI. Coronary angio was notable for non obstructive CAD in the left system, RCA: 100% mid RCA occlusion s/p PCI with JIM 3.0x22 OLGA LIDIA JIM and post dilated mid and prox stent with a 3.5 NC balloon. Received 2 integrillin boluses and loaded with brilinta 180 mg in the lab at 6:30 pm. She was transferred to the CICU where she remained hemodynamically stable. She did have bleeding from her right groin access site which resolved with pressure and epi/lido. She was transferred to the floor on 12/7 for further management.         C: Code Status/DPOA Info/Goals of Care/ACP Note    Full Code  DPOA/Contact Number: Sophie Hernandez ([facility]) [phone]      U: Unprescribing & Pertinent High-Risk Medications    Changes to home meds: None     Anticoagulation: No Reason for no VTE  prophylaxis:procedure not indicated    Antibiotics:   [x] N/A - no current planned antimicrobioals      P: Pending Tests at the Time of Transfer   None      A: Active consultants, including Rehab:   []  Subspecialty Consultants:     []  PT  []  OT  []  SLP  []  Wound Care    U: Uncertainty Measure/Diagnostic Pause:    Working diagnosis at the time of transfer NSTEMI, though ddx includes      Diagnosis Degree of Certainty: 1. High degree of certainty about the clinical diagnosis.     S: Summary of Major Problems and To-Dos:   NEUROLOGIC  -No active events      CARDIOVASCULAR  #CAD   #NSTEMI s/p PCI to RCA   ::trop peak 1226   ::TTE notable for EF 73%, slightly elevated R ventricular systolic pressure and mildly dilated IVC  ::LHC notable for 100% RCA occlusion s/p 3.0x22 OLGA LIDIA JIM and post dilated mid and prox stent with a 3.5 NC balloon  ::received two integrillin boluses and brilinta 180 mg in the cath lab, s/p ASA load and heparin gtt   -Plan to give next dose of Brilinta 90 mg at 6am   -C/w ASA 81 mg daily   -C/w Atorvastatin 40 mg  -Dc nitroglycerin gtt      #R groin access site hematoma   -s/p epi/lido   -montior   -fu CBC      #HTN  -C/w amlodipine 5 mg      PULMONARY  -No active events      RENAL/  -No active events      GASTROINTESTINAL  -No active events      ENDOCRINE  #Prediabetes    ::A1c 5.9  -Check A1c, lipid panel      HEME/ONC  -No active events      INFECTIOUS DISEASE  -No active events      MSK/DERM  #R groin access site hematoma   -s/p epi/lido   -montior   -fu CBC      F: PRN   E: K>4, Mg>2   N: NPO   A: PIV     Oxygen: none   Abx: none   Gtts: none      E: Exam, including Lines/Drains/Airways & Data Review:   General: Patient is awake, non-toxic appearing, normal body habitus  Pulm: Normal WOB at rest, no crackles or rhonchi  Cardiac: Regular rate and rhythm, normal S1/S2  Abdomen: Non-tender to palpation, non-distended  Extremities: No peripheral edema, access site not swollen with no  expressed blood  Neuro: Patient alert and oriented, cranial nerves grossly intact, normal strength and sensation.      Difficult airway? No  Lines/drains assessed for removal? No    Within 30 minutes of the patient physically leaving the floor, a Floor Readiness Note needs to be placed with updated vitals.

## 2024-12-08 ENCOUNTER — PHARMACY VISIT (OUTPATIENT)
Dept: PHARMACY | Facility: CLINIC | Age: 69
End: 2024-12-08
Payer: COMMERCIAL

## 2024-12-08 VITALS
DIASTOLIC BLOOD PRESSURE: 80 MMHG | RESPIRATION RATE: 17 BRPM | SYSTOLIC BLOOD PRESSURE: 123 MMHG | TEMPERATURE: 97 F | BODY MASS INDEX: 27.44 KG/M2 | HEIGHT: 64 IN | HEART RATE: 74 BPM | OXYGEN SATURATION: 97 % | WEIGHT: 160.72 LBS

## 2024-12-08 LAB
ALBUMIN SERPL BCP-MCNC: 3.5 G/DL (ref 3.4–5)
ANION GAP SERPL CALC-SCNC: 13 MMOL/L (ref 10–20)
BASOPHILS # BLD AUTO: 0.03 X10*3/UL (ref 0–0.1)
BASOPHILS NFR BLD AUTO: 0.6 %
BUN SERPL-MCNC: 9 MG/DL (ref 6–23)
CALCIUM SERPL-MCNC: 9.1 MG/DL (ref 8.6–10.6)
CHLORIDE SERPL-SCNC: 106 MMOL/L (ref 98–107)
CO2 SERPL-SCNC: 24 MMOL/L (ref 21–32)
CREAT SERPL-MCNC: 0.71 MG/DL (ref 0.5–1.05)
EGFRCR SERPLBLD CKD-EPI 2021: >90 ML/MIN/1.73M*2
EOSINOPHIL # BLD AUTO: 0.08 X10*3/UL (ref 0–0.7)
EOSINOPHIL NFR BLD AUTO: 1.6 %
ERYTHROCYTE [DISTWIDTH] IN BLOOD BY AUTOMATED COUNT: 12.8 % (ref 11.5–14.5)
GLUCOSE SERPL-MCNC: 86 MG/DL (ref 74–99)
HCT VFR BLD AUTO: 32.2 % (ref 36–46)
HGB BLD-MCNC: 10.3 G/DL (ref 12–16)
IMM GRANULOCYTES # BLD AUTO: 0.02 X10*3/UL (ref 0–0.7)
IMM GRANULOCYTES NFR BLD AUTO: 0.4 % (ref 0–0.9)
LYMPHOCYTES # BLD AUTO: 1.29 X10*3/UL (ref 1.2–4.8)
LYMPHOCYTES NFR BLD AUTO: 26.4 %
MAGNESIUM SERPL-MCNC: 2.02 MG/DL (ref 1.6–2.4)
MCH RBC QN AUTO: 32.4 PG (ref 26–34)
MCHC RBC AUTO-ENTMCNC: 32 G/DL (ref 32–36)
MCV RBC AUTO: 101 FL (ref 80–100)
MONOCYTES # BLD AUTO: 0.48 X10*3/UL (ref 0.1–1)
MONOCYTES NFR BLD AUTO: 9.8 %
NEUTROPHILS # BLD AUTO: 2.98 X10*3/UL (ref 1.2–7.7)
NEUTROPHILS NFR BLD AUTO: 61.2 %
NRBC BLD-RTO: 0 /100 WBCS (ref 0–0)
PHOSPHATE SERPL-MCNC: 4 MG/DL (ref 2.5–4.9)
PLATELET # BLD AUTO: 267 X10*3/UL (ref 150–450)
POTASSIUM SERPL-SCNC: 4 MMOL/L (ref 3.5–5.3)
RBC # BLD AUTO: 3.18 X10*6/UL (ref 4–5.2)
SODIUM SERPL-SCNC: 139 MMOL/L (ref 136–145)
WBC # BLD AUTO: 4.9 X10*3/UL (ref 4.4–11.3)

## 2024-12-08 PROCEDURE — 83735 ASSAY OF MAGNESIUM: CPT

## 2024-12-08 PROCEDURE — 36415 COLL VENOUS BLD VENIPUNCTURE: CPT

## 2024-12-08 PROCEDURE — RXMED WILLOW AMBULATORY MEDICATION CHARGE

## 2024-12-08 PROCEDURE — 2500000002 HC RX 250 W HCPCS SELF ADMINISTERED DRUGS (ALT 637 FOR MEDICARE OP, ALT 636 FOR OP/ED)

## 2024-12-08 PROCEDURE — 2500000001 HC RX 250 WO HCPCS SELF ADMINISTERED DRUGS (ALT 637 FOR MEDICARE OP): Performed by: NURSE PRACTITIONER

## 2024-12-08 PROCEDURE — 2500000001 HC RX 250 WO HCPCS SELF ADMINISTERED DRUGS (ALT 637 FOR MEDICARE OP)

## 2024-12-08 PROCEDURE — 85025 COMPLETE CBC W/AUTO DIFF WBC: CPT

## 2024-12-08 PROCEDURE — 99233 SBSQ HOSP IP/OBS HIGH 50: CPT | Performed by: INTERNAL MEDICINE

## 2024-12-08 PROCEDURE — 80069 RENAL FUNCTION PANEL: CPT

## 2024-12-08 RX ORDER — ASPIRIN 81 MG/1
81 TABLET ORAL DAILY
Qty: 30 TABLET | Refills: 11 | Status: SHIPPED | OUTPATIENT
Start: 2024-12-09 | End: 2025-12-09

## 2024-12-08 RX ORDER — ATORVASTATIN CALCIUM 80 MG/1
80 TABLET, FILM COATED ORAL NIGHTLY
Qty: 30 TABLET | Refills: 1 | Status: SHIPPED | OUTPATIENT
Start: 2024-12-08 | End: 2025-02-06

## 2024-12-08 ASSESSMENT — LIFESTYLE VARIABLES
TOTAL SCORE: 1
VISUAL DISTURBANCES: NOT PRESENT
TREMOR: NO TREMOR
ANXIETY: NO ANXIETY, AT EASE
AUDITORY DISTURBANCES: NOT PRESENT
TREMOR: NO TREMOR
ANXIETY: NO ANXIETY, AT EASE
TOTAL SCORE: 1
AGITATION: NORMAL ACTIVITY
ORIENTATION AND CLOUDING OF SENSORIUM: ORIENTED AND CAN DO SERIAL ADDITIONS
HEADACHE, FULLNESS IN HEAD: NOT PRESENT
NAUSEA AND VOMITING: NO NAUSEA AND NO VOMITING
PAROXYSMAL SWEATS: NO SWEAT VISIBLE
AUDITORY DISTURBANCES: NOT PRESENT
PAROXYSMAL SWEATS: BARELY PERCEPTIBLE SWEATING, PALMS MOIST
NAUSEA AND VOMITING: NO NAUSEA AND NO VOMITING
ORIENTATION AND CLOUDING OF SENSORIUM: ORIENTED AND CAN DO SERIAL ADDITIONS
TREMOR: NO TREMOR
VISUAL DISTURBANCES: NOT PRESENT
AGITATION: NORMAL ACTIVITY
AUDITORY DISTURBANCES: NOT PRESENT
VISUAL DISTURBANCES: NOT PRESENT
AGITATION: NORMAL ACTIVITY
NAUSEA AND VOMITING: NO NAUSEA AND NO VOMITING
HEADACHE, FULLNESS IN HEAD: NOT PRESENT
PAROXYSMAL SWEATS: BARELY PERCEPTIBLE SWEATING, PALMS MOIST
TOTAL SCORE: 0
HEADACHE, FULLNESS IN HEAD: NOT PRESENT
ANXIETY: NO ANXIETY, AT EASE
ORIENTATION AND CLOUDING OF SENSORIUM: ORIENTED AND CAN DO SERIAL ADDITIONS

## 2024-12-08 ASSESSMENT — COGNITIVE AND FUNCTIONAL STATUS - GENERAL
CLIMB 3 TO 5 STEPS WITH RAILING: A LITTLE
MOBILITY SCORE: 23
DAILY ACTIVITIY SCORE: 24

## 2024-12-08 ASSESSMENT — PAIN SCALES - GENERAL: PAINLEVEL_OUTOF10: 0 - NO PAIN

## 2024-12-08 NOTE — DISCHARGE INSTRUCTIONS
Ms. Benson,  You were admitted to the hospital with chest pain. You were found to have a blockage in one of the arteries that supplies blood to your heart. You underwent a catheterization procedure and a stent was placed to help open up that artery. You were prescribed Brilinta and Aspirin which are both very important for you to take every day as prescribed. These will help keep the stent in your heart open and working.     FOLLOW UP APPOINTMENTS:  - Please follow up with a Cardiologist. A referral order has been placed.   - We also placed an order for you to follow up with Cardiac Rehab.  - Please also follow up with a primary care provider within 1-2 weeks. A referral was placed for this as well.     Please call [phone] to schedule the above appointments.     It has been a pleasure taking care of you!    Your  Internal Medicine Team  [facility]  Department of Internal Medicine         CARDIAC CATHETERIZATION DISCHARGE INSTRUCTIONS     FOR SUDDEN AND SEVERE CHEST PAIN, SHORTNESS OF BREATH, EXCESSIVE BLEEDING, SIGNS OF STROKE, OR CHANGES IN MENTAL STATUS YOU SHOULD CALL 911 IMMEDIATELY.     Your provider has prescribed aspirin and ticagrelor (Brilinta), DO NOT STOP THESE MEDICATIONS for any reason without talking to your cardiologist first. If any of these were prescribed, you must take them every day without missing a single dose. If you are getting low on these medications, contact your provider immediately for a refill.     WOUND CARE   *FOR FEMORAL (LEG) ACCESS*  ·      Avoid heavy lifting (over 10 pounds) for 7 days, squatting or excessive bending for 2 days, and strenuous exercise for 7 days.  ·      No submerged bathing, swimming, or hot tubs for the next 7 days, or until fully healed.  ·      Avoid sexual activity for 3-4 days until any groin discomfort has ceased.    - The transparent dressing should be removed from the site 24 hours after the procedure.   Wash the site gently with soap and water. Rinse well and pat dry. Keep the area clean and dry. You may apply a Band-Aid to the site. Avoid lotions, ointments, or powders until fully healed.     - You may shower the day after your procedure.      - It is normal to notice a small bruise around the puncture site and/or a small grape sized or smaller lump. Any large bruising or large lump warrants a call to the office.     - If bleeding should occur, lay down and apply pressure to the affected area for 10 minutes.  If the bleeding stops notify your physician.  If there is a large amount of bleeding or spurting of blood CALL 911 immediately.  DO NOT drive yourself to the hospital.    - You may experience some tenderness, bruising or minimal inflammation.  If you have any concerns, you may contact the Cath Lab or if any of these symptoms become excessive, contact your cardiologist or go to the emergency room.     OTHER INSTRUCTIONS  - You may take acetaminophen (Tylenol) as directed for discomfort.  If pain is not relieved with acetaminophen (Tylenol), contact your doctor.    - If you notice or experience any of the following, you should notify your doctor or seek medical attention  Chest pain or discomfort  Change in mental status or weakness in extremities.  Dizziness, light headedness, or feeling faint.  Change in the site where the procedure was performed, such as bleeding or an increased area of bruising or swelling.  Tingling, numbness, pain, or coolness in the leg/arm beyond the site where the procedure was performed.  Signs of infection (i.e. shaking chills, temperature > 100 degrees Fahrenheit, warmth, redness) in the leg/arm area where the procedure was performed.  Changes in urination   Bloody or black stools  Vomiting blood  Severe nose bleeds  Any excessive bleeding    - If you DO NOT have an appointment with your cardiologist within 2-4 weeks following your procedure, please contact their office.

## 2024-12-08 NOTE — NURSING NOTE
Discharge instructions reviewed with patient. Updated on medications and changes in prescriptions. Patient receptive to instructions and wound care. Importance of follow up and cardiac rehab.   SAY Hilton RN

## 2024-12-08 NOTE — DISCHARGE SUMMARY
Discharge Diagnosis  Chest pain    Issues Requiring Follow-Up  Follow up in Cardiac Rehab (referral placed)  Follow up with an outpatient cardiologist (referral placed)  Pre-diabetes - consider initiation of Metformin    Discharge Meds     Medication List      START taking these medications     aspirin 81 mg EC tablet; Take 1 tablet (81 mg) by mouth once daily.;   Start taking on: December 9, 2024   atorvastatin 80 mg tablet; Commonly known as: Lipitor; Take 1 tablet (80   mg) by mouth once daily at bedtime.   ticagrelor 90 mg tablet; Commonly known as: Brilinta; Take 1 tablet (90   mg) by mouth 2 times a day.     CONTINUE taking these medications     amLODIPine 5 mg tablet; Commonly known as: Norvasc; TAKE 1 TABLET BY   MOUTH ONCE DAILY   BENADRYL ALLERGY ORAL   losartan 100 mg tablet; Commonly known as: Cozaar; Take 1 tablet (100   mg) by mouth once daily.       Test Results Pending At Discharge  Pending Labs       No current pending labs.            Hospital Course  Ms. Benson is a 70 yo F with a PMH of HTN, prediabetes (A1c 5.9), HLD and substance use presented to the ED with chest pain c/f NSTEMI, s/p PCI with drug eluting stent placement ot he RCA.     Ms. Benson presented with left sided chest pain and heaviness which started the evening of 12/5. She had never experienced this type of pain before. The pain initially improved, however she woke the next morning feeling diaphoretic with left sided heaviness. She the presented to the ED.    In the ED, her ECG was without ST changes and her initial troponin was 120. She was initially admitted for observation. Her troponin peaked at 1,226. She underwent coronary CTA which revealed evidence of complete occlusion of the mid RCA with distal reconstitution. TTE notable for EF 73%, slightly elevated R ventricular systolic pressure and mildly dilated IVC.     She was loaded with aspirin 325, started on heparin gtt and received sublingual nitroglycerin, then placed on  nitroglycerin gtt. She continued to experience chest pain and was ultimately taken to the cath lab where she underwent PCI. Coronary angio was notable for non obstructive CAD in the left system, RCA: 100% mid RCA occlusion s/p PCI with JIM 3.0x22 OLGA LIDIA JIM and post dilated mid and prox stent with a 3.5 NC balloon. Received 2 integrillin boluses and loaded with brilinta 180 mg in the lab at 6:30 pm. She was transferred to the CICU where she remained hemodynamically stable. She did have bleeding from her right groin access site which resolved with pressure and epi/lido.     She was transferred to the floor on 12/7 for further management where she remained hemodynamically stable without further bleeding from her access site. She was discharged on 12/8 with her newly prescribed medications in hand (Brilinta, Aspirin, Atorvastatin).     She was instructed to follow up with Cardiology (referral placed). A referral was also placed to Primary Care and to Cardiac Rehab.        Pertinent Physical Exam At Time of Discharge    General: Patient is awake, alert, conversant. Not acutely distressed.   Chest: Breathing appears comfortable on room air. Chest movement symmetric. Normal respiratory effort. No adventitious lung sounds on auscultation.   Cardiac: Normal rate, regular rhythm. Normal S1, S2. No murmur appreciated. Radial pulses 2+, pedal pulses 2+. Right groin site is covered by dressing; area is soft to palpation. Dressing is clean, dry, intact.   Volume: Mucous membranes moist. No lower extremity edema.   Abdomen: Bowel sounds are active. Abdomen is soft and non tender to palpation.   EXT: Upper and lower extremities are atraumatic in appearance   Neuro: The patient is awake, alert, interactive. Sensation to light touch is intact to lower extremities bilaterally.  Psych: Appropriate mood and affect. Appropriate judgement and insight.     Outpatient Follow-Up  Future Appointments   Date Time Provider Department Center    6/4/2025  2:15 PM Toño Rios, EM [id number]       Latanya Avendano MD

## 2024-12-08 NOTE — CARE PLAN
The patient's goals for the shift include HDS    The clinical goals for the shift include Pt will sleep a minimum of 4 hours by the end of this shift      Problem: Pain - Adult  Goal: Verbalizes/displays adequate comfort level or baseline comfort level  Outcome: Progressing     Problem: Safety - Adult  Goal: Free from fall injury  Outcome: Progressing

## 2024-12-08 NOTE — PROGRESS NOTES
Transitional Care Coordinator Progress Note:     Spoke with patient Dorinda Benson is a 69 y.o. female on day 2 of admission presenting with Chest pain. Pt reports her nephew will provide transport home. Pt reports no needs at this time. IMM signed and placed in the chart.     Alvin Rogers, RN, BSN, TCC

## 2024-12-08 NOTE — CARE PLAN
Problem: Safety - Adult  Goal: Free from fall injury  Outcome: Progressing     Problem: Pain - Adult  Goal: Verbalizes/displays adequate comfort level or baseline comfort level  Outcome: Progressing   The patient's goals for the shift include HDS    The clinical goals for the shift include patient will continue to ambulate while denying chest pain

## 2024-12-09 ENCOUNTER — TELEPHONE (OUTPATIENT)
Dept: CARDIAC REHAB | Facility: HOSPITAL | Age: 69
End: 2024-12-09
Payer: COMMERCIAL

## 2024-12-09 LAB
ATRIAL RATE: 54 BPM
P AXIS: 63 DEGREES
P OFFSET: 198 MS
P ONSET: 141 MS
PR INTERVAL: 152 MS
Q ONSET: 217 MS
QRS COUNT: 9 BEATS
QRS DURATION: 94 MS
QT INTERVAL: 456 MS
QTC CALCULATION(BAZETT): 432 MS
QTC FREDERICIA: 440 MS
R AXIS: 17 DEGREES
T AXIS: 27 DEGREES
T OFFSET: 445 MS
VENTRICULAR RATE: 54 BPM

## 2024-12-10 NOTE — PROGRESS NOTES
PATIENT: Dorinda Benson  DATE: 12/10/2024    Called patient regarding scheduling Cardiac Rehab at [facility].   Spoke to patient and scheduled initial intake appointment on 12/19/2024 at 3:00 pm.    VERN Chisholm

## 2024-12-16 ENCOUNTER — OFFICE VISIT (OUTPATIENT)
Dept: PRIMARY CARE | Facility: HOSPITAL | Age: 69
End: 2024-12-16
Payer: COMMERCIAL

## 2024-12-16 ENCOUNTER — DOCUMENTATION (OUTPATIENT)
Dept: PRIMARY CARE | Facility: HOSPITAL | Age: 69
End: 2024-12-16
Payer: COMMERCIAL

## 2024-12-16 VITALS
BODY MASS INDEX: 27.83 KG/M2 | TEMPERATURE: 97.4 F | SYSTOLIC BLOOD PRESSURE: 104 MMHG | WEIGHT: 163 LBS | HEART RATE: 66 BPM | OXYGEN SATURATION: 99 % | DIASTOLIC BLOOD PRESSURE: 67 MMHG | HEIGHT: 64 IN

## 2024-12-16 DIAGNOSIS — Z09 HOSPITAL DISCHARGE FOLLOW-UP: Primary | ICD-10-CM

## 2024-12-16 PROCEDURE — RXMED WILLOW AMBULATORY MEDICATION CHARGE

## 2024-12-16 RX ORDER — MULTIVITAMIN
1 TABLET ORAL DAILY
Qty: 30 TABLET | Refills: 11 | Status: SHIPPED | OUTPATIENT
Start: 2024-12-16 | End: 2025-12-11

## 2024-12-16 ASSESSMENT — ENCOUNTER SYMPTOMS
DEPRESSION: 0
LOSS OF SENSATION IN FEET: 0
OCCASIONAL FEELINGS OF UNSTEADINESS: 0

## 2024-12-16 ASSESSMENT — PATIENT HEALTH QUESTIONNAIRE - PHQ9
1. LITTLE INTEREST OR PLEASURE IN DOING THINGS: NOT AT ALL
SUM OF ALL RESPONSES TO PHQ9 QUESTIONS 1 AND 2: 0
2. FEELING DOWN, DEPRESSED OR HOPELESS: NOT AT ALL

## 2024-12-16 ASSESSMENT — COLUMBIA-SUICIDE SEVERITY RATING SCALE - C-SSRS
6. HAVE YOU EVER DONE ANYTHING, STARTED TO DO ANYTHING, OR PREPARED TO DO ANYTHING TO END YOUR LIFE?: NO
2. HAVE YOU ACTUALLY HAD ANY THOUGHTS OF KILLING YOURSELF?: NO
1. IN THE PAST MONTH, HAVE YOU WISHED YOU WERE DEAD OR WISHED YOU COULD GO TO SLEEP AND NOT WAKE UP?: NO

## 2024-12-16 ASSESSMENT — PAIN SCALES - GENERAL: PAINLEVEL_OUTOF10: 0-NO PAIN

## 2024-12-16 NOTE — PROGRESS NOTES
Chief complaint: ED follow up    HPI:  Dorinda Benson is a 69 y.o. female with pmh of HTN, prediabetes (A1c 5.9), HLD and substance use presenting for ED follow up. She was admitted from 12/6 to 12/8 for chest pain c/f NSTEMI, s/p PCI with drug eluting stent placement to the RCA. She was discharged on Brilinta, Aspirin, Atorvastatin. Patient has felt well since hospital discharge. Denies chest pain, shortness of breath, lightheadedness. Reports having a lot of energy some days and more tired others, but states this is not new since discharge. She has been compliant with her medications and is using a pill box to make sure she takes them daily. She has decreased her alcohol use from 4 beers and 4 vodka shots per day to 1 beer per day. Decreased tobacco use from 5-6 cigarettes per day to 1-1.5 cigarettes per day. She is also working on improving her diet.    Patient starts cardiac rehab on Thursday. Patient has follow up with cardiology on 1/7/25.    Medications:  Current Outpatient Medications   Medication Instructions    amLODIPine (Norvasc) 5 mg tablet TAKE 1 TABLET BY MOUTH ONCE DAILY    aspirin 81 mg, oral, Daily    atorvastatin (LIPITOR) 80 mg, oral, Nightly    Brilinta 90 mg, oral, 2 times daily    diphenhydramine HCl (BENADRYL ALLERGY ORAL) 1 tablet, Nightly PRN    losartan (COZAAR) 100 mg, oral, Daily    multivitamin tablet 1 tablet, oral, Daily       Allergies:  Allergies   Allergen Reactions    Aspirin Other     Per provider, patient gets upset stomach when taking aspirin    Latex Hives and Itching       Past medical history:  Past Medical History:   Diagnosis Date    Asthma     COPD (chronic obstructive pulmonary disease) (Multi)     HLD (hyperlipidemia)     Hypertension        Surgical history:  Past Surgical History:   Procedure Laterality Date    CARDIAC CATHETERIZATION N/A 12/6/2024    Procedure: Left Heart Cath with Coronary Angiography and LV;  Surgeon: Carli Calvert MD;  Location: [place] [address]  Cardiac Cath Lab;  Service: Cardiovascular;  Laterality: N/A;    HYSTERECTOMY         Family history:  Family History   Problem Relation Name Age of Onset    Dementia Mother      Alcohol abuse Brother      Other (bowel obstruction) Brother      Alcohol abuse Other         Social history:   reports that she has been smoking cigarettes. She has never used smokeless tobacco. She reports current alcohol use. She reports current drug use. Drug: Marijuana.    Health maintenance:  Health Maintenance   Topic Date Due    Medicare Annual Wellness Visit (AWV)  Never done    Hepatitis A Vaccines (1 of 2 - Risk 2-dose series) Never done    RSV High Risk: (Elderly (60+) or Pregnant Population) (1 - Risk 60-74 years 1-dose series) Never done    Influenza Vaccine (1) 09/01/2024    COVID-19 Vaccine (1 - 2024-25 season) Never done    Bone Density Scan  04/27/2025    Mammogram  05/29/2025    Diabetes: Hemoglobin A1C  12/06/2025    Diabetes Screening  12/06/2025    Lipid Panel  12/06/2029    DTaP/Tdap/Td Vaccines (2 - Td or Tdap) 01/24/2033    Colorectal Cancer Screening  02/22/2033    Pneumococcal Vaccine: 65+ Years  Completed    Zoster Vaccines  Completed    Hepatitis C Screening  Completed    HIB Vaccines  Aged Out    Hepatitis B Vaccines  Aged Out    IPV Vaccines  Aged Out    Meningococcal Vaccine  Aged Out    Rotavirus Vaccines  Aged Out    HPV Vaccines  Aged Out    Irritable Bowel Syndrome  Discontinued       Review of systems:  Review of Systems Negative unless stated in HPI    Vitals:  Vitals:    12/16/24 1455   BP: 104/67   Pulse: 66   Temp: 36.3 °C (97.4 °F)   SpO2: 99%       Physical exam:  Physical Exam  Constitutional:       General: She is not in acute distress.     Appearance: Normal appearance. She is not ill-appearing or diaphoretic.   HENT:      Head: Normocephalic and atraumatic.      Nose: Nose normal.      Mouth/Throat:      Mouth: Mucous membranes are moist.   Eyes:      Extraocular Movements: Extraocular  movements intact.   Cardiovascular:      Rate and Rhythm: Normal rate and regular rhythm.      Heart sounds: No murmur heard.     No friction rub. No gallop.   Pulmonary:      Effort: Pulmonary effort is normal. No respiratory distress.      Breath sounds: Normal breath sounds. No wheezing or rales.   Chest:      Chest wall: No tenderness.   Abdominal:      General: Abdomen is flat. Bowel sounds are normal. There is no distension.      Palpations: Abdomen is soft.      Tenderness: There is no abdominal tenderness.   Skin:     General: Skin is warm.      Findings: No rash.      Comments: Right groin access site healing well. No bleeding, erythema or drainage. Large ecchymosis on right inner thigh in healing stages   Neurological:      General: No focal deficit present.      Mental Status: She is alert.   Psychiatric:         Mood and Affect: Mood normal.         Behavior: Behavior normal.         Thought Content: Thought content normal.         Judgment: Judgment normal.         Labs:  Lab Results   Component Value Date    WBC 4.9 12/08/2024    HGB 10.3 (L) 12/08/2024    HCT 32.2 (L) 12/08/2024     (H) 12/08/2024     12/08/2024       Lab Results   Component Value Date    GLUCOSE 86 12/08/2024    CALCIUM 9.1 12/08/2024     12/08/2024    K 4.0 12/08/2024    CO2 24 12/08/2024     12/08/2024    BUN 9 12/08/2024    CREATININE 0.71 12/08/2024       Lab Results   Component Value Date    HGBA1C 5.8 (H) 12/06/2024        Lab Results   Component Value Date    CHOL 272 (H) 12/06/2024    CHOL 226 (H) 05/08/2024    CHOL 241 (H) 11/22/2023    CHOL 241 (H) 11/22/2023     Lab Results   Component Value Date    HDL 73.5 12/06/2024    HDL 65.4 05/08/2024    HDL 59.3 11/22/2023    HDL 60.2 11/22/2023     Lab Results   Component Value Date    LDLCALC 161 (H) 12/06/2024    LDLCALC 146 (H) 11/22/2023     Lab Results   Component Value Date    TRIG 190 (H) 12/06/2024    TRIG 174 (H) 11/22/2023     No components  "found for: \"CHOLHDL\"    Imaging:  ECG 12 lead    Result Date: 12/9/2024  Sinus bradycardia Otherwise normal ECG When compared with ECG of 06-DEC-2024 10:13, Nonspecific T wave abnormality no longer evident in Lateral leads    Cardiac Catheterization Procedure    Result Date: 12/8/2024   [facility], Cath Lab, [address] [place], [place] [id number] Cardiovascular Catheterization Report Patient Name:      MYAH NGUYEN        Performing Physician:  [id number] Angel Calvert MD Study Date:        12/6/2024           Verifying Physician:   Becky Calvert MD MRN/PID:           [id number]            Cardiologist/Co-Scrub: Accession#:        [id number]        Ordering Provider:     [id number] TRICE VELASQUEZ Date of Birth/Age: 1955 / 69 years Cardiologist: Gender:            F                   Fellow:                [id number] Maulik Rodriguez MD Admit Date:                            Fellow:                [id number] Sebas Nelson MD Encounter#:        [id number]          Surgeon:  Study: Left Heart Cath with PCI  Indications: MYAH NGUYEN is a 69 year old female who presents with dyslipidemia and hypertension. MYAH NGUYEN is a 69 year old female who presents with dyslipidemia, hypertension and a chest pain assessment of typical angina. NSTE - ACS. Coronary CT angiography reveals significant coronary stenosis. Acute MI. Medical History: Stress test performed: No. CTA performed: Yes. CTA result: Obstructive CAD. Agatston accessed: No. LVEF Assessed: Yes. LVEF = 55%. Cardiac arrest: No. Cardiac surgical consult: No. Cardiovascular instability: No. Frailty status of patient entering lab: 2 = Well.  Procedure " Description: After infiltration with 2% Lidocaine, the right femoral artery was cannulated with a modified Seldinger technique. Subsequently a 7 Occitan sheath was placed in the right femoral artery. After completion of the procedure, femoral artery angiography was performed. This demonstrated a common femoral artery puncture appropriate for closure. A Perclose ProGlide 6F (Abbott) vascular closure device was placed per protocol.  Coronary Angiography: The coronary circulation is co-dominant.  Left Main Coronary Artery: The left main coronary artery is a normal caliber vessel. The left main arises normally from the left coronary sinus of Valsalva and bifurcates into the LAD and circumflex coronary arteries. The left main coronary artery showed no significant disease or stenosis greater than 30%.  Left Anterior Descending Coronary Artery Distribution: The left anterior descending coronary artery is a normal caliber vessel. The LAD arises normally from the left main coronary artery. The LAD demonstrated no significant disease or stenosis greater than 30%. The 1st diagonal branch showed no significant disease or stenosis greater than 30%. The 2nd diagonal branch demonstrated no significant disease or stenosis greater than 30%.  Circumflex Coronary Artery Distribution: The circumflex coronary artery is a normal caliber vessel. The circumflex arises normally from the left main coronary artery and terminates in the AV groove. The circumflex revealed no significant disease or stenosis greater than 30%. The 1st obtuse marginal branch showed no significant disease or stenosis greater than 30%. The 2nd obtuse marginal branch demonstrated no significant disease or stenosis greater than 30%.  Right Coronary Artery Distribution: The right coronary artery is a normal caliber vessel. The RCA arises normally from the right sinus of Valsalva. The mid right coronary artery showed 100% stenosis. This lesion was thrombotic.  Coronary  Interventions: Angiography reveals a 100% stenosis of the mid right coronary artery coronary artery. Pre-intervention WATSON flow was 0. Percutaneous coronary intervention was performed within the mid right coronary artery. The vessel was pre-dilated using a compliant balloon 2.5 mm x 12 mm at 8 JORGE ALBERTO. OLGA LIDIA [place] 3.0 mm x 22 mm was advanced to the lesion and implanted at 10 JORGE ALBERTO. The stent was post dilated using a non-compliant balloon 3.5 mm x 12 mm at 11 JORGE ALBERTO. The stenosis was successfully reduced from 100% to 0%. Post-intervention WATSON flow was 3. RCA Intervention: The patient was a 70 y/o F who presented to the ED with left sided chest pain and elevated Troponin without clear ischemic ECG changes. A CT coronary showed complete occlusion of the mid RCA. She had been loaded with ASA and was started on a heparin gtt. Due to persistent CP in the setting of NSTEMI, the plan was made to pursue expedited cardiac catheterization. Ultrasound guided right CFA access was obtained using micropuncture technique and coronary angiogram confirmed occlusion of the mid RCA. Heparin was given and therapeutic ACTs were maintained for the entirety of the procedure. The patient was loaded with Ticagrelor. The RCA was engaged using a 6 Fr JR 4 guide catheter and a RunThrough wire was passed with ease into the distal RCA. A 2.5 x 12 Euphora SC was used to dotter the mid to distal RCA with subsequent angiogram showed a focal, thrombotic stenosis in the mid RCA. The balloon was dilated at this location at 8 jorge alberto. A 3.0 x 22 OLGA LIDIA [place] JIM was then deployed in the mid RCA at 10 jorge alberto and post-dilation was performed using a 3.5 x 12 Euphora NC at 11 jorge alberto. Intra-coronary nitroprusside was given to help with any microvascular distal embolization. Final angiograms showed 0% residual stenosis, absence of guide dissection or distal wire perforation, or any clear edge dissection. Equipment was removed and the femoral arteriotomy was closed using a  Pasquale.  Coronary Lesion Summary: Vessel   Stenosis    Vessel Segment RCA    100% stenosis      mid  Hemo Personnel: +-------------------+---------+ Name               Duty      +-------------------+---------+ Carli Calvert MD 1 +-------------------+---------+  Hemodynamic Pressures:  +----+--------------------+----------+-------------+--------------+---------+ Site     Date Time      Phase NameSystolic mmHgDiastolic mmHgMean mmHg +----+--------------------+----------+-------------+--------------+---------+   AO12/6/2024 6:30:48 PM      Rest          125            66       90 +----+--------------------+----------+-------------+--------------+---------+   AO12/6/2024 6:34:13 PM      Rest          130            59       89 +----+--------------------+----------+-------------+--------------+---------+  Complications: No in-lab complications observed.  Cardiac Cath Post Procedure Notes: Post Procedure Diagnosis: Successful RCA PCI. Blood Loss:               Estimated blood loss during the procedure was 20cc                           mls. Specimens Removed:        Number of specimen(s) removed: none.  Recommendations: Maximize medical therapy. Telemetry monitoring. Monitor vitals and arterial access site/pulses. Anti-platelet therapy with Aspirin and Ticagrelor 90mgs BID. Lipid lowering agent or Statin therapy. Consider referral to cardiac rehabilitation. Medical management of coronary artery disease. Aspirin therapy. Beta blocker therapy. ____________________________________________________________________________________ CONCLUSIONS:  1. LM: no obstructive CAD.  2. LAD: no obstructive CAD.  3. LCX: no obstructive CAD.  4. RCA: 100% mid RCA stenosis (culprit of NSTEMI) now s/p PCI with a 3.0 x 22 OLGA LIDIA [place] with post-dilation using a 3.5 NC.  5. ASA/Ticagrelor for 1 year followed by lifelong ASA. ICD 10 Codes: Non ST elevation (NSTEMI) myocardial infarction-I21.4  CPT Codes:  Ultrasound guidance for vascular access-85456; Left Heart Cath (visualization of coronaries) and LV-75934; Revasc during AMI stent/angio/atherc,ins asp Thrombectomy, Right Coronary single Vessel (PCI)-47190.RC; Moderate Sedation Services initial 15 minutes patient >5 years-11343; Angiography, Extremity,uni,S&I (PER)-65718  [id number] Carli Calvert MD Performing Physician Electronically signed by [id number] Carli Calvert MD on 12/8/2024 at 7:18:42 PM  ** Final **     ECG 12 lead    Result Date: 12/6/2024  Normal sinus rhythm Possible Left atrial enlargement Low voltage QRS Nonspecific T wave abnormality Abnormal ECG When compared with ECG of 03-FEB-2006 08:21, Non-specific change in ST segment in Anterior leads See ED provider note for full interpretation and clinical correlation Confirmed by Emi Sauer ([id number]) on 12/6/2024 8:38:23 PM    ECG 12 lead    Result Date: 12/6/2024  Sinus bradycardia Otherwise normal ECG When compared with ECG of 06-DEC-2024 07:50, Nonspecific T wave abnormality no longer evident in Anterior leads Nonspecific T wave abnormality, worse in Lateral leads See ED provider note for full interpretation and clinical correlation Confirmed by Emi Sauer ([id number]) on 12/6/2024 8:38:15 PM    Transthoracic Echo (TTE) Complete    Result Date: 12/6/2024   [facility], [address] [place] [address]                Tel [phone] and Fax [phone] TRANSTHORACIC ECHOCARDIOGRAM REPORT  Patient Name:       MYAH Mcmahon Physician:    [id number] Jorge Munoz MD Study Date:         12/6/2024           Ordering Provider:    [id number] SINGH MARTINES MRN/PID:            [id number]            Fellow: Accession#:         [id number]        Nurse:                Hallie Everett RN Date of Birth/Age:  1955 / 69 years Sonographer:          Taryn Loera                                                                Intern Gender assigned at  F                   Additional Staff:     Anika Nieto RDCS Birth: Height:             162.56 cm           Admit Date:           12/6/2024 Weight:             63.50 kg            Admission Status:     Inpatient -                                                               Priority                                                               discharge BSA / BMI:          1.68 m2 / 24.03     Encounter#:           [id number]                     kg/m2 Blood Pressure:     134/76 mmHg         Department Location:  [facility]                                                               Non Invasive Study Type:    TRANSTHORACIC ECHO (TTE) COMPLETE Diagnosis/ICD: Chest pain on breathing (pleuritic chest pain)-R07.1 Indication:    chest pain CPT Code:      Echo Complete w Full Doppler-14960 Patient History: Pertinent History: HTN,CP, HLD, asthma, ETOH. Study Detail: The following Echo studies were performed: 2D, M-Mode, Doppler and               color flow. Technically challenging study due to prominent lung               artifact. Definity used as a contrast agent for endocardial border               definition and agitated saline used as a contrast agent for               intraseptal flow evaluation. Total contrast used for this               procedure was 2 mL via IV push.  PHYSICIAN INTERPRETATION: Left Ventricle: The left ventricular systolic function is normal, with a Waters's biplane calculated ejection fraction of 73%. There are no regional left ventricular wall motion abnormalities. The left ventricular cavity size is normal. There is normal septal and normal posterior left ventricular wall thickness. Spectral Doppler shows a normal pattern of left ventricular diastolic filling. Left Atrium: The left atrium is normal in size. There is no evidence of a patent foramen ovale. A bubble study using agitated saline was performed. Bubble  study is negative. There is evidence of an atrial septal aneurysm. Right Ventricle: The right ventricle is normal in size. There is normal right ventricular global systolic function. Right Atrium: The right atrium is mildly dilated. Aortic Valve: The aortic valve is trileaflet. There is minimal aortic valve cusp calcification. There are increased aortic valve velocities due to increased flow/dynamic ejection. There is trace aortic valve regurgitation. The peak instantaneous gradient of the aortic valve is 13 mmHg. Mitral Valve: The mitral valve is normal in structure. There is trace mitral valve regurgitation. Tricuspid Valve: The tricuspid valve is structurally normal. There is trace to mild tricuspid regurgitation. The Doppler estimated RVSP is slightly elevated at 31.0 mmHg. Pulmonic Valve: The pulmonic valve is not well visualized. There is trace to mild pulmonic valve regurgitation. Pericardium: Trivial to small pericardial effusion. Aorta: The aortic root is normal. Systemic Veins: The inferior vena cava appears mildly dilated, with IVC inspiratory collapse greater than 50%. In comparison to the previous echocardiogram(s): There are no prior studies on this patient for comparison purposes.  CONCLUSIONS:  1. The left ventricular systolic function is normal, with a Waters's biplane calculated ejection fraction of 73%.  2. There is normal right ventricular global systolic function.  3. Slightly elevated right ventricular systolic pressure.  4. The inferior vena cava appears mildly dilated, with IVC inspiratory collapse greater than 50%.  5. There is no evidence of a patent foramen ovale. QUANTITATIVE DATA SUMMARY:  2D MEASUREMENTS:          Normal Ranges: Ao Root d:       3.10 cm  (2.0-3.7cm) LAs:             3.40 cm  (2.7-4.0cm) IVSd:            0.90 cm  (0.6-1.1cm) LVPWd:           0.90 cm  (0.6-1.1cm) LVIDd:           4.40 cm  (3.9-5.9cm) LVIDs:           2.30 cm LV Mass Index:   76 g/m2 LVEDV Index:     49  ml/m2 LV % FS          47.7 %  LA VOLUME:                    Normal Ranges: LA Vol A4C:        48.4 ml    (22+/-6mL/m2) LA Vol A2C:        45.6 ml LA Vol BP:         47.6 ml LA Vol Index A4C:  28.8ml/m2 LA Vol Index A2C:  27.1 ml/m2 LA Vol Index BP:   28.3 ml/m2 LA Area A4C:       18.8 cm2 LA Area A2C:       18.0 cm2 LA Major Axis A4C: 6.2 cm LA Major Axis A2C: 6.0 cm  RA VOLUME BY A/L METHOD:          Normal Ranges: RA Area A4C:             17.9 cm2  AORTA MEASUREMENTS:         Normal Ranges: Asc Ao, d:          3.05 cm (2.1-3.4cm)  LV SYSTOLIC FUNCTION BY 2D PLANIMETRY (MOD):                      Normal Ranges: EF-A4C View:    72 % (>=55%) EF-A2C View:    74 % EF-Biplane:     73 % LV EF Reported: 73 %  LV DIASTOLIC FUNCTION:             Normal Ranges: MV Peak E:             0.78 m/s    (0.7-1.2 m/s) MV Peak A:             1.05 m/s    (0.42-0.7 m/s) E/A Ratio:             0.74        (1.0-2.2) MV e'                  0.084 m/s   (>8.0) MV lateral e'          0.08 m/s MV medial e'           0.08 m/s MV A Dur:              120.00 msec E/e' Ratio:            9.25        (<8.0) PulmV Sys Terrence:         55.30 cm/s PulmV Lane Terrence:        39.00 cm/s PulmV S/D Terrence:         1.40 PulmV A Revs Terrence:      33.40 cm/s PulmV A Revs Dur:      127.00 msec  MITRAL VALVE:          Normal Ranges: MV DT:        217 msec (150-240msec)  AORTIC VALVE:            Normal Ranges: AoV Vmax:      1.77 m/s  (<=1.7m/s) AoV Peak P.5 mmHg (<20mmHg) LVOT Max Terrence:  1.55 m/s  (<=1.1m/s) LVOT VTI:      28.40 cm LVOT Diameter: 2.00 cm   (1.8-2.4cm) AoV Area,Vmax: 2.75 cm2  (2.5-4.5cm2)  RIGHT VENTRICLE: RV Basal 3.50 cm RV Mid   2.40 cm RV Major 6.8 cm TAPSE:   27.3 mm RV s'    0.13 m/s  TRICUSPID VALVE/RVSP:          Normal Ranges: Peak TR Velocity:     2.40 m/s RV Syst Pressure:     31 mmHg  (< 30mmHg) IVC Diam:             2.60 cm  PULMONIC VALVE:          Normal Ranges: PV Accel Time:  124 msec (>120ms) PV Max Terrence:     1.1 m/s  (0.6-0.9m/s) PV  Max P.9 mmHg  Pulmonary Veins: PulmV A Revs Dur: 127.00 msec PulmV A Revs Terrence: 33.40 cm/s PulmV Lane Terrence:   39.00 cm/s PulmV S/D Terrence:    1.40 PulmV Sys Terrence:    55.30 cm/s  [id number] Jorge Munoz MD Electronically signed on 2024 at 5:36:48 PM  ** Final **     CT angio coronary art with heartflow if score >30%      VASCULAR: 1. No thoracic or abdominal aortic aneurysm or acute aortic pathology.   CHEST ABDOMEN PELVIS: 1. No acute abnormality of the chest, abdomen or pelvis. 2. Additional incidental non-acute findings as detailed above.     I personally reviewed the images/study and I agree with the findings as stated by Dr. Jayleen Finnegan. This study was interpreted at [place], [place].   MACRO: None.   Signed by: Kofi Green 2024 10:43 AM Dictation workstation:   [id number]    XR chest 2 views    Result Date: 2024  Interpreted By:  Subhash Isaacs  and Naseem Wolfe STUDY: XR CHEST 2 VIEWS;  2024 9:12 am   INDICATION: Signs/Symptoms:chest pain.   COMPARISON: CT lung screening 2024   ACCESSION NUMBER(S): [id number]   ORDERING CLINICIAN: VICENTE MACHUCA   FINDINGS: PA and lateral radiographs of the chest were provided.   CARDIOMEDIASTINAL SILHOUETTE: Cardiomediastinal silhouette is mildly enlarged in size and configuration.   LUNGS: Lungs are clear.   ABDOMEN: No remarkable upper abdominal findings.   BONES: No acute osseous changes.       1.  No evidence of acute cardiopulmonary process.   I personally reviewed the images/study and I agree with the findings as stated by resident physician Aiden Gusman MD. This study was interpreted at [place], [place].   MACRO: None   Signed by: Subhash Isaacs 2024 9:45 AM Dictation workstation:   [id number]      Assessment and plan:  Dorinda Benson is a 69 y.o. female with pmh of HTN, prediabetes (A1c 5.9), HLD and substance use presenting for ED follow up. She  was admitted from 12/6 to 12/8 for chest pain c/f NSTEMI, s/p PCI with drug eluting stent placement to the RCA. Overall she is doing well since discharge and working to make lifestyle changes, including improving her diet and decreasing tobacco and alcohol use. In review of labs during admission, patient had macrocytic anemia. This is likely due to alcohol use. Will check folate and B12 during follow up visit to rule out other etiologies. Will start multivitamin    #Hospital follow up  #CAD   #NSTEMI s/p PCI to RCA   -Continue ASA 81 mg daily and Brillinta 90mg daily  -Continue Atorvastatin 40 mg  -Cardiac rehab starting on 12/19  -Follow up with cardiology 1/7/25    #Prediabetes  -Hga1c 5.8 12/25  -Continue lifestyle changes    #HTN  -BP in office today 104/67  -Continue amlodipine 5mg and losartan 100mg  -advised patient to check pressures at home and bring in log at next visit. If BP running too low, can consider discontinuing amlodipine.    #Alcohol use  #Tobacco use  -Discussed NRT - patient declined at this time  -Discussed counseling - patient declined at this time      HEALTH MAINTENANCE     Vaccines:  Influenza: refused by the patient  COVID: had x1  Tdap: received on 5/29/2024  Zoster vaccine (adult >51 yo, 2 doses 2-6mo apart):  Completed  Pneumococcal vaccine (adult <65 w/ risk factors eg, smoking, diabetes, heart/liver/lung disease; or adult >65: PCV 21, PCV 20, or PCV15 followed by PPSV23): PCV20 in 2023     Cancer screening:  Mammogram (ACOG rec age 40, USPSTF age 50: shared decision making 40-50, then q1-2 yrs until age 74, and then shared decision making): last mammogram 5/29/2024, negative for malignancy  Pap Smear (21-29 every 3 yrs; 30-65  every 5 yrs w/HPV or every 3 years if no HPV): DUE - 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 1 year follow up pap w/HPV recommended  Colonoscopy (age 45-76 yo): last done in 2/22/2023, 2-4 polyps resected, recommended repeat colonoscopy in 3 - 5 years  for surveillance   Low dose chest CT (age 50-80, 20 yrs smoking hx, current or quit within last 15yrs): Low dose CT done in August 2024: stable 3mm pulmonary nodules in the left lung, likely benign     Other screening tests:  AAA screening: N/A  DEXA scan (females >65 or <65 w/ hx of hip fracture): last done 4/27/2023  HbA1c: 5.8 (12/2024)  ASCVD score: 25.9     Antibody screening:  HIV: negative 2024  Syphilis: negative 2024  Hepatitis B: negative 2023  Hepatitis C: negative 2023    Plan     Follow-up in 6 weeks for labwork - CMP, Lipids, Folate, B12    Patient and plan discussed with attending physician Dr. Earl.    Lawrence Lima MD  Internal Medicine-Pediatrics PGY2  Epic Chat

## 2024-12-16 NOTE — PROGRESS NOTES
Advanced care planning discussed at this visit.  Patient does not currently have a Healthcare Power of  or Living Will in place. She does express that her aunt Sophie Hernandez has her permission to act as her surrogate decision maker in the event of an emergency. Patient advised to bring in POA, Living Will and Advanced Care Plan for her chart if she obtains one.  Patient was given information on POA and Living Will as well as documents to complete each when they wish.

## 2024-12-16 NOTE — LETTER
December 16, 2024     Patient: Dorinda Benson   YOB: 1955   Date of Visit: 12/16/2024       To Whom It May Concern:    It is my medical opinion that Dorinda Benson should remain out of work until she does cardiac rehab and follows up with her cardiologist. Her cardiology apointment is currently scheduled for 1/7/2025 .    If you have any questions or concerns, please don't hesitate to call.         Sincerely,        Lawrence Lima MD    CC: No Recipients

## 2024-12-16 NOTE — PATIENT INSTRUCTIONS
It was a pleasure seeing you in clinic today!    You seem to be doing great. You are due for a pap smear with your OBGYN.     Please check your blood pressures at home. If they are running low, then we can back up on some of your blood pressure medications.    Please follow up in 6 weeks for follow up visit and lab work.      If you are thinking about quitting smoking and would like some help, a quitline might be just what you need to succeed. Quitlines provide free coaching--over the phone--to help you quit smoking. When you call 1-800-QUIT-NOW , you can speak confidentially with a highly trained quit .    If you are interested in nicotine replacement therapy to help you quit smoking please let us know! Additionally, we have options to reduce alcohol cravings.

## 2024-12-18 ENCOUNTER — PHARMACY VISIT (OUTPATIENT)
Dept: PHARMACY | Facility: CLINIC | Age: 69
End: 2024-12-18
Payer: COMMERCIAL

## 2024-12-18 ENCOUNTER — TELEPHONE (OUTPATIENT)
Dept: PRIMARY CARE | Facility: HOSPITAL | Age: 69
End: 2024-12-18
Payer: COMMERCIAL

## 2024-12-18 PROCEDURE — RXMED WILLOW AMBULATORY MEDICATION CHARGE

## 2024-12-18 NOTE — TELEPHONE ENCOUNTER
Patient called stating she has been experiencing black stool with like coffee grind for the last two days she believes its tied to her current medications she was recent prescribed, She also states she feels weak in addition to these symptoms. Please call her to discuss.

## 2024-12-18 NOTE — TELEPHONE ENCOUNTER
Called patient regarding her dark tarry stools and weakness. Patient states that she had a black, coffee-ground like bowel movement both evening of 12/17 and AM of 12/18. Pt also endorsing weakness. She recently underwent PCI to RCA with JIM on 12/6 and initiated on ASA and brillinta. She denies prior history of GIB or dark, tarry stools in the past. Given her constellation of sx and recent initiation on DAPT, I am concerned that the patient may have a GIB. I advised patient that she should present to the local ED as soon as possible to be evaluated. She voiced understanding and was agreeable to plan.

## 2024-12-19 ENCOUNTER — CLINICAL SUPPORT (OUTPATIENT)
Dept: CARDIAC REHAB | Facility: HOSPITAL | Age: 69
End: 2024-12-19
Payer: COMMERCIAL

## 2024-12-19 VITALS
RESPIRATION RATE: 18 BRPM | HEART RATE: 63 BPM | HEIGHT: 64 IN | WEIGHT: 161.2 LBS | DIASTOLIC BLOOD PRESSURE: 64 MMHG | OXYGEN SATURATION: 99 % | SYSTOLIC BLOOD PRESSURE: 112 MMHG | BODY MASS INDEX: 27.52 KG/M2

## 2024-12-19 DIAGNOSIS — I21.4 NSTEMI (NON-ST ELEVATED MYOCARDIAL INFARCTION) (MULTI): Primary | ICD-10-CM

## 2024-12-19 ASSESSMENT — PATIENT HEALTH QUESTIONNAIRE - PHQ9
SUM OF ALL RESPONSES TO PHQ QUESTIONS 1-9: 11
1. LITTLE INTEREST OR PLEASURE IN DOING THINGS: MORE THAN HALF THE DAYS
SUM OF ALL RESPONSES TO PHQ9 QUESTIONS 1 & 2: 3
3. TROUBLE FALLING OR STAYING ASLEEP OR SLEEPING TOO MUCH: MORE THAN HALF THE DAYS
9. THOUGHTS THAT YOU WOULD BE BETTER OFF DEAD, OR OF HURTING YOURSELF: NOT AT ALL
7. TROUBLE CONCENTRATING ON THINGS, SUCH AS READING THE NEWSPAPER OR WATCHING TELEVISION: NOT AT ALL
6. FEELING BAD ABOUT YOURSELF - OR THAT YOU ARE A FAILURE OR HAVE LET YOURSELF OR YOUR FAMILY DOWN: SEVERAL DAYS
8. MOVING OR SPEAKING SO SLOWLY THAT OTHER PEOPLE COULD HAVE NOTICED. OR THE OPPOSITE, BEING SO FIGETY OR RESTLESS THAT YOU HAVE BEEN MOVING AROUND A LOT MORE THAN USUAL: SEVERAL DAYS
4. FEELING TIRED OR HAVING LITTLE ENERGY: NEARLY EVERY DAY
5. POOR APPETITE OR OVEREATING: SEVERAL DAYS
SUM OF ALL RESPONSES TO PHQ QUESTIONS 1-9: 11
2. FEELING DOWN, DEPRESSED OR HOPELESS: SEVERAL DAYS

## 2024-12-19 ASSESSMENT — DUKE ACTIVITY SCORE INDEX (DASI)
CAN YOU TAKE CARE OF YOURSELF (EAT, DRESS, BATHE, OR USE TOILET): YES
CAN YOU PARTICIPATE IN STRENOUS SPORTS LIKE SWIMMING, SINGLES TENNIS, FOOTBALL, BASKETBALL, OR SKIING: NO
CAN YOU WALK INDOORS, SUCH AS AROUND YOUR HOUSE: YES
DASI METS SCORE: 3.6
CAN YOU PARTICIPATE IN MODERATE RECREATIONAL ACTIVITIES LIKE GOLF, BOWLING, DANCING, DOUBLES TENNIS OR THROWING A BASEBALL OR FOOTBALL: NO
CAN YOU DO LIGHT WORK AROUND THE HOUSE LIKE DUSTING OR WASHING DISHES: YES
CAN YOU DO HEAVY WORK AROUND THE HOUSE LIKE SCRUBBING FLOORS OR LIFTING AND MOVING HEAVY FURNITURE: NO
CAN YOU WALK A BLOCK OR TWO ON LEVEL GROUND: NO
CAN YOU CLIMB A FLIGHT OF STAIRS OR WALK UP A HILL: NO
CAN YOU DO MODERATE WORK AROUND THE HOUSE LIKE VACUUMING, SWEEPING FLOORS OR CARRYING GROCERIES: NO
CAN YOU RUN A SHORT DISTANCE: NO
TOTAL_SCORE: 7.2
CAN YOU DO YARD WORK LIKE RAKING LEAVES, WEEDING OR PUSHING A MOWER: NO
CAN YOU HAVE SEXUAL RELATIONS: NO

## 2024-12-19 NOTE — PROGRESS NOTES
Cardiac Rehabilitation Initial Treatment Plan    Name: Dorinda Benson   Medical Record Number: [id number]  YOB: 1955   Age: 69 y.o.  Today’s Date: 12/19/2024   Primary Care Physician: Patel Blount MD   Referring Physician: Harris De La Rosa MD / Zohaib Carty MD  Program Location: [address]  Primary Diagnosis: NSTEMI (non-ST elevated myocardial infarction) (Multi) [I21.4]   Onset/Date of Diagnosis: 12/06/24   Session #: 0 / 36  AACVPR Risk Stratification: High   Falls Risk: Low    Psychosocial Initial Assessment:   Stress Assessment  Pre PHQ-9: 11  No data recorded   Sent PH-Q 9 to MD if score > 20: No; score < 20    Quality of Life Survey: SF-36   SF-36 Pre Post   Physical Component Score completed TBD   Mental Component Score completed TBD     Pt reported/currently experiencing stress: Yes; Stress; Severity: moderate  Patient uses stress management skills: No   History of: no history of anxiety or depression  Currently seeing a mental health provider: No  Social Support: Yes, Whom:family    Learning Assessment  Learning assessment/barriers: None  Preferred learning method: Auditory, Visual, Reading handout, and Writing handout  Barriers: None  Comments:    Stages of Change: Preparation    Psychosocial Plan  Goal Status: Initial Assessment; goals not yet started  Psychosocial Goals: Demonstrating proper techniques for stress management and Maintain or lower PH-Q 9 score by discharge  Psychosocial Interventions/Education:   To be done in Cardiac Rehab.    Nutrition Initial Assessment:  Diet Habit Survey: Picture Your Plate  Pre: Survey to be given during initial exercise session.  Post: To be done at discharge.    Survey results reviewed with Dietician: Not at this time    Lipids Assessment  Current Dietary Guidelines: Low fat  Barriers to dietary change: no  Hyperlipidemia: Yes   Lab Results   Component Value Date    CHOL 272 (H) 12/06/2024    HDL 73.5 12/06/2024    LDLCALC  161 (H) 12/06/2024    TRIG 190 (H) 12/06/2024     Diabetes Assessment  History of Diabetes: No  Lab Results   Component Value Date    HGBA1C 5.8 (H) 12/06/2024      Weight Management      Nutrition Plan  Goal Status: Initial Assessment; goals not yet started  Nutrition Goals: Lipid Goal: HDL>45, LDL <70, Total <180, Trigs <150, Improve Diet Habit Survey score by 5-10 points by discharge, Adapt a Mediterranean focused diet prior to discharge, Learn how to read and interpret nutrition labels prior to discharge, and Lose 1lb/week while enrolled in program  Nutrition Interventions/Education:   To be done in Cardiac Rehab.    Exercise Initial Assessment:  Home Exercise  Current Home Exercise?: No  Mode: NA  Frequency: NA  Duration: NA   Home Exercise Prescription given: To be given prior to discharge from program.    Exercise Prescription  Exercise Prescription based on: Pre-rehab Stress Test; completed 1/8/24  Resistance Training: No   Frequency:  3 days/week  Mode: Treadmill, NuStep, and Recumbent Cycle  Duration: 24 total aerobic minutes  Intensity: RPE 12-16  Target HR:   ; THR calculated via exercise Stress Test by program EP  MET Level: 3.9  Patient wears supplemental O2: No  Modality Workload METs Duration (minutes)   Pre-Exercise      Treadmill 2.5 mph @ 2 % 3.6 8 :00   NuStep 4000 3 load @ 60 winters 3.9 8 :00   Recumbent Bike 4 load @ 55 rpms 2.9 8 :00   Post-Exercise         Exercise Plan  Goal Status: Initial Assessment; goals not yet started  Exercise Goals: Increase exercise MET level by 5-10% each week, Increase total exercise duration to 30-45 minutes, Initiate strength training 2-3 days a week, Initiate flexibility training 2-3 days a week, and Establish a home exercise program before discharge  Exercise Interventions/Education:   To be done in Cardiac Rehab.    Other Core Components/Risk Factor Initial Assessment:  Medication Adherence  Medication compliance: Yes  Uses pill box/organizer: Yes  "  Carries medication list: Yes ; MyChart  Is patient prescribed Nitroglycerin? No  Current Medications:   Medication Documentation Review Audit       Reviewed by Inna Edouard RN (Registered Nurse) on 12/19/24 at 1515      Medication Order Taking? Sig Documenting Provider Last Dose Status   amLODIPine (Norvasc) 5 mg tablet [id number] Yes TAKE 1 TABLET BY MOUTH ONCE DAILY Yasmeen Bishop MD MPH  Active   aspirin 81 mg EC tablet [id number] Yes Take 1 tablet (81 mg) by mouth once daily. Raymon Patterson MD  Active   atorvastatin (Lipitor) 80 mg tablet [id number] Yes Take 1 tablet (80 mg) by mouth once daily at bedtime. Raymon Patterson MD  Active   diphenhydramine HCl (BENADRYL ALLERGY ORAL) [id number] Yes Take 1 tablet by mouth as needed at bedtime. Historical MD Mari  Active   losartan (Cozaar) 100 mg tablet [id number] Yes Take 1 tablet (100 mg) by mouth once daily. Patel Blount MD  Active   multivitamin tablet [id number] Yes Take 1 tablet by mouth once daily. Lawrence Lima MD  Active   ticagrelor (Brilinta) 90 mg tablet [id number] Yes Take 1 tablet (90 mg) by mouth 2 times a day. Raymon Patterson MD  Active                Blood Pressure Management  History of Hypertension: Yes   Medication Changes: No   Resting BP: /64   Pulse 63   Resp 18   Ht 1.626 m (5' 4\")   Wt 73.1 kg (161 lb 3.2 oz)   SpO2 99%   BMI 27.67 kg/m²      Heart Failure Management  Hx of Heart Failure: No    Smoking/Tobacco Assessment  Social History     Tobacco Use    Smoking status: Every Day     Current packs/day: 0.50     Types: Cigarettes    Smokeless tobacco: Never   Substance Use Topics    Alcohol use: Yes     Comment: 4 pack Stripe with a shot of vodka for each beer daily    Drug use: Yes     Types: Marijuana      Other Core Component Plan  Goal Status: Initial Assessment; goals not yet started  Other Core Component Goals: Medication compliance, Verbalize medication usage and drug actions by discharge, Begin tobacco cessation program, " "Set tobacco cessation quit date while enrolled, and Achieve resting BP of < 130/80 by discharge  Other Core Component Interventions/Education:   To be done in Cardiac Rehab.    Individual Patient Goals:  \"Better health and feel good about the decisions that I'm making.\"  Establish heart healthy diet by discharge from program.  Establish home exercise routine by discharge from program.   Goal Status: Initial Assessment; goals not yet started    Staff Comments:  Initial assessment done In Person at [facility]. Pending completion of pre-rehab stress test.     Rehab Staff Signature: Inna Edouard RN   "

## 2024-12-20 ENCOUNTER — PHARMACY VISIT (OUTPATIENT)
Dept: PHARMACY | Facility: CLINIC | Age: 69
End: 2024-12-20
Payer: COMMERCIAL

## 2024-12-26 DIAGNOSIS — I10 BENIGN ESSENTIAL HYPERTENSION: ICD-10-CM

## 2024-12-27 PROCEDURE — RXMED WILLOW AMBULATORY MEDICATION CHARGE

## 2024-12-27 RX ORDER — AMLODIPINE BESYLATE 5 MG/1
5 TABLET ORAL DAILY
Qty: 90 TABLET | Refills: 3 | Status: SHIPPED | OUTPATIENT
Start: 2024-12-27 | End: 2025-12-27

## 2024-12-27 RX ORDER — AMLODIPINE BESYLATE 5 MG/1
5 TABLET ORAL DAILY
Qty: 30 TABLET | Refills: 0 | Status: SHIPPED | OUTPATIENT
Start: 2024-12-27 | End: 2024-12-27 | Stop reason: SDUPTHER

## 2024-12-28 ENCOUNTER — PHARMACY VISIT (OUTPATIENT)
Dept: PHARMACY | Facility: CLINIC | Age: 69
End: 2024-12-28
Payer: COMMERCIAL

## 2024-12-31 PROCEDURE — RXMED WILLOW AMBULATORY MEDICATION CHARGE

## 2025-01-03 ENCOUNTER — PHARMACY VISIT (OUTPATIENT)
Dept: PHARMACY | Facility: CLINIC | Age: 70
End: 2025-01-03
Payer: COMMERCIAL

## 2025-01-03 PROCEDURE — RXMED WILLOW AMBULATORY MEDICATION CHARGE

## 2025-01-07 ENCOUNTER — HOSPITAL ENCOUNTER (OUTPATIENT)
Dept: CARDIOLOGY | Facility: HOSPITAL | Age: 70
Discharge: HOME | End: 2025-01-07
Payer: COMMERCIAL

## 2025-01-07 ENCOUNTER — OFFICE VISIT (OUTPATIENT)
Dept: CARDIOLOGY | Facility: HOSPITAL | Age: 70
End: 2025-01-07
Payer: COMMERCIAL

## 2025-01-07 VITALS
BODY MASS INDEX: 28.41 KG/M2 | HEIGHT: 64 IN | SYSTOLIC BLOOD PRESSURE: 117 MMHG | HEART RATE: 81 BPM | DIASTOLIC BLOOD PRESSURE: 72 MMHG | WEIGHT: 166.4 LBS | OXYGEN SATURATION: 98 %

## 2025-01-07 DIAGNOSIS — I10 PRIMARY HYPERTENSION: ICD-10-CM

## 2025-01-07 DIAGNOSIS — I25.10 CORONARY ARTERY DISEASE INVOLVING NATIVE CORONARY ARTERY OF NATIVE HEART WITHOUT ANGINA PECTORIS: ICD-10-CM

## 2025-01-07 DIAGNOSIS — I21.4 NON-ST ELEVATION (NSTEMI) MYOCARDIAL INFARCTION (MULTI): Primary | ICD-10-CM

## 2025-01-07 DIAGNOSIS — I21.4 NSTEMI (NON-ST ELEVATED MYOCARDIAL INFARCTION) (MULTI): ICD-10-CM

## 2025-01-07 DIAGNOSIS — Z95.5 PRESENCE OF DRUG COATED STENT IN RIGHT CORONARY ARTERY: ICD-10-CM

## 2025-01-07 DIAGNOSIS — I10 BENIGN ESSENTIAL HYPERTENSION: ICD-10-CM

## 2025-01-07 PROCEDURE — 1126F AMNT PAIN NOTED NONE PRSNT: CPT | Performed by: INTERNAL MEDICINE

## 2025-01-07 PROCEDURE — 3074F SYST BP LT 130 MM HG: CPT | Performed by: INTERNAL MEDICINE

## 2025-01-07 PROCEDURE — 1160F RVW MEDS BY RX/DR IN RCRD: CPT | Performed by: INTERNAL MEDICINE

## 2025-01-07 PROCEDURE — 3008F BODY MASS INDEX DOCD: CPT | Performed by: INTERNAL MEDICINE

## 2025-01-07 PROCEDURE — 3078F DIAST BP <80 MM HG: CPT | Performed by: INTERNAL MEDICINE

## 2025-01-07 PROCEDURE — 1159F MED LIST DOCD IN RCRD: CPT | Performed by: INTERNAL MEDICINE

## 2025-01-07 PROCEDURE — G2211 COMPLEX E/M VISIT ADD ON: HCPCS | Performed by: INTERNAL MEDICINE

## 2025-01-07 PROCEDURE — 1111F DSCHRG MED/CURRENT MED MERGE: CPT | Performed by: INTERNAL MEDICINE

## 2025-01-07 PROCEDURE — 99214 OFFICE O/P EST MOD 30 MIN: CPT | Performed by: INTERNAL MEDICINE

## 2025-01-07 PROCEDURE — 1123F ACP DISCUSS/DSCN MKR DOCD: CPT | Performed by: INTERNAL MEDICINE

## 2025-01-07 RX ORDER — LOSARTAN POTASSIUM 100 MG/1
100 TABLET ORAL DAILY
Qty: 30 TABLET | Refills: 11 | Status: SHIPPED | OUTPATIENT
Start: 2025-01-07 | End: 2026-01-02

## 2025-01-07 RX ORDER — AMLODIPINE BESYLATE 5 MG/1
5 TABLET ORAL DAILY
Qty: 90 TABLET | Refills: 3 | Status: SHIPPED | OUTPATIENT
Start: 2025-01-07 | End: 2026-01-07

## 2025-01-07 RX ORDER — ASPIRIN 81 MG/1
81 TABLET ORAL DAILY
Qty: 30 TABLET | Refills: 11 | Status: SHIPPED | OUTPATIENT
Start: 2025-01-07 | End: 2026-01-07

## 2025-01-07 RX ORDER — ATORVASTATIN CALCIUM 80 MG/1
80 TABLET, FILM COATED ORAL NIGHTLY
Qty: 30 TABLET | Refills: 11 | Status: SHIPPED | OUTPATIENT
Start: 2025-01-07 | End: 2026-01-02

## 2025-01-07 ASSESSMENT — ENCOUNTER SYMPTOMS
OCCASIONAL FEELINGS OF UNSTEADINESS: 0
DEPRESSION: 0
LOSS OF SENSATION IN FEET: 0

## 2025-01-07 ASSESSMENT — PAIN SCALES - GENERAL: PAINLEVEL_OUTOF10: 0-NO PAIN

## 2025-01-07 ASSESSMENT — PATIENT HEALTH QUESTIONNAIRE - PHQ9
2. FEELING DOWN, DEPRESSED OR HOPELESS: NOT AT ALL
SUM OF ALL RESPONSES TO PHQ9 QUESTIONS 1 AND 2: 0
1. LITTLE INTEREST OR PLEASURE IN DOING THINGS: NOT AT ALL

## 2025-01-07 ASSESSMENT — COLUMBIA-SUICIDE SEVERITY RATING SCALE - C-SSRS
1. IN THE PAST MONTH, HAVE YOU WISHED YOU WERE DEAD OR WISHED YOU COULD GO TO SLEEP AND NOT WAKE UP?: NO
2. HAVE YOU ACTUALLY HAD ANY THOUGHTS OF KILLING YOURSELF?: NO
6. HAVE YOU EVER DONE ANYTHING, STARTED TO DO ANYTHING, OR PREPARED TO DO ANYTHING TO END YOUR LIFE?: NO

## 2025-01-07 NOTE — PROGRESS NOTES
Subjective   Dorinda Benson is a 69 y.o. female who presents to the [facility] for evaluation of coronary artery disease after December 2024 acute MI (RCA culprit lesion).    She presented with chest pain 12/5 evening that resolved before admission to ER. 12/6 ECG showed nonspecific T wave changes but not STEMI ECG criteria. CCTA showed acutely occluded mid RCA and taken emergently to cath lab with CICU overnight observation. LV EF 70% without LV systolic wall motion abnormality.    Since discharge, Ms. Benson has no active cardiac symptoms of chest pain, PND, orthopnea, OMAR, palpitations, syncope, or claudication. Notes exertional dyspnea with climbing 1-2 flights of stairs.    Past Medical History:  1. Coronary artery disease s/p 12/6/2024 mid RCA PCI for 100% thrombotic occlusion.  2. Dyslipidemia  3. Hypertension  4. Nicotine dependence    Social History:  Active smoker (< 1/2 pack/day now)    Family History:  No premature CAD in 1st degree relatives ( 55 years of age for male relatives,  65 years of age for female relatives)     Review of Systems    A 14 point review of systems was asked. All questions were negative except for pertinent positives listed in the HPI.     Current Outpatient Medications on File Prior to Visit   Medication Sig Dispense Refill    amLODIPine (Norvasc) 5 mg tablet TAKE 1 TABLET BY MOUTH ONCE DAILY 90 tablet 3    aspirin 81 mg EC tablet Take 1 tablet (81 mg) by mouth once daily. 30 tablet 11    atorvastatin (Lipitor) 80 mg tablet Take 1 tablet (80 mg) by mouth once daily at bedtime. 30 tablet 1    diphenhydramine HCl (BENADRYL ALLERGY ORAL) Take 1 tablet by mouth as needed at bedtime.      losartan (Cozaar) 100 mg tablet Take 1 tablet (100 mg) by mouth once daily. 30 tablet 11    multivitamin tablet Take 1 tablet by mouth once daily. 30 tablet 11    ticagrelor (Brilinta) 90 mg tablet Take 1 tablet (90 mg) by mouth 2 times a day. 60 tablet 2     No current  "facility-administered medications on file prior to visit.         Objective   Physical Exam  BP Readings from Last 3 Encounters:   25 117/72   24 112/64   24 104/67      Wt Readings from Last 3 Encounters:   25 75.5 kg (166 lb 6.4 oz)   24 73.1 kg (161 lb 3.2 oz)   24 73.9 kg (163 lb)      BMI: Estimated body mass index is 28.56 kg/m² as calculated from the following:    Height as of this encounter: 1.626 m (5' 4\").    Weight as of this encounter: 75.5 kg (166 lb 6.4 oz).  BSA: Estimated body surface area is 1.85 meters squared as calculated from the following:    Height as of this encounter: 1.626 m (5' 4\").    Weight as of this encounter: 75.5 kg (166 lb 6.4 oz).    General: no acute distress  HEENT: EOMI, no scleral icterus.  Lungs: Clear to auscultation bilaterally without wheezing, rales, or rhonchi.  Cardiovascular: Regular rhythm and rate. Normal S1 and S2. No murmurs, rubs, or gallops are appreciated. JVP normal.  Abdomen: Soft, nontender, nondistended. Bowel sounds present.  Extremities: Warm and well perfused with equal 2+ pulses bilaterally.  No edema present.  Neurologic: Alert and oriented x3.      I have personally reviewed the following images and laboratory findings:  Last echocardiogram:  2024 echocardiogram: 70-75%, normal LV size, no LVH (LVMI 76 gm/m2), normal diastology (E/e' 9, E/A 0.74), normal LA size (ANAMARIA 28 ml/m2), negative bubble study for PFO, normal RV/RA, trace AI, trace MR, trace TR, RVSP 31 mm Hg (RAP 3 mm Hg).    Last cath / stress test:  2024 LHC: LMCA: < 30% plaque; LAD: < 30% plaque; LCx: < 30% plaque; RCA: 100% mid acute thrombotic lesion s/p Martinez Ladson 3 x 22 mm JIM with 0% residual stenosis.    2024 CCTA: Acute mid RCA occlusion; otherwise normal coronary arteries.    Most recent EC2025 ECG: Sinus rhythm, 73 bpm, normal ECG. Personally reviewed in office.    Lab Results   Component Value Date    CHOL 272 (H) " "12/06/2024    CHOL 226 (H) 05/08/2024    CHOL 241 (H) 11/22/2023    CHOL 241 (H) 11/22/2023     Lab Results   Component Value Date    HDL 73.5 12/06/2024    HDL 65.4 05/08/2024    HDL 59.3 11/22/2023    HDL 60.2 11/22/2023     Lab Results   Component Value Date    LDLCALC 161 (H) 12/06/2024    LDLCALC 146 (H) 11/22/2023     Lab Results   Component Value Date    TRIG 190 (H) 12/06/2024    TRIG 174 (H) 11/22/2023     No components found for: \"CHOLHDL\"      Assessment/Plan   1. CAD:  S/p JIM to mid RCA 12/6/2024. Continue DAPT (ticagrelor 90 mg twice a day and aspirin 81 mg a day) for the next 12 months (through 12/2025). Aspirin 81 mg a day for life.     For dyslipidemia, continue atorvastatin 80 mg a day. Had some hives after restarting medication but these have resolved and Ms. Benson would like to continue current medication rather than change to rosuvastatin 40 mg a day at this time. Goal LDL < 70    Blood pressure at goal range 120-130 mm Hg now taking amlodipine 5 mg a day and losartan 100 mg. Continue both medications for hypertension at current dose.    Cardiac rehab order placed and patient in process to start this month at [facility].     2. Nicotine dependence:  Smoking cessation counseled for > 3 minutes.    Time = 45 minutes on direct patient care reviewing current symptoms and discussing treatment options, reviewing prior cardiac care, and coordinating follow up testing and medication renewal.        SIGNATURE: Zohaib Carty M.D.  [facility]  Senior Attending, Division of Cardiovascular Medicine  Co-Director, [facility]   of Medicine  [facility] [employer]  [address]  [place] [address]  Phone: [phone]  Fax: [phone]  Appointment: [phone]  PATIENT NAME: Dorinda Benson   DATE/TIME: January 7, 2025 3:09 PM MRN: [id number]     "

## 2025-01-07 NOTE — PATIENT INSTRUCTIONS
You were seen at the [facility] for a check up of your heart.     For your right coronary artery heart stent, continue Brilinta 90 mg twice a day and aspirin 81 mg a day for the next 12 months until the end of December 2025 to prevent a blood clot forming inside your heart stent which would cause a massive heart attack.    Continue atorvastatin 80 mg a day to lower your cholesterol. Repeat fasting cholesterol lab work before our next visit.    Continue amlodipine 5 mg a day and losartan 100 mg a day for blood pressure.     Check your blood pressure every morning about 30-60 minutes after taking your morning blood pressure medications and keep a log book. Sit for 3-5 minutes in a chair to relax and place your arm on a table or counter to be level with your heart. Goal blood pressure range for you is 120-130 mm Hg.     Eat a heart healthy diet. Limit portions of red meat. Eat fresh fruits and vegetables instead of processed carbohydrates. Olive oil (1 tablespoon a day), avocados, tree nuts as a source of  omega-3 fat. Beans and legumes are good sources of plant based protein and fiber. The Mediterranean diet has been shown in clinical trials to reduce risk of heart disease by following these principles.     Cardiac rehab is a great program to get your exercise capacity build up, work on quitting smoking, and learn about lifestyle changes to reduce your future risk of having a heart attack.    I encourage you to pick a quit date for smoking. Stopping smoking/vaping is the biggest change you can do to lower your risk of having a heart attack or developing heart disease. Stopping smoking will also help control your blood pressure and reduce future risk of having a stroke or developing lung disease.    Use the [place] Quit Now resource page:  https://odh.[place].gov/wps/portal/gov/od/know-our-programs/tobacco-use-prevention-and-cessation/cessation/      Follow up with Dr. Carty in 4 months

## 2025-01-08 ENCOUNTER — HOSPITAL ENCOUNTER (OUTPATIENT)
Dept: CARDIOLOGY | Facility: HOSPITAL | Age: 70
Discharge: HOME | End: 2025-01-08
Payer: MEDICARE

## 2025-01-08 DIAGNOSIS — I21.4 NSTEMI (NON-ST ELEVATED MYOCARDIAL INFARCTION) (MULTI): ICD-10-CM

## 2025-01-08 PROCEDURE — 93016 CV STRESS TEST SUPVJ ONLY: CPT | Performed by: INTERNAL MEDICINE

## 2025-01-08 PROCEDURE — 93017 CV STRESS TEST TRACING ONLY: CPT

## 2025-01-08 PROCEDURE — 93018 CV STRESS TEST I&R ONLY: CPT | Performed by: INTERNAL MEDICINE

## 2025-01-09 LAB
ATRIAL RATE: 73 BPM
P AXIS: 33 DEGREES
P OFFSET: 193 MS
P ONSET: 143 MS
PR INTERVAL: 150 MS
Q ONSET: 218 MS
QRS COUNT: 12 BEATS
QRS DURATION: 76 MS
QT INTERVAL: 398 MS
QTC CALCULATION(BAZETT): 438 MS
QTC FREDERICIA: 425 MS
R AXIS: 15 DEGREES
T AXIS: 0 DEGREES
T OFFSET: 417 MS
VENTRICULAR RATE: 73 BPM

## 2025-01-09 PROCEDURE — 93005 ELECTROCARDIOGRAM TRACING: CPT

## 2025-01-13 ENCOUNTER — CLINICAL SUPPORT (OUTPATIENT)
Dept: CARDIAC REHAB | Facility: HOSPITAL | Age: 70
End: 2025-01-13
Payer: COMMERCIAL

## 2025-01-13 DIAGNOSIS — I21.4 NSTEMI (NON-ST ELEVATED MYOCARDIAL INFARCTION) (MULTI): ICD-10-CM

## 2025-01-13 PROCEDURE — 93798 PHYS/QHP OP CAR RHAB W/ECG: CPT | Performed by: INTERNAL MEDICINE

## 2025-01-13 NOTE — PROGRESS NOTES
Cardiac Rehab Note  Patient: Dorinda Benson   MRN: [id number]     1/13/2025 Initial exercise session completed without complications.  Reviewed ExRx with patient. Based on HR/BP/ RPD/ RPE, intensity/workload was decreased on Treadmill and Recumbent Bike. Patient rating RPE/RPD appropriately.     Exercise Prescription based on: Pre-rehab Stress Test; completed 1/8/24   Resistance Training: No   Frequency:  3 days/week  Mode: Treadmill, NuStep, and Recumbent Cycle  Duration: 24 total aerobic minutes  Intensity: RPE 12-16  Target HR:   bpm  Max MET Level: 3.9  Patient wears supplemental O2: No  Modality Workload METs Duration (minutes)   Pre-Exercise      Treadmill 2.0 mph @ 2 % 3.1 8 :00   NuStep 4000 3 load @ 60 winters 3.9 8 :00   Recumbent Bike 3 load @ 55 rpms 2.8 8 :00   Post-Exercise         Signature: Inna Edouard RN

## 2025-01-15 ENCOUNTER — CLINICAL SUPPORT (OUTPATIENT)
Dept: CARDIAC REHAB | Facility: HOSPITAL | Age: 70
End: 2025-01-15
Payer: COMMERCIAL

## 2025-01-15 DIAGNOSIS — I21.4 NSTEMI (NON-ST ELEVATED MYOCARDIAL INFARCTION) (MULTI): ICD-10-CM

## 2025-01-15 PROCEDURE — 93798 PHYS/QHP OP CAR RHAB W/ECG: CPT | Performed by: INTERNAL MEDICINE

## 2025-01-15 NOTE — PROGRESS NOTES
Cardiac Rehab Note  Patient: Dorinda Benson   MRN: [id number]     1/15/2025 Patient attended Heart Healthy Diet lecture with program RDN during today's exercise session. Heart Healthy diet tips sheet was given for further review at home and patient was encouraged to come back with any follow up questions.     Signature: Inna Edouard RN

## 2025-01-17 ENCOUNTER — PHARMACY VISIT (OUTPATIENT)
Dept: PHARMACY | Facility: CLINIC | Age: 70
End: 2025-01-17
Payer: COMMERCIAL

## 2025-01-17 ENCOUNTER — CLINICAL SUPPORT (OUTPATIENT)
Dept: CARDIAC REHAB | Facility: HOSPITAL | Age: 70
End: 2025-01-17
Payer: COMMERCIAL

## 2025-01-17 DIAGNOSIS — I21.4 NSTEMI (NON-ST ELEVATED MYOCARDIAL INFARCTION) (MULTI): ICD-10-CM

## 2025-01-17 PROCEDURE — RXMED WILLOW AMBULATORY MEDICATION CHARGE

## 2025-01-17 PROCEDURE — 93798 PHYS/QHP OP CAR RHAB W/ECG: CPT | Performed by: INTERNAL MEDICINE

## 2025-01-20 ENCOUNTER — APPOINTMENT (OUTPATIENT)
Dept: CARDIAC REHAB | Facility: HOSPITAL | Age: 70
End: 2025-01-20
Payer: COMMERCIAL

## 2025-01-22 ENCOUNTER — APPOINTMENT (OUTPATIENT)
Dept: CARDIAC REHAB | Facility: HOSPITAL | Age: 70
End: 2025-01-22
Payer: COMMERCIAL

## 2025-01-24 ENCOUNTER — CLINICAL SUPPORT (OUTPATIENT)
Dept: CARDIAC REHAB | Facility: HOSPITAL | Age: 70
End: 2025-01-24
Payer: COMMERCIAL

## 2025-01-24 DIAGNOSIS — I21.4 NSTEMI (NON-ST ELEVATED MYOCARDIAL INFARCTION) (MULTI): ICD-10-CM

## 2025-01-24 PROCEDURE — 93798 PHYS/QHP OP CAR RHAB W/ECG: CPT | Performed by: INTERNAL MEDICINE

## 2025-01-27 ENCOUNTER — APPOINTMENT (OUTPATIENT)
Dept: CARDIAC REHAB | Facility: HOSPITAL | Age: 70
End: 2025-01-27
Payer: COMMERCIAL

## 2025-01-28 ENCOUNTER — PHARMACY VISIT (OUTPATIENT)
Dept: PHARMACY | Facility: CLINIC | Age: 70
End: 2025-01-28
Payer: COMMERCIAL

## 2025-01-28 ENCOUNTER — OFFICE VISIT (OUTPATIENT)
Dept: PRIMARY CARE | Facility: HOSPITAL | Age: 70
End: 2025-01-28
Payer: COMMERCIAL

## 2025-01-28 VITALS
DIASTOLIC BLOOD PRESSURE: 79 MMHG | BODY MASS INDEX: 27.98 KG/M2 | SYSTOLIC BLOOD PRESSURE: 117 MMHG | HEART RATE: 75 BPM | WEIGHT: 163 LBS | TEMPERATURE: 97.9 F | OXYGEN SATURATION: 99 %

## 2025-01-28 DIAGNOSIS — F17.200: ICD-10-CM

## 2025-01-28 DIAGNOSIS — F41.9 ANXIETY: ICD-10-CM

## 2025-01-28 DIAGNOSIS — O99.331: ICD-10-CM

## 2025-01-28 DIAGNOSIS — I21.4 NSTEMI (NON-ST ELEVATED MYOCARDIAL INFARCTION) (MULTI): Primary | ICD-10-CM

## 2025-01-28 DIAGNOSIS — K92.1 MELENA: ICD-10-CM

## 2025-01-28 DIAGNOSIS — F17.200 TOBACCO USE DISORDER: ICD-10-CM

## 2025-01-28 DIAGNOSIS — D64.9 ANEMIA, UNSPECIFIED TYPE: ICD-10-CM

## 2025-01-28 DIAGNOSIS — F10.90 ALCOHOL USE DISORDER: ICD-10-CM

## 2025-01-28 PROCEDURE — 99214 OFFICE O/P EST MOD 30 MIN: CPT | Mod: GC

## 2025-01-28 PROCEDURE — RXMED WILLOW AMBULATORY MEDICATION CHARGE

## 2025-01-28 RX ORDER — LANOLIN ALCOHOL/MO/W.PET/CERES
100 CREAM (GRAM) TOPICAL DAILY
Qty: 30 TABLET | Refills: 11 | Status: SHIPPED | OUTPATIENT
Start: 2025-01-28 | End: 2026-01-28

## 2025-01-28 RX ORDER — FLUOXETINE HYDROCHLORIDE 20 MG/1
20 CAPSULE ORAL DAILY
Qty: 30 CAPSULE | Refills: 1 | Status: SHIPPED | OUTPATIENT
Start: 2025-01-28 | End: 2025-03-29

## 2025-01-28 ASSESSMENT — PAIN SCALES - GENERAL: PAINLEVEL_OUTOF10: 0-NO PAIN

## 2025-01-28 ASSESSMENT — ENCOUNTER SYMPTOMS
OCCASIONAL FEELINGS OF UNSTEADINESS: 0
DEPRESSION: 0
LOSS OF SENSATION IN FEET: 0

## 2025-01-28 NOTE — PROGRESS NOTES
"[facility]    SUBJECTIVE:  CC: 6 week follow-up     HPI: Dorinda Benson is a 69 y.o. female with a PMHx significant for CAD (NSTEMI 12/2024 s/p PCI with JIM to mid RCA, on DAPT with Brilinta and ASA), HTN, HLD, prediabetes (A1c 5.9), and substance use presenting for 6 week follow up.      Interval History/History per chart review:  She was admitted from 12/6 to 12/8 for chest pain c/f NSTEMI, s/p PCI with drug eluting stent placement to the RCA. She was discharged on Brilinta, Aspirin, Atorvastatin.     Last seen in clinic on 12/16/24 for post-hospitalization follow-up. During this visit she reported compliance with her DAPT. She also reported decrease in her alcohol use from 4 beers and 4 vodka shots per day to 1 beer per day, decreased tobacco use from 5-6 cigarettes per day to 1-1.5 cigarettes per day, as well as working to improving her diet.      Pt currently in cardiac rehabilitation.     Cardiology clinic follow up on 01/07/25. Planning for continued DAPT (ticagrelor 90 mg twice a day and aspirin 81 mg a day) for the next 12 months (through 12/2025), then aspirin for life. Continuing atorvastatin 80 mg once a day (for Goal LDL < 70 ). Continued amlodipine 5 mg and losartan 100 mg daily for HTN (for gaol -130). Planning for follow-up in 4 months with Dr. Carty.     Per the patient:  Patient's main complaint today is in regards to increased anxiety she has been feeling. She states that since she has returned home after the hospitalization for her heart attack, she is feeling overwhelmed with anxiety. She states that she has been remaining functional in her day to day life, working, caring for her home and for herself, but she is overwhelmed \"about everything\". And when she is done worrying about one thing, she \"just finds something else to worry about\". She states that she moved here to [place] with her mother about three years ago. In between then and now, her mother's health " "declined and ultimately she passed away. Then she had her heart attack in early December. She states that all of these big life changes have now piled up and pushed her \"over the edge\". Over the past month, she has also been increasingly emotional, crying often throughout the day which is unlike her.     Following her heart attack, she was working on cutting back on her alcohol intake and cigarettes usage. At her follow-up in December she was down to 1 beer per day and 1-1.5 cigarettes per day.However, she states that in order to cope with her anxiety she has been increasing both her alcohol and cigarette use. She knows this is not a healthy choice and is desiring to cut back and quit if possible.     She has never struggled with anxiety or depression in the past. She did take fluoxetine in the past when she was quitting substance use in the past and thought it was helpful back then. She is open to trying medical therapies now. She did ask if melatonin may be a good supplement to help her sleep at night. She states that she usually sleeps with the TV on at night because her \"apartment is so quiet\", but she has done this for years.  She is still in cardiac rehabilitation and enjoys going. She states that working out makes her feel less anxious. She states that she has great support from her family and [employer]. She has people who check in on her on a daily basis. She denies any SI or HI in the past and today.    In regards to her previous complaint of dark stools (called the office on 12/18), she states that today she did not have any dark/black in her well-formed stool this morning. But today was the first day she did not have dark stools since this started following initiation of DAPT in December.  She denies any BRBPR. She also denies any lightheadedness, dizziness, abdominal pain, diarrhea, or constipation. During her 12/18 phone call, she was referred to GI for consideration of possible scope. Pt states she is not " interested in undergoing EGD or colonoscopy at this time, so she did not call to schedule an appointment.    Health behaviors:  Diet and exercise: working on a healthier diet since her heart attack. In cardiac rehab currently.  Dental exams: not addressed this visit   Eye exams: not addressed this visit     Health maintenance:  Health Maintenance   Topic Date Due    Medicare Annual Wellness Visit (AWV)  Never done    Hepatitis A Vaccines (1 of 2 - Risk 2-dose series) Never done    RSV High Risk: (Elderly (60+) or Pregnant Population) (1 - Risk 60-74 years 1-dose series) Never done    Influenza Vaccine (1) 09/01/2024    COVID-19 Vaccine (1 - 2024-25 season) Never done    Bone Density Scan  04/27/2025    Mammogram  05/29/2025    Diabetes: Hemoglobin A1C  12/06/2025    Diabetes Screening  12/06/2025    Lipid Panel  12/06/2029    DTaP/Tdap/Td Vaccines (2 - Td or Tdap) 01/24/2033    Colorectal Cancer Screening  02/22/2033    Pneumococcal Vaccine  Completed    Zoster Vaccines  Completed    Hepatitis C Screening  Completed    HIB Vaccines  Aged Out    Hepatitis B Vaccines  Aged Out    IPV Vaccines  Aged Out    Meningococcal Vaccine  Aged Out    Rotavirus Vaccines  Aged Out    HPV Vaccines  Aged Out    Irritable Bowel Syndrome  Discontinued     Medications:    Current Outpatient Medications:     amLODIPine (Norvasc) 5 mg tablet, TAKE 1 TABLET BY MOUTH ONCE DAILY, Disp: 90 tablet, Rfl: 3    aspirin 81 mg EC tablet, Take 1 tablet (81 mg) by mouth once daily., Disp: 30 tablet, Rfl: 11    atorvastatin (Lipitor) 80 mg tablet, Take 1 tablet (80 mg) by mouth once daily at bedtime., Disp: 30 tablet, Rfl: 11    diphenhydramine HCl (BENADRYL ALLERGY ORAL), Take 1 tablet by mouth as needed at bedtime., Disp: , Rfl:     losartan (Cozaar) 100 mg tablet, Take 1 tablet (100 mg) by mouth once daily., Disp: 30 tablet, Rfl: 11    multivitamin tablet, Take 1 tablet by mouth once daily., Disp: 30 tablet, Rfl: 11    ticagrelor (Brilinta) 90 mg  tablet, Take 1 tablet (90 mg) by mouth 2 times a day., Disp: 60 tablet, Rfl: 11    Require med refills? None  [facility]        Past medical history:  Past Medical History:   Diagnosis Date    Asthma     COPD (chronic obstructive pulmonary disease) (Multi)     HLD (hyperlipidemia)     Hypertension        Allergies:  Allergies   Allergen Reactions    Aspirin Other     Per provider, patient gets upset stomach when taking aspirin    Latex Hives and Itching       Surgical history:  Past Surgical History:   Procedure Laterality Date    CARDIAC CATHETERIZATION N/A 12/6/2024    Procedure: Left Heart Cath with Coronary Angiography and LV;  Surgeon: Carli Calvert MD;  Location: [place] [address] Cardiac Cath Lab;  Service: Cardiovascular;  Laterality: N/A;    HYSTERECTOMY         Family history:  Family History   Problem Relation Name Age of Onset    Dementia Mother      Alcohol abuse Brother      Other (bowel obstruction) Brother      Alcohol abuse Other         Social history:   reports that she has been smoking cigarettes. She has never used smokeless tobacco. She reports that she does not currently use alcohol. She reports current drug use. Drug: Marijuana.    Social History     Social History Narrative    Not on file       1. Living situation: Lives alone in her apartment  2. Work: none  3. Alcohol: was down to 1 drink a day, recently increased intake over past couple weeks  4. Tobacco: 4-5 cigarettes a day  5. Other drugs: Denies    Safety:  - Housing insecurity, none  - Food insecurity, none  - Weapons in the home, none    Review of systems:  As per HPI    OBJECTIVE:  Vitals:  There were no vitals filed for this visit.    Physical exam:  Constitutional: Well-developed female in no acute distress.  HEENT: NC/AT, sclera anicteric  Respiratory: CTAB. No wheezes, rales, or rhonchi. Normal respiratory effort.  Cardiovascular: RRR. No murmurs, gallops, or rubs.  Abdominal: Soft, nondistended, nontender to  palpation. Bowel sounds present. No hepatosplenomegaly or masses.   Neuro: AAOx3. CN II-XII grossly intact. Tongue midline, no facial droop  MSK: No LE edema bilaterally. Moving extremities well  Skin: Warm, dry. No rashes or wounds.  Psych: Patient becomes easily emotional during visit, tearful    Labs:  No results found for this or any previous visit (from the past 24 hours).    Imaging:  ECG 12 Lead    Result Date: 1/14/2025  Normal sinus rhythm Normal ECG When compared with ECG of 06-DEC-2024 22:51, No significant change was found Confirmed by Pearl Wood ([id number]) on 1/14/2025 11:26:49 AM    Stress Test    Result Date: 1/10/2025    [facility], [address] [place] [address]                Tel [phone] and Fax [phone]  Exercise Stress Test Patient Name:     MYAH NGUYEN       Ordering Provider:    [id number] PEARL WOOD Study Date:       1/8/2025           Reading Physician:    [id number] Leonardo Barber MD MRN/PID:          [id number]           Supervising           [id number] Sally Farris MD                                      Physician: Accession#:       [id number]       Fellow: Date of           1955 / 69      Fellow: Birth/Age:        years Gender:           F                  Nurse:                Britt Barron RN Admit Date:                          Technician:           Bertha Riley Admission Status: Outpatient         Sonographer:          N/A Height:           162.56 cm          Technologist: Weight:           75.75 kg           Additional Staff: BSA:              1.81 m2            Encounter#:           [id number] BMI:              28.67 kg/m2        Patient Location: Study Type:    STRESS TEST ONLY Diagnosis/ICD: NonST elevation (NSTEMI) myocardial infarction-I21.4 Indication:    NSTEMI and pre cardiac rehab CPT Code:      Stress Test Interpretation-05846; Stress Test Supervision-[id number] Falls Risk:  Study Details:  Correct procedure and correct patient verified verbally and with                ID Band checked.  Patient History: Dyslipidemia, hypertension and coronary artery disease. Allergies:     Latex. PCI and Stent: Stent deployed in the RCA on 12/26/2024.  Medications: The patient's prescribed medication is norvasc, aspirin, atorvastatin, benadryl,cozaar, brilinta.  Patient Performance: The patient exercised to stage IV on a Modified Mayra protocol for 6 minutes and 30 seconds, achieving 4 METS. The peak heart rate achieved was 108 bpm, which was 71 % of the age predicted target heart rate of 153 bpm. The resting blood pressure was 116/64 mmHg with a heart rate of 70 bpm. The standing blood pressure was 108/64 mmHg with a heart rate of 70 bpm. The patient developed fatigue and shortness of breath during the stress exam. The blood pressure response was normal. The test was terminated due to: dyspnea and fatigue. Patient has met the discharge criteria and is discharged to home.  Baseline ECG: Resting ECG showed normal sinus rhythm.  Stress ECG: Stress ECG showed sinus tachycardia. No ST changes.  Stress Stage Data: +---+------+-------+----------------------------+ HR Sys BPDias BPComments                     +---+------+-------+----------------------------+ 70 116   64                                  +---+------+-------+----------------------------+ 70 108   64                                  +---+------+-------+----------------------------+ 87 108   64     No symptoms                  +---+------+-------+----------------------------+ 90 112   68     No symptoms                  +---+------+-------+----------------------------+ 108             fatigue, shortness of breath +---+------+-------+----------------------------+  Recovery ECG: Recovery ECG showed normal sinus rhythm.  +------------+---+------+-------+----------------------+             HR Sys BPDias BPComments                "+------------+---+------+-------+----------------------+ Recovery I  [id number]   68     1 minute. dyspnea      +------------+---+------+-------+----------------------+ Recovery II 80 118   64     2 minutes. No symptoms +------------+---+------+-------+----------------------+ Recovery III75 110   60     4 minutes. No symptoms +------------+---+------+-------+----------------------+ Recovery IV 68 102   64     6 minutes. No symptoms +------------+---+------+-------+----------------------+  Summary:  1. No clinical or electrocardiographic evidence for ischemia at a submaximal workload.  2. Submaximal level of stress achieved. [id number] Leonardo Barber MD Electronically signed on 1/10/2025 at 12:11:27 PM   ** Final **       ASSESSMENT and PLAN:  Dorinda Benson is a 69 y.o. female with a PMHx significant for CAD (NSTEMI 12/2024 s/p PCI with JIM to mid RCA, on DAPT with Brilinta and ASA), HTN, HLD, prediabetes (A1c 5.9), and substance use, who presented today for follow-up. Chief complaint of increased anxiety over the past few weeks.       #Anxiety  #Insomnia  ::Per HPI, pt endorsing increased anxiety over the past month  ::MIGEL-7 in office today 16  -Started fluoxetine 20 mg daily  -Instructed pt to take 1/2 pill for 5 days and increase to full pill if going well   -Ok to stay at 1/2 pill if pt feeling like 10 mg is working for her   -Counseled patient on black-boxed warning of increased SI with SSRI   -Could consider bupropion in the future for anxiety and to help quit cigarette use  -Follow-up in 2 weeks with clinic for check in on anxiety and fluoxetine start  -[facility] to [facility], ordered  -Spoke about nightly melatonin use   -Pt will get OTC and trial at home    #Macrocytic anemia  #C/f UGIB 2/2 melena after starting DAPT  :: CBC during recent admission w/ hbg 10.3,               -Suspect 2/2 chronic etoh use  ::Pt endorsing \"dark stools\" started 12/28 - 01/27 following initiation " of DAPT   -Pt states currently resolved, denies any LH/D/SOB   -Was referred to GI clinic on 12/28, but pt not interested in pursuing EGD/Colonoscopy at this time so did not call  -Continue multivitamin  -Repeat CBC, ordered today  -Serum Folate and B12, ordered today     #Alcohol use  #Tobacco use  ::Per HPI, patient endorsing recent increase in alcohol consumption and cigarette use   -2/2 increased feelings of anxiety over the past 1 month  ::Pt states she would like to quit and is interested in help for doing so  -Smoking cessation counseling and encouragement of decreased alcohol use provided in office today  -Referral to Tobacco cessation counseling, ordered  -Thiamine supplementation 100 mg daily, ordered    #HTN  -BP in office today 117/79  -Continue amlodipine 5 mg and losartan 100 mg    #CAD   #HLD  #NSTEMI s/p PCI to RCA 12/2024  ::Most recent lipid panel 12/2024 showing:              -Total 272, HDL 73, , Tri 190  ::Follow with Cards, Dr. Carty, last visits 01/07/25  -Continue ASA 81 mg daily and Brillinta 90mg daily  -Plan is for DAPT x1 year (Dec 2025) then ASA alone for life, per cards  -Continue Atorvastatin 80 mg  -Continue cardiac rehab (12/19 to current)  -Follow up with cardiology 05/13/2025        #Prediabetes  -Hga1c 5.8 12/2024  -Continue lifestyle changes     #Health maintenance  - Last HbA1c: 5.8 12/2024  - ASCVD: 25.9, on high-dose statin therapy  - STOP-BANG: not addressed today  - Infectious:  - Hep C negative 2023   - HIV negative 2024  - Vaccinations:  - Tdap: 5/29/2024   - Flu not addressed today  - VZV: Completed   - PNA: PCV20 in 2023   - Colonoscopy: last done in 2/22/2023, 2-4 polyps resected, recommended repeat colonoscopy in 3 - 5 years for surveillance  (Due 2026 - 2028)   - Pap testing  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 1 year follow up pap w/HPV recommended - OVERDUE 03/2024, mammogram last mammogram 5/29/2024, negative for malignancy (Due 05/2025 for  q1 or 05/2026 for q2)  - Low-dose CT chest: Low dose CT done in August 2024: stable 3mm pulmonary nodules in the left lung, likely benign (Due 08/2025)  - Bone density: last done 4/27/2023   - AAA screening: n/a  Plan:  -Although patient is above pap testing cutoff, due to LSIL hx on most recent PAP, encouraged pt to schedule appointment with OB/GYN for repeat pap smear testing. Pt states she will call to make appointment     Follow-up in 2 weeks for check in, and then again in 4 weeks for in office follow-up.  Patient and plan discussed with attending physician Dr. Ying.    Veronica Bolton MD  Internal Medicine, PGY-1   [facility]

## 2025-01-28 NOTE — PATIENT INSTRUCTIONS
As discussed today, our plan is:     Labs - we collected labs today and will call you with any abnormal results. If you have any questions or concerns prior to us calling feel free to call our office to have your questions addressed.   Medication changes: Start Fluoxetine 20 mg daily (Take half a pill for 5 days, then may increase to one full pill daily). Start Thiamine 100 mg daily.  Consultations - you were referred to see the following specialists: Behavioral health and Tobacco cessation clinic.You should receive a call from central scheduling in the next few days if you do not receive a call within 3-5 business days please call [phone] to schedule your appointment.   4.   If you smoke or use other tobacco products, take steps to quit. Call [phone] for more information or to set up an appointment with  [facility]. The [place] Tobacco Quit Line is a free resource for people who don’t have insurance, receive Medicaid, pregnant women, or members of the [place] [facility]. Call 8-280-QUIT-NOW or 1-550.111.6589.    Please come back to see us in: 2 weeks for a check in, as well as 4 months in clinic follow-up     ------  If you have any problems or questions, please contact the clinic at [phone] to leave a message. Our fax number is [phone]. If your issue cannot wait until the next business day, please go to urgent care or the emergency department.     I also strongly urge all of my patients to register for [employer] by going to: [email or web address]  (The  staff can also send you a text/email link to register when you check out).    No shows: It is understandable if you are unable to make it to a visit, but please cancel your appointment instead of not showing up. This helps to give other patients access to primary care and keeps wait times low.        [facility]   [phone]

## 2025-01-29 ENCOUNTER — TELEPHONE (OUTPATIENT)
Dept: PRIMARY CARE | Facility: CLINIC | Age: 70
End: 2025-01-29
Payer: COMMERCIAL

## 2025-01-29 ENCOUNTER — TELEPHONE (OUTPATIENT)
Dept: INTERNAL MEDICINE | Facility: HOSPITAL | Age: 70
End: 2025-01-29
Payer: COMMERCIAL

## 2025-01-29 ENCOUNTER — APPOINTMENT (OUTPATIENT)
Dept: CARDIAC REHAB | Facility: HOSPITAL | Age: 70
End: 2025-01-29
Payer: COMMERCIAL

## 2025-01-29 ENCOUNTER — HOSPITAL ENCOUNTER (INPATIENT)
Facility: HOSPITAL | Age: 70
LOS: 2 days | Discharge: HOME | DRG: 378 | End: 2025-01-31
Attending: EMERGENCY MEDICINE | Admitting: INTERNAL MEDICINE
Payer: COMMERCIAL

## 2025-01-29 ENCOUNTER — TELEPHONE (OUTPATIENT)
Dept: PRIMARY CARE | Facility: HOSPITAL | Age: 70
End: 2025-01-29
Payer: COMMERCIAL

## 2025-01-29 ENCOUNTER — TELEPHONE (OUTPATIENT)
Dept: CARDIAC REHAB | Facility: HOSPITAL | Age: 70
End: 2025-01-29
Payer: COMMERCIAL

## 2025-01-29 ENCOUNTER — DOCUMENTATION (OUTPATIENT)
Dept: PRIMARY CARE | Facility: CLINIC | Age: 70
End: 2025-01-29
Payer: COMMERCIAL

## 2025-01-29 DIAGNOSIS — Z95.5 STENTED CORONARY ARTERY: ICD-10-CM

## 2025-01-29 DIAGNOSIS — I21.4 NON-ST ELEVATION (NSTEMI) MYOCARDIAL INFARCTION (MULTI): ICD-10-CM

## 2025-01-29 DIAGNOSIS — I21.4 NSTEMI (NON-ST ELEVATED MYOCARDIAL INFARCTION) (MULTI): ICD-10-CM

## 2025-01-29 DIAGNOSIS — D62 ANEMIA DUE TO ACUTE BLOOD LOSS: ICD-10-CM

## 2025-01-29 DIAGNOSIS — K92.1 MELENA: Primary | ICD-10-CM

## 2025-01-29 PROBLEM — D64.9 ANEMIA: Status: ACTIVE | Noted: 2025-01-29

## 2025-01-29 LAB
ABO GROUP (TYPE) IN BLOOD: NORMAL
ABO GROUP (TYPE) IN BLOOD: NORMAL
ALBUMIN SERPL BCP-MCNC: 4.4 G/DL (ref 3.4–5)
ALP SERPL-CCNC: 90 U/L (ref 33–136)
ALT SERPL W P-5'-P-CCNC: 15 U/L (ref 7–45)
ANION GAP SERPL CALC-SCNC: 12 MMOL/L (ref 10–20)
ANTIBODY SCREEN: NORMAL
AST SERPL W P-5'-P-CCNC: 15 U/L (ref 9–39)
BASOPHILS # BLD AUTO: 0.04 X10*3/UL (ref 0–0.1)
BASOPHILS NFR BLD AUTO: 0.8 %
BILIRUB SERPL-MCNC: 0.3 MG/DL (ref 0–1.2)
BUN SERPL-MCNC: 10 MG/DL (ref 6–23)
CALCIUM SERPL-MCNC: 9.6 MG/DL (ref 8.6–10.6)
CHLORIDE SERPL-SCNC: 107 MMOL/L (ref 98–107)
CO2 SERPL-SCNC: 24 MMOL/L (ref 21–32)
CREAT SERPL-MCNC: 0.77 MG/DL (ref 0.5–1.05)
EGFRCR SERPLBLD CKD-EPI 2021: 85 ML/MIN/1.73M*2
EOSINOPHIL # BLD AUTO: 0.1 X10*3/UL (ref 0–0.7)
EOSINOPHIL NFR BLD AUTO: 1.9 %
ERYTHROCYTE [DISTWIDTH] IN BLOOD BY AUTOMATED COUNT: 15.3 % (ref 11.5–14.5)
GLUCOSE SERPL-MCNC: 100 MG/DL (ref 74–99)
HCT VFR BLD AUTO: 22.6 % (ref 36–46)
HGB BLD-MCNC: 7.1 G/DL (ref 12–16)
HGB RETIC QN: 29 PG (ref 28–38)
IMM GRANULOCYTES # BLD AUTO: 0.01 X10*3/UL (ref 0–0.7)
IMM GRANULOCYTES NFR BLD AUTO: 0.2 % (ref 0–0.9)
IMMATURE RETIC FRACTION: 34.8 %
IRON SATN MFR SERPL: 9 % (ref 25–45)
IRON SERPL-MCNC: 35 UG/DL (ref 35–150)
LYMPHOCYTES # BLD AUTO: 1.29 X10*3/UL (ref 1.2–4.8)
LYMPHOCYTES NFR BLD AUTO: 24.8 %
MCH RBC QN AUTO: 32.1 PG (ref 26–34)
MCHC RBC AUTO-ENTMCNC: 31.4 G/DL (ref 32–36)
MCV RBC AUTO: 102 FL (ref 80–100)
MONOCYTES # BLD AUTO: 0.51 X10*3/UL (ref 0.1–1)
MONOCYTES NFR BLD AUTO: 9.8 %
NEUTROPHILS # BLD AUTO: 3.26 X10*3/UL (ref 1.2–7.7)
NEUTROPHILS NFR BLD AUTO: 62.5 %
NRBC BLD-RTO: 0 /100 WBCS (ref 0–0)
PLATELET # BLD AUTO: 492 X10*3/UL (ref 150–450)
POTASSIUM SERPL-SCNC: 4.2 MMOL/L (ref 3.5–5.3)
PROT SERPL-MCNC: 7.3 G/DL (ref 6.4–8.2)
RBC # BLD AUTO: 2.21 X10*6/UL (ref 4–5.2)
RETICS #: 0.15 X10*6/UL (ref 0.02–0.11)
RETICS/RBC NFR AUTO: 6.7 % (ref 0.5–2)
RH FACTOR (ANTIGEN D): NORMAL
RH FACTOR (ANTIGEN D): NORMAL
SODIUM SERPL-SCNC: 139 MMOL/L (ref 136–145)
TIBC SERPL-MCNC: 369 UG/DL (ref 240–445)
UIBC SERPL-MCNC: 334 UG/DL (ref 110–370)
WBC # BLD AUTO: 5.2 X10*3/UL (ref 4.4–11.3)

## 2025-01-29 PROCEDURE — 83550 IRON BINDING TEST: CPT

## 2025-01-29 PROCEDURE — 1210000001 HC SEMI-PRIVATE ROOM DAILY

## 2025-01-29 PROCEDURE — 86900 BLOOD TYPING SEROLOGIC ABO: CPT | Performed by: EMERGENCY MEDICINE

## 2025-01-29 PROCEDURE — 86901 BLOOD TYPING SEROLOGIC RH(D): CPT | Performed by: EMERGENCY MEDICINE

## 2025-01-29 PROCEDURE — 36430 TRANSFUSION BLD/BLD COMPNT: CPT

## 2025-01-29 PROCEDURE — 36415 COLL VENOUS BLD VENIPUNCTURE: CPT

## 2025-01-29 PROCEDURE — 86923 COMPATIBILITY TEST ELECTRIC: CPT

## 2025-01-29 PROCEDURE — 85025 COMPLETE CBC W/AUTO DIFF WBC: CPT | Performed by: EMERGENCY MEDICINE

## 2025-01-29 PROCEDURE — 99285 EMERGENCY DEPT VISIT HI MDM: CPT | Mod: 25 | Performed by: EMERGENCY MEDICINE

## 2025-01-29 PROCEDURE — 85045 AUTOMATED RETICULOCYTE COUNT: CPT

## 2025-01-29 PROCEDURE — 80053 COMPREHEN METABOLIC PANEL: CPT | Performed by: EMERGENCY MEDICINE

## 2025-01-29 PROCEDURE — 36415 COLL VENOUS BLD VENIPUNCTURE: CPT | Performed by: EMERGENCY MEDICINE

## 2025-01-29 PROCEDURE — 2500000004 HC RX 250 GENERAL PHARMACY W/ HCPCS (ALT 636 FOR OP/ED)

## 2025-01-29 PROCEDURE — 99285 EMERGENCY DEPT VISIT HI MDM: CPT | Performed by: EMERGENCY MEDICINE

## 2025-01-29 PROCEDURE — P9016 RBC LEUKOCYTES REDUCED: HCPCS

## 2025-01-29 RX ORDER — PANTOPRAZOLE SODIUM 40 MG/1
40 TABLET, DELAYED RELEASE ORAL
Status: DISCONTINUED | OUTPATIENT
Start: 2025-01-30 | End: 2025-01-29

## 2025-01-29 RX ORDER — PANTOPRAZOLE SODIUM 40 MG/10ML
40 INJECTION, POWDER, LYOPHILIZED, FOR SOLUTION INTRAVENOUS
Status: DISCONTINUED | OUTPATIENT
Start: 2025-01-30 | End: 2025-01-29

## 2025-01-29 RX ORDER — PANTOPRAZOLE SODIUM 40 MG/10ML
40 INJECTION, POWDER, LYOPHILIZED, FOR SOLUTION INTRAVENOUS 2 TIMES DAILY
Status: DISCONTINUED | OUTPATIENT
Start: 2025-01-29 | End: 2025-01-31 | Stop reason: HOSPADM

## 2025-01-29 RX ADMIN — PANTOPRAZOLE SODIUM 40 MG: 40 INJECTION, POWDER, FOR SOLUTION INTRAVENOUS at 22:48

## 2025-01-29 ASSESSMENT — COLUMBIA-SUICIDE SEVERITY RATING SCALE - C-SSRS
1. IN THE PAST MONTH, HAVE YOU WISHED YOU WERE DEAD OR WISHED YOU COULD GO TO SLEEP AND NOT WAKE UP?: NO
6. HAVE YOU EVER DONE ANYTHING, STARTED TO DO ANYTHING, OR PREPARED TO DO ANYTHING TO END YOUR LIFE?: NO
2. HAVE YOU ACTUALLY HAD ANY THOUGHTS OF KILLING YOURSELF?: NO

## 2025-01-29 ASSESSMENT — PAIN - FUNCTIONAL ASSESSMENT: PAIN_FUNCTIONAL_ASSESSMENT: 0-10

## 2025-01-29 ASSESSMENT — PAIN SCALES - GENERAL: PAINLEVEL_OUTOF10: 0 - NO PAIN

## 2025-01-29 NOTE — TELEPHONE ENCOUNTER
Called Ms. Benson just after 1 PM to confirm the plan of her going to the ER today for her low hemoglobin (6.6) revealed on labs that were performed after her clinic visit yesterday.    Ms. Benson stated that she is doing okay and is currently getting ready to present to the ER as we spoke. She did not have any questions at this time. I advised her to call the clinic if she should experience any issues once she gets to the ED this afternoon.    Veronica Bolton MD  Internal Medicine, PGY-1   01/29/25 1:06 PM

## 2025-01-29 NOTE — PROGRESS NOTES
Patient referred by primary care.  Has been ensuring melena and outpatient labs show hemoglobin of 6.4.  Is on Brilinta and aspirin.

## 2025-01-29 NOTE — PROGRESS NOTES
Patient hemoglobin came back at 6.6 in setting of melena and dual platelet therapy for her stent/CAD.  Contacted the patient.  The hospital is completely full and the transfer center anticipates a direct admission would not have a bed available for 2-3 days on a med/surgery floor.  Recommend sending to the ER.  I spoke to Ms. Benson and she is amenable to going to the ER understanding that we need to figure out the source of her bleeding, find a way to have her safely continue the blood thinners to protect her stent and heart.  She also needs more blood.  Contacted Dr. Santoyo in the ER to discuss her coming in to the ER.

## 2025-01-29 NOTE — ED TRIAGE NOTES
Pt Hemoglobin 6.6 from outpatient labs, sent here by PCP to get blood transfusion and find source of hemoglobin loss. Pt denies all complaints and is AOX4.

## 2025-01-29 NOTE — TELEPHONE ENCOUNTER
PATIENT: Dorinda Benson  DATE: 1/29/2025    Dr. Ying reached out to [facility] to relay that Dorinda would not be able to attend Cardiac Rehab today because patient is being sent to the ED for a hemoglobin of 6.6. Spoke to patient stating rehab staff is aware of her absence and will follow course of care.     Inna Edouard RN

## 2025-01-29 NOTE — TELEPHONE ENCOUNTER
Yovana from [facility] called to alert provider of results needing immediate attention. She will also fax over results.       Yovana: [phone]    Ref #: [id number]

## 2025-01-30 ENCOUNTER — ANESTHESIA EVENT (OUTPATIENT)
Dept: GASTROENTEROLOGY | Facility: HOSPITAL | Age: 70
End: 2025-01-30
Payer: COMMERCIAL

## 2025-01-30 ENCOUNTER — APPOINTMENT (OUTPATIENT)
Dept: GASTROENTEROLOGY | Facility: HOSPITAL | Age: 70
DRG: 378 | End: 2025-01-30
Payer: COMMERCIAL

## 2025-01-30 ENCOUNTER — ANESTHESIA (OUTPATIENT)
Dept: GASTROENTEROLOGY | Facility: HOSPITAL | Age: 70
End: 2025-01-30
Payer: COMMERCIAL

## 2025-01-30 PROBLEM — Z95.5 STENTED CORONARY ARTERY: Status: ACTIVE | Noted: 2025-01-30

## 2025-01-30 PROBLEM — Z79.01 ANTICOAGULANT LONG-TERM USE: Status: ACTIVE | Noted: 2025-01-30

## 2025-01-30 LAB
ALBUMIN SERPL BCP-MCNC: 4.3 G/DL (ref 3.4–5)
ALP SERPL-CCNC: 84 U/L (ref 33–136)
ALT SERPL W P-5'-P-CCNC: 14 U/L (ref 7–45)
ANION GAP SERPL CALC-SCNC: 14 MMOL/L (ref 10–20)
AST SERPL W P-5'-P-CCNC: 16 U/L (ref 9–39)
BASOPHILS # BLD AUTO: 0.04 X10*3/UL (ref 0–0.1)
BASOPHILS # BLD AUTO: 0.05 X10*3/UL (ref 0–0.1)
BASOPHILS NFR BLD AUTO: 0.5 %
BASOPHILS NFR BLD AUTO: 0.6 %
BILIRUB SERPL-MCNC: 0.7 MG/DL (ref 0–1.2)
BLOOD EXPIRATION DATE: NORMAL
BUN SERPL-MCNC: 9 MG/DL (ref 6–23)
CALCIUM SERPL-MCNC: 9.7 MG/DL (ref 8.6–10.6)
CHLORIDE SERPL-SCNC: 106 MMOL/L (ref 98–107)
CO2 SERPL-SCNC: 24 MMOL/L (ref 21–32)
CREAT SERPL-MCNC: 0.6 MG/DL (ref 0.5–1.05)
DISPENSE STATUS: NORMAL
EGFRCR SERPLBLD CKD-EPI 2021: >90 ML/MIN/1.73M*2
EOSINOPHIL # BLD AUTO: 0.06 X10*3/UL (ref 0–0.7)
EOSINOPHIL # BLD AUTO: 0.14 X10*3/UL (ref 0–0.7)
EOSINOPHIL NFR BLD AUTO: 0.7 %
EOSINOPHIL NFR BLD AUTO: 1.9 %
ERYTHROCYTE [DISTWIDTH] IN BLOOD BY AUTOMATED COUNT: 16.8 % (ref 11.5–14.5)
ERYTHROCYTE [DISTWIDTH] IN BLOOD BY AUTOMATED COUNT: 17.3 % (ref 11.5–14.5)
GLUCOSE SERPL-MCNC: 87 MG/DL (ref 74–99)
HCT VFR BLD AUTO: 23.6 % (ref 36–46)
HCT VFR BLD AUTO: 26.3 % (ref 36–46)
HGB BLD-MCNC: 7.9 G/DL (ref 12–16)
HGB BLD-MCNC: 8.5 G/DL (ref 12–16)
IMM GRANULOCYTES # BLD AUTO: 0.02 X10*3/UL (ref 0–0.7)
IMM GRANULOCYTES # BLD AUTO: 0.03 X10*3/UL (ref 0–0.7)
IMM GRANULOCYTES NFR BLD AUTO: 0.3 % (ref 0–0.9)
IMM GRANULOCYTES NFR BLD AUTO: 0.3 % (ref 0–0.9)
LYMPHOCYTES # BLD AUTO: 1.26 X10*3/UL (ref 1.2–4.8)
LYMPHOCYTES # BLD AUTO: 2.08 X10*3/UL (ref 1.2–4.8)
LYMPHOCYTES NFR BLD AUTO: 13.9 %
LYMPHOCYTES NFR BLD AUTO: 27.5 %
MAGNESIUM SERPL-MCNC: 1.99 MG/DL (ref 1.6–2.4)
MCH RBC QN AUTO: 31 PG (ref 26–34)
MCH RBC QN AUTO: 31.7 PG (ref 26–34)
MCHC RBC AUTO-ENTMCNC: 32.3 G/DL (ref 32–36)
MCHC RBC AUTO-ENTMCNC: 33.5 G/DL (ref 32–36)
MCV RBC AUTO: 95 FL (ref 80–100)
MCV RBC AUTO: 96 FL (ref 80–100)
MONOCYTES # BLD AUTO: 0.62 X10*3/UL (ref 0.1–1)
MONOCYTES # BLD AUTO: 0.67 X10*3/UL (ref 0.1–1)
MONOCYTES NFR BLD AUTO: 7.4 %
MONOCYTES NFR BLD AUTO: 8.2 %
NEUTROPHILS # BLD AUTO: 4.66 X10*3/UL (ref 1.2–7.7)
NEUTROPHILS # BLD AUTO: 6.98 X10*3/UL (ref 1.2–7.7)
NEUTROPHILS NFR BLD AUTO: 61.6 %
NEUTROPHILS NFR BLD AUTO: 77.1 %
NRBC BLD-RTO: 0 /100 WBCS (ref 0–0)
NRBC BLD-RTO: 0 /100 WBCS (ref 0–0)
PLATELET # BLD AUTO: 421 X10*3/UL (ref 150–450)
PLATELET # BLD AUTO: 465 X10*3/UL (ref 150–450)
POTASSIUM SERPL-SCNC: 3.8 MMOL/L (ref 3.5–5.3)
PRODUCT BLOOD TYPE: 5100
PRODUCT CODE: NORMAL
PROT SERPL-MCNC: 7.3 G/DL (ref 6.4–8.2)
RBC # BLD AUTO: 2.49 X10*6/UL (ref 4–5.2)
RBC # BLD AUTO: 2.74 X10*6/UL (ref 4–5.2)
SODIUM SERPL-SCNC: 140 MMOL/L (ref 136–145)
UNIT ABO: NORMAL
UNIT NUMBER: NORMAL
UNIT RH: NORMAL
UNIT VOLUME: 350
WBC # BLD AUTO: 7.6 X10*3/UL (ref 4.4–11.3)
WBC # BLD AUTO: 9.1 X10*3/UL (ref 4.4–11.3)
XM INTEP: NORMAL

## 2025-01-30 PROCEDURE — 2500000001 HC RX 250 WO HCPCS SELF ADMINISTERED DRUGS (ALT 637 FOR MEDICARE OP)

## 2025-01-30 PROCEDURE — 80053 COMPREHEN METABOLIC PANEL: CPT

## 2025-01-30 PROCEDURE — 3700000002 HC GENERAL ANESTHESIA TIME - EACH INCREMENTAL 1 MINUTE

## 2025-01-30 PROCEDURE — 0W3P8ZZ CONTROL BLEEDING IN GASTROINTESTINAL TRACT, VIA NATURAL OR ARTIFICIAL OPENING ENDOSCOPIC: ICD-10-PCS | Performed by: STUDENT IN AN ORGANIZED HEALTH CARE EDUCATION/TRAINING PROGRAM

## 2025-01-30 PROCEDURE — 99222 1ST HOSP IP/OBS MODERATE 55: CPT | Performed by: STUDENT IN AN ORGANIZED HEALTH CARE EDUCATION/TRAINING PROGRAM

## 2025-01-30 PROCEDURE — 36415 COLL VENOUS BLD VENIPUNCTURE: CPT

## 2025-01-30 PROCEDURE — 43255 EGD CONTROL BLEEDING ANY: CPT | Performed by: STUDENT IN AN ORGANIZED HEALTH CARE EDUCATION/TRAINING PROGRAM

## 2025-01-30 PROCEDURE — 2500000005 HC RX 250 GENERAL PHARMACY W/O HCPCS

## 2025-01-30 PROCEDURE — 7100000009 HC PHASE TWO TIME - INITIAL BASE CHARGE

## 2025-01-30 PROCEDURE — 2720000007 HC OR 272 NO HCPCS

## 2025-01-30 PROCEDURE — 85025 COMPLETE CBC W/AUTO DIFF WBC: CPT

## 2025-01-30 PROCEDURE — 30233N1 TRANSFUSION OF NONAUTOLOGOUS RED BLOOD CELLS INTO PERIPHERAL VEIN, PERCUTANEOUS APPROACH: ICD-10-PCS | Performed by: INTERNAL MEDICINE

## 2025-01-30 PROCEDURE — 7100000010 HC PHASE TWO TIME - EACH INCREMENTAL 1 MINUTE

## 2025-01-30 PROCEDURE — 83735 ASSAY OF MAGNESIUM: CPT

## 2025-01-30 PROCEDURE — 1210000001 HC SEMI-PRIVATE ROOM DAILY

## 2025-01-30 PROCEDURE — 2500000002 HC RX 250 W HCPCS SELF ADMINISTERED DRUGS (ALT 637 FOR MEDICARE OP, ALT 636 FOR OP/ED)

## 2025-01-30 PROCEDURE — 3700000001 HC GENERAL ANESTHESIA TIME - INITIAL BASE CHARGE

## 2025-01-30 PROCEDURE — 99222 1ST HOSP IP/OBS MODERATE 55: CPT

## 2025-01-30 PROCEDURE — 2500000004 HC RX 250 GENERAL PHARMACY W/ HCPCS (ALT 636 FOR OP/ED)

## 2025-01-30 PROCEDURE — 2500000004 HC RX 250 GENERAL PHARMACY W/ HCPCS (ALT 636 FOR OP/ED): Mod: TB

## 2025-01-30 RX ORDER — PROPOFOL 10 MG/ML
INJECTION, EMULSION INTRAVENOUS CONTINUOUS PRN
Status: DISCONTINUED | OUTPATIENT
Start: 2025-01-30 | End: 2025-01-30

## 2025-01-30 RX ORDER — LIDOCAINE HYDROCHLORIDE 10 MG/ML
0.1 INJECTION, SOLUTION INFILTRATION; PERINEURAL ONCE
OUTPATIENT
Start: 2025-01-30 | End: 2025-01-30

## 2025-01-30 RX ORDER — LOSARTAN POTASSIUM 25 MG/1
100 TABLET ORAL DAILY
Status: DISCONTINUED | OUTPATIENT
Start: 2025-01-30 | End: 2025-01-31 | Stop reason: HOSPADM

## 2025-01-30 RX ORDER — ACETAMINOPHEN 325 MG/1
650 TABLET ORAL EVERY 4 HOURS PRN
OUTPATIENT
Start: 2025-01-30

## 2025-01-30 RX ORDER — SODIUM CHLORIDE, SODIUM LACTATE, POTASSIUM CHLORIDE, CALCIUM CHLORIDE 600; 310; 30; 20 MG/100ML; MG/100ML; MG/100ML; MG/100ML
100 INJECTION, SOLUTION INTRAVENOUS CONTINUOUS
OUTPATIENT
Start: 2025-01-30 | End: 2025-01-31

## 2025-01-30 RX ORDER — ESMOLOL HYDROCHLORIDE 10 MG/ML
INJECTION INTRAVENOUS AS NEEDED
Status: DISCONTINUED | OUTPATIENT
Start: 2025-01-30 | End: 2025-01-30

## 2025-01-30 RX ORDER — ONDANSETRON HYDROCHLORIDE 2 MG/ML
4 INJECTION, SOLUTION INTRAVENOUS ONCE AS NEEDED
OUTPATIENT
Start: 2025-01-30

## 2025-01-30 RX ORDER — CLOPIDOGREL BISULFATE 75 MG/1
75 TABLET ORAL DAILY
Status: DISCONTINUED | OUTPATIENT
Start: 2025-02-01 | End: 2025-01-31 | Stop reason: HOSPADM

## 2025-01-30 RX ORDER — SODIUM CHLORIDE, SODIUM LACTATE, POTASSIUM CHLORIDE, CALCIUM CHLORIDE 600; 310; 30; 20 MG/100ML; MG/100ML; MG/100ML; MG/100ML
INJECTION, SOLUTION INTRAVENOUS CONTINUOUS PRN
Status: DISCONTINUED | OUTPATIENT
Start: 2025-01-30 | End: 2025-01-30

## 2025-01-30 RX ORDER — LIDOCAINE HCL/PF 100 MG/5ML
SYRINGE (ML) INTRAVENOUS AS NEEDED
Status: DISCONTINUED | OUTPATIENT
Start: 2025-01-30 | End: 2025-01-30

## 2025-01-30 RX ORDER — ATORVASTATIN CALCIUM 80 MG/1
80 TABLET, FILM COATED ORAL NIGHTLY
Status: DISCONTINUED | OUTPATIENT
Start: 2025-01-30 | End: 2025-01-31 | Stop reason: HOSPADM

## 2025-01-30 RX ORDER — AMLODIPINE BESYLATE 5 MG/1
5 TABLET ORAL DAILY
Status: DISCONTINUED | OUTPATIENT
Start: 2025-01-30 | End: 2025-01-31 | Stop reason: HOSPADM

## 2025-01-30 RX ORDER — ASPIRIN 81 MG/1
81 TABLET ORAL DAILY
Status: DISCONTINUED | OUTPATIENT
Start: 2025-01-31 | End: 2025-01-30

## 2025-01-30 RX ORDER — POTASSIUM CHLORIDE 20 MEQ/1
40 TABLET, EXTENDED RELEASE ORAL ONCE
Status: DISCONTINUED | OUTPATIENT
Start: 2025-01-30 | End: 2025-01-31 | Stop reason: HOSPADM

## 2025-01-30 RX ORDER — CLOPIDOGREL BISULFATE 75 MG/1
600 TABLET ORAL ONCE
Status: COMPLETED | OUTPATIENT
Start: 2025-01-31 | End: 2025-01-31

## 2025-01-30 RX ORDER — FLUOXETINE HYDROCHLORIDE 20 MG/1
20 CAPSULE ORAL DAILY
Status: DISCONTINUED | OUTPATIENT
Start: 2025-01-30 | End: 2025-01-31 | Stop reason: HOSPADM

## 2025-01-30 RX ORDER — KETAMINE HYDROCHLORIDE 10 MG/ML
INJECTION, SOLUTION INTRAMUSCULAR; INTRAVENOUS AS NEEDED
Status: DISCONTINUED | OUTPATIENT
Start: 2025-01-30 | End: 2025-01-30

## 2025-01-30 RX ORDER — ASPIRIN 81 MG/1
81 TABLET ORAL DAILY
Status: DISCONTINUED | OUTPATIENT
Start: 2025-01-30 | End: 2025-01-30

## 2025-01-30 RX ORDER — LANOLIN ALCOHOL/MO/W.PET/CERES
100 CREAM (GRAM) TOPICAL DAILY
Status: DISCONTINUED | OUTPATIENT
Start: 2025-01-30 | End: 2025-01-31 | Stop reason: HOSPADM

## 2025-01-30 RX ORDER — DIPHENHYDRAMINE HCL 25 MG
25 CAPSULE ORAL NIGHTLY PRN
Status: DISCONTINUED | OUTPATIENT
Start: 2025-01-30 | End: 2025-01-31 | Stop reason: HOSPADM

## 2025-01-30 RX ADMIN — ESMOLOL HYDROCHLORIDE 20 MG: 100 INJECTION, SOLUTION INTRAVENOUS at 12:27

## 2025-01-30 RX ADMIN — ATORVASTATIN CALCIUM 80 MG: 80 TABLET, FILM COATED ORAL at 21:10

## 2025-01-30 RX ADMIN — LIDOCAINE HYDROCHLORIDE 80 MG: 20 INJECTION INTRAVENOUS at 12:24

## 2025-01-30 RX ADMIN — PROPOFOL 20 MG: 10 INJECTION, EMULSION INTRAVENOUS at 12:36

## 2025-01-30 RX ADMIN — THIAMINE HCL TAB 100 MG 100 MG: 100 TAB at 08:21

## 2025-01-30 RX ADMIN — DIPHENHYDRAMINE HYDROCHLORIDE 25 MG: 25 CAPSULE ORAL at 08:21

## 2025-01-30 RX ADMIN — TICAGRELOR 90 MG: 90 TABLET ORAL at 02:02

## 2025-01-30 RX ADMIN — ESMOLOL HYDROCHLORIDE 20 MG: 100 INJECTION, SOLUTION INTRAVENOUS at 12:36

## 2025-01-30 RX ADMIN — PANTOPRAZOLE SODIUM 40 MG: 40 INJECTION, POWDER, FOR SOLUTION INTRAVENOUS at 08:22

## 2025-01-30 RX ADMIN — Medication 10 MG: at 12:39

## 2025-01-30 RX ADMIN — PANTOPRAZOLE SODIUM 40 MG: 40 INJECTION, POWDER, FOR SOLUTION INTRAVENOUS at 21:10

## 2025-01-30 RX ADMIN — PROPOFOL 75 MCG/KG/MIN: 10 INJECTION, EMULSION INTRAVENOUS at 12:24

## 2025-01-30 RX ADMIN — AMLODIPINE BESYLATE 5 MG: 5 TABLET ORAL at 08:21

## 2025-01-30 RX ADMIN — LOSARTAN POTASSIUM 100 MG: 50 TABLET, FILM COATED ORAL at 08:21

## 2025-01-30 RX ADMIN — ASPIRIN 81 MG: 81 TABLET, COATED ORAL at 08:21

## 2025-01-30 RX ADMIN — PROPOFOL 20 MG: 10 INJECTION, EMULSION INTRAVENOUS at 12:59

## 2025-01-30 RX ADMIN — Medication 30 MG: at 12:24

## 2025-01-30 RX ADMIN — SODIUM CHLORIDE, POTASSIUM CHLORIDE, SODIUM LACTATE AND CALCIUM CHLORIDE: 600; 310; 30; 20 INJECTION, SOLUTION INTRAVENOUS at 12:18

## 2025-01-30 RX ADMIN — ATORVASTATIN CALCIUM 80 MG: 80 TABLET, FILM COATED ORAL at 02:02

## 2025-01-30 RX ADMIN — PROPOFOL 40 MG: 10 INJECTION, EMULSION INTRAVENOUS at 12:25

## 2025-01-30 RX ADMIN — PROPOFOL 20 MG: 10 INJECTION, EMULSION INTRAVENOUS at 12:35

## 2025-01-30 RX ADMIN — FLUOXETINE HYDROCHLORIDE 20 MG: 20 CAPSULE ORAL at 08:21

## 2025-01-30 RX ADMIN — BENZOCAINE, BUTAMBEN, AND TETRACAINE HYDROCHLORIDE 1 SPRAY: .028; .004; .004 AEROSOL, SPRAY TOPICAL at 12:18

## 2025-01-30 SDOH — SOCIAL STABILITY: SOCIAL INSECURITY: WITHIN THE LAST YEAR, HAVE YOU BEEN AFRAID OF YOUR PARTNER OR EX-PARTNER?: NO

## 2025-01-30 SDOH — SOCIAL STABILITY: SOCIAL INSECURITY: ARE THERE ANY APPARENT SIGNS OF INJURIES/BEHAVIORS THAT COULD BE RELATED TO ABUSE/NEGLECT?: NO

## 2025-01-30 SDOH — ECONOMIC STABILITY: FOOD INSECURITY: WITHIN THE PAST 12 MONTHS, YOU WORRIED THAT YOUR FOOD WOULD RUN OUT BEFORE YOU GOT THE MONEY TO BUY MORE.: NEVER TRUE

## 2025-01-30 SDOH — ECONOMIC STABILITY: FOOD INSECURITY: WITHIN THE PAST 12 MONTHS, THE FOOD YOU BOUGHT JUST DIDN'T LAST AND YOU DIDN'T HAVE MONEY TO GET MORE.: NEVER TRUE

## 2025-01-30 SDOH — SOCIAL STABILITY: SOCIAL INSECURITY: HAVE YOU HAD THOUGHTS OF HARMING ANYONE ELSE?: NO

## 2025-01-30 SDOH — ECONOMIC STABILITY: INCOME INSECURITY: IN THE PAST 12 MONTHS HAS THE ELECTRIC, GAS, OIL, OR WATER COMPANY THREATENED TO SHUT OFF SERVICES IN YOUR HOME?: YES

## 2025-01-30 SDOH — SOCIAL STABILITY: SOCIAL INSECURITY: ABUSE: ADULT

## 2025-01-30 SDOH — SOCIAL STABILITY: SOCIAL INSECURITY: ARE YOU OR HAVE YOU BEEN THREATENED OR ABUSED PHYSICALLY, EMOTIONALLY, OR SEXUALLY BY ANYONE?: NO

## 2025-01-30 SDOH — SOCIAL STABILITY: SOCIAL INSECURITY: HAS ANYONE EVER THREATENED TO HURT YOUR FAMILY OR YOUR PETS?: NO

## 2025-01-30 SDOH — SOCIAL STABILITY: SOCIAL INSECURITY: WITHIN THE LAST YEAR, HAVE YOU BEEN HUMILIATED OR EMOTIONALLY ABUSED IN OTHER WAYS BY YOUR PARTNER OR EX-PARTNER?: NO

## 2025-01-30 SDOH — SOCIAL STABILITY: SOCIAL INSECURITY: WERE YOU ABLE TO COMPLETE ALL THE BEHAVIORAL HEALTH SCREENINGS?: YES

## 2025-01-30 SDOH — SOCIAL STABILITY: SOCIAL INSECURITY: DO YOU FEEL UNSAFE GOING BACK TO THE PLACE WHERE YOU ARE LIVING?: NO

## 2025-01-30 SDOH — SOCIAL STABILITY: SOCIAL INSECURITY: DOES ANYONE TRY TO KEEP YOU FROM HAVING/CONTACTING OTHER FRIENDS OR DOING THINGS OUTSIDE YOUR HOME?: NO

## 2025-01-30 SDOH — SOCIAL STABILITY: SOCIAL INSECURITY: DO YOU FEEL ANYONE HAS EXPLOITED OR TAKEN ADVANTAGE OF YOU FINANCIALLY OR OF YOUR PERSONAL PROPERTY?: NO

## 2025-01-30 ASSESSMENT — PAIN SCALES - GENERAL
PAINLEVEL_OUTOF10: 0 - NO PAIN

## 2025-01-30 ASSESSMENT — COGNITIVE AND FUNCTIONAL STATUS - GENERAL
CLIMB 3 TO 5 STEPS WITH RAILING: A LITTLE
WALKING IN HOSPITAL ROOM: A LITTLE
PATIENT BASELINE BEDBOUND: NO
DAILY ACTIVITIY SCORE: 24
MOBILITY SCORE: 22

## 2025-01-30 ASSESSMENT — LIFESTYLE VARIABLES
AUDIT-C TOTAL SCORE: 5
AUDIT-C TOTAL SCORE: 5
SKIP TO QUESTIONS 9-10: 0
HOW OFTEN DO YOU HAVE A DRINK CONTAINING ALCOHOL: 4 OR MORE TIMES A WEEK
HOW MANY STANDARD DRINKS CONTAINING ALCOHOL DO YOU HAVE ON A TYPICAL DAY: 3 OR 4
HOW OFTEN DO YOU HAVE 6 OR MORE DRINKS ON ONE OCCASION: NEVER

## 2025-01-30 ASSESSMENT — ENCOUNTER SYMPTOMS
TROUBLE SWALLOWING: 0
DYSURIA: 0
FEVER: 0
RECTAL PAIN: 0
DIARRHEA: 0
SHORTNESS OF BREATH: 0
ABDOMINAL DISTENTION: 0
NAUSEA: 0
ABDOMINAL PAIN: 0
CHILLS: 0
ARTHRALGIAS: 0
CHEST TIGHTNESS: 0

## 2025-01-30 ASSESSMENT — ACTIVITIES OF DAILY LIVING (ADL)
GROOMING: INDEPENDENT
BATHING: INDEPENDENT
DRESSING YOURSELF: INDEPENDENT
WALKS IN HOME: INDEPENDENT
ADEQUATE_TO_COMPLETE_ADL: YES
FEEDING YOURSELF: INDEPENDENT
JUDGMENT_ADEQUATE_SAFELY_COMPLETE_DAILY_ACTIVITIES: YES
ASSISTIVE_DEVICE: EYEGLASSES
HEARING - RIGHT EAR: FUNCTIONAL
LACK_OF_TRANSPORTATION: NO
PATIENT'S MEMORY ADEQUATE TO SAFELY COMPLETE DAILY ACTIVITIES?: YES
HEARING - LEFT EAR: FUNCTIONAL
TOILETING: INDEPENDENT

## 2025-01-30 ASSESSMENT — PAIN - FUNCTIONAL ASSESSMENT
PAIN_FUNCTIONAL_ASSESSMENT: 0-10

## 2025-01-30 NOTE — H&P
History Of Present Illness  Dorinda Benson is a 69 y.o. female presenting with 1mo of melena. She states her last melena stool was Monday. She is currently taking DAPT (asa81, brillinta) for her recent PCI in 12/2024.     Past Medical History  Past Medical History:   Diagnosis Date    Asthma     COPD (chronic obstructive pulmonary disease) (Multi)     HLD (hyperlipidemia)     Hypertension      Surgical History  Past Surgical History:   Procedure Laterality Date    CARDIAC CATHETERIZATION N/A 12/6/2024    Procedure: Left Heart Cath with Coronary Angiography and LV;  Surgeon: Carli Calvert MD;  Location: [place] [address] Cardiac Cath Lab;  Service: Cardiovascular;  Laterality: N/A;    HYSTERECTOMY       Social History  She reports that she has been smoking cigarettes. She has never used smokeless tobacco. She reports that she does not currently use alcohol. She reports current drug use. Drug: Marijuana.    Family History  Family History   Problem Relation Name Age of Onset    Dementia Mother      Alcohol abuse Brother      Other (bowel obstruction) Brother      Alcohol abuse Other          Allergies  Allergies   Allergen Reactions    Aspirin Other     Per provider, patient gets upset stomach when taking aspirin    Latex Hives and Itching     Review of Systems   Constitutional:  Negative for chills and fever.   HENT:  Negative for trouble swallowing.    Respiratory:  Negative for chest tightness and shortness of breath.    Cardiovascular:  Negative for chest pain.   Gastrointestinal:  Negative for abdominal distention, abdominal pain, diarrhea, nausea and rectal pain.   Genitourinary:  Negative for dysuria.   Musculoskeletal:  Negative for arthralgias.        Physical Exam  Constitutional:       Appearance: Normal appearance.   HENT:      Head: Normocephalic and atraumatic.      Mouth/Throat:      Mouth: Mucous membranes are moist.   Eyes:      Extraocular Movements: Extraocular movements intact.   Cardiovascular:       "Rate and Rhythm: Normal rate and regular rhythm.   Pulmonary:      Effort: Pulmonary effort is normal.      Breath sounds: Normal breath sounds.   Abdominal:      General: Abdomen is flat.      Palpations: Abdomen is soft.   Musculoskeletal:         General: Normal range of motion.      Cervical back: Normal range of motion.   Skin:     General: Skin is warm and dry.   Neurological:      General: No focal deficit present.      Mental Status: She is alert and oriented to person, place, and time.          Last Recorded Vitals  Blood pressure 155/77, pulse 64, temperature 36.2 °C (97.2 °F), resp. rate 16, height 1.626 m (5' 4\"), weight 73.9 kg (163 lb), SpO2 98%.    Assessment/Plan   Proceed with EGD today.     Katelynn Brown MD  "

## 2025-01-30 NOTE — ED PROVIDER NOTES
History of Present Illness     History provided by: Patient and chart review  Limitations to History: None  External Records Reviewed with Brief Summary: Outpatient Labs from  which showed Hgb 6.6    HPI:  Dorinda Benson is a 69 y.o. female with a PMHx significant for CAD (NSTEMI 2024 s/p PCI with JIM to mid RCA, on DAPT with Brilinta and ASA), HTN, HLD, prediabetes (A1c 5.9), and substance use who presents to the ED as a referral from her primary care doctor for low hemoglobin of 6.6. She reports dark stools for the past month since starting aspirin (last reported one was on ) but had a normal BM yesterday. She denies bright red bloody bowel movements, blood in the urine, gingival bleeds, nosebleeds, petechial rashes, or abnormal bruising. She does endorse lightheadedness and shortness of breath but denies chest pain, fever, chills, abdominal pain, nausea, and vomiting.    Physical Exam   Triage vitals:  T 36.1 °C (97 °F)  HR 71  /68  RR 16  O2 100 % None (Room air)    General: Awake, alert, in no acute distress  Eyes: Gaze conjugate.  No scleral icterus or injection  HENT: Normo-cephalic, atraumatic. No stridor. Dry mucous membranes  CV: Regular rate, regular rhythm. Radial pulses 2+ bilaterally  Resp: Breathing non-labored, speaking in full sentences.  Clear to auscultation bilaterally  GI: Soft, non-distended, non-tender. No rebound or guarding.  MSK/Extremities: No gross bony deformities. Moving all extremities  Skin: Warm. Appropriate color  Neuro: Alert. Oriented. Face symmetric. Speech is fluent.  Gross strength and sensation intact in b/l UE and LEs  Psych: Appropriate mood and affect      Medical Decision Making & ED Course   Medical Decision Makin y.o. female with a PMHx significant for CAD (NSTEMI 2024 s/p PCI with JIM to mid RCA, on DAPT with Brilinta and ASA) who presents to the ED as a referral from her primary care doctor for low hemoglobin of 6.6. Repeat CBC showed Hgb  7.1, , with normal Iron/TIBC, elevated retic % demonstrating compensation, and normal bilirubin without concern for hemolysis. Patient will be given 1 unit of blood and admitted to medicine for GI evaluation.  ----      Differential diagnoses considered include but are not limited to: diverticular bleed, colonic AVM, internal hemorrhoids, gastric/duodenal ulcer     Social Determinants of Health which Significantly Impact Care: None identified     EKG Independent Interpretation:  NSR    Independent Result Review and Interpretation: Relevant laboratory and radiographic results were reviewed and independently interpreted by myself.  As necessary, they are commented on in the ED Course.    Chronic conditions affecting the patient's care: As documented above in MDM    The patient was discussed with the following consultants/services: Admission Coordinator who accepted the patient for admission    Care Considerations: As documented above in [facility]    ED Course:  Diagnoses as of 01/29/25 2121   Anemia     Disposition     Admitted to medicine    Procedures   Procedures    None    Patient seen and discussed with ED attending physician Dr. Isaac Dueñas, PGY1     Fernando Dueñas MD  Resident  01/29/25 2152

## 2025-01-30 NOTE — HOSPITAL COURSE
Dorinda Benson is a 69 year old female with a PMHx of CAD (NSTEMI 12/2024 s/p PCI with JIM to mid RCA, on DAPT with Brilinta and ASA), HTN, DLD, preDM, diverticulosis, tobacco and alcohol use, and remote h/o cocaine use who presented to ED at PCP recommendation for symptomatic anemia and melena.     Pt had reported recurrent melena since initiating DAPT after her PCI in December. She received one unit of pRBC's in the ED with Hb 7.1 on admission, incremented to 8.5 without recurrent melena. Remained HDS. Pt underwent EGD on 1/30 showing bleeding AVM which required APC for hemostasis after clips x3 failed to achieve hemostasis. She was continued on brillinta and asa while admitted given recent JIM. Cardiology consulted, recommending discontinuing both brillinta and aspirin, and starting plavix monotherapy with indefinite PPI use. Pt was loaded with plavix dose on 1/31 and discharged on 1/31 with 75 mg plavix daily, as well as pantoprazole daily which will be indefinite.   Referral was placed to follow up with Soto Valdovinos in 2-3 weeks. She will also be seen in [facility] resident clinic in early February. Order also placed for repeat CBC in one week post discharge.

## 2025-01-30 NOTE — PROGRESS NOTES
"  MEDICINE INPATIENT PROGRESS NOTE    Dorinda Benson is a 69 y.o. female on hospital day 1    Subjective   No acute events overnight.  Upon evaluation this AM in the ED hallway, she states that she has been having regular black bowel movements since she started taking her DAPT following her heart stent in December. She denies any melena overnight, abd pain, n/v, hematemesis, diarrhea, lightheadedness, dizziness.             Objective     Last Recorded Vitals  Blood pressure 133/63, pulse 63, temperature 36.2 °C (97.2 °F), resp. rate 15, height 1.626 m (5' 4\"), weight 73.9 kg (163 lb), SpO2 100%.    Vital Trends Last 24hrs  Temperature:  [36.1 °C (97 °F)-36.9 °C (98.4 °F)] 36.2 °C (97.2 °F)  Heart Rate:  [62-71] 63  Respirations:  [15-18] 15  BP: (116-155)/(63-79) 133/63     Intake/Output last 3 Shifts:  I/O last 3 completed shifts:  In: 343.3 (4.6 mL/kg) [Blood:343.3]  Out: - (0 mL/kg)   Weight: 73.9 kg     Physical Exam    General: Alert and interactive, in no acute distress, on ambient air  HEENT: NC/AT, EOMI, no conjunctival icterus, mucosa   Cardiovascular: Regular rate & rhythm, no murmurs  Pulmonary: Lungs CTA bilaterally. Normal work of breathing. No wheezes, rales, or rhonchi  GI/Abd: Normoactive bowel sounds. No abd distention or TTP. No organomegaly  Extremities: No peripheral edema  Skin: No jaundice, rashes. No wounds  Neuro: Alert and oriented x3. No focal deficit.   Psych: Appropriate mood and affect      Relevant Results  Results from last 7 days   Lab Units 01/30/25  0444 01/29/25  1442 01/28/25  1608   WBC AUTO x10*3/uL 7.6 5.2  --    [facility] WBC AUTO Thousand/uL  --   --  5.8   HEMOGLOBIN g/dL 8.5* 7.1*  --    [facility] HEMOGLOBIN g/dL  --   --  6.6*   HEMATOCRIT % 26.3* 22.6*  --    [facility] HEMATOCRIT %  --   --  22.0*   PLATELETS AUTO x10*3/uL 465* 492*  --    [facility] PLATELETS AUTO Thousand/uL  --   --  470*     Results from last 7 days   Lab Units 01/30/25  0444 01/29/25  1442   SODIUM mmol/L 140 139 "   POTASSIUM mmol/L 3.8 4.2   CO2 mmol/L 24 24   ANION GAP mmol/L 14 12   BUN mg/dL 9 10   CREATININE mg/dL 0.60 0.77   GLUCOSE mg/dL 87 100*   EGFR mL/min/1.73m*2 >90 85   MAGNESIUM mg/dL 1.99  --       Results from last 7 days   Lab Units 01/30/25  0444 01/29/25  1442   ALT U/L 14 15   AST U/L 16 15   ALK PHOS U/L 84 90        Medications    amLODIPine, 5 mg, oral, Daily  aspirin, 81 mg, oral, Daily  atorvastatin, 80 mg, oral, Nightly  FLUoxetine, 20 mg, oral, Daily  losartan, 100 mg, oral, Daily  pantoprazole, 40 mg, intravenous, BID  potassium chloride CR, 40 mEq, oral, Once  thiamine, 100 mg, oral, Daily  ticagrelor, 90 mg, oral, q12h      PRN medications: diphenhydrAMINE           Assessment/Plan     Dorinda Benson is a 69 year old female with a PMHx of CAD (NSTEMI 12/2024 s/p PCI with JIM to mid RCA, on DAPT with Brilinta and ASA), HTN, DLD, preDM, diverticulosis, tobacco and alcohol use, and remote h/o cocaine use who presented to ED at PCP recommendation for symptomatic anemia and melena. Pt reporting melena since initiation of DAPT. Hb 7.1 on admission, s/p 1U RBC's. S/p EGD on 1/30 showing a bleeding angioectasia in the duodenum treated with clips x3 and APC. Continuing DAPT while inpatient, Cardiology consulted.    #Upper GI bleed  #Acute blood loss anemia  #Duodenal angioectasia  -etiology: most likely PUD iso hx AUD  -hgb 6.6 -> 7.1 on admission - received 1U blood  -continue twice daily CBC - transfuse if hgb <7  -start pantoprazole 40mg BID  -NPO for possible EGD/colonoscopy tomorrow     #CAD w/ stent placement 12/2024  #HTN  #HLD  -continuing DAPT therapy (ticagrelor 90mg daily and aspirin 81mg daily)  -continue losartan 100mg daily, amlodipine 5mg daily, atorvastatin 80mg daily  -Cardiology consulted; appreciate recommendations     #MIGEL  -continue fluoxetine 20mg capsule         Fluids: prn  Electrolytes: replete K>4, Mg>2  Nutrition: regular  GI ppx: PPI BID  DVT ppx: held iso GI bleed  Supplemental  O2: N/A  Antibiotics: N/A    NOK: Serina Medrano ([facility]  [phone] (Mobile)   Code: DNR       Lionel Robledo MD  Internal Medicine, PGY-1

## 2025-01-30 NOTE — ANESTHESIA POSTPROCEDURE EVALUATION
Patient: Dorinda Benson    Procedure Summary       Date: 01/30/25 Room / Location: [facility]    Anesthesia Start: 1218 Anesthesia Stop: 1315    Procedure: EGD Diagnosis: Melena    Scheduled Providers: Bel Jackson MD Responsible Provider: China Carranza MD    Anesthesia Type: MAC ASA Status: 3            Anesthesia Type: MAC    Vitals Value Taken Time   /73 01/30/25 1343   Temp Per PACU 01/30/25 1431   Pulse 60 01/30/25 1343   Resp 13 01/30/25 1343   SpO2 99 % 01/30/25 1343       Anesthesia Post Evaluation    Patient location during evaluation: PACU  Patient participation: complete - patient participated  Level of consciousness: awake  Pain management: adequate  Airway patency: patent  Cardiovascular status: acceptable  Respiratory status: acceptable  Hydration status: acceptable  Postoperative Nausea and Vomiting: none        No notable events documented.

## 2025-01-30 NOTE — SIGNIFICANT EVENT
Patient is planned for EGD. We discussed mikhail-procedural code status reversal and patient is amenable to being Full Code (chest compressions, mechanical ventilation) during procedure. Code status will be reversed to previous code status post-procedural. All questions were answered.

## 2025-01-30 NOTE — ANESTHESIA PREPROCEDURE EVALUATION
Patient: Dorinda Benson    Procedure Information       Date/Time: 01/30/25 1200    Scheduled providers: Bel Jackson MD    Procedure: EGD    Location: [facility]            Relevant Problems   Anesthesia (within normal limits)      Cardiac   (+) Benign essential hypertension   (+) Chest pain   (+) Hyperlipidemia   (+) NSTEMI (non-ST elevated myocardial infarction) (Multi) (12/6/25)   (+) Non-ST elevation (NSTEMI) myocardial infarction (Multi)   (+) Stented coronary artery (12/6/25)      Pulmonary   (+) Asthma   (+) Chronic obstructive pulmonary disease, unspecified      Neuro (within normal limits)      GI (within normal limits)      /Renal (within normal limits)      Liver (within normal limits)      Endocrine (within normal limits)      Hematology  On brilinta   (+) Anemia   (+) Anticoagulant long-term use      Musculoskeletal (within normal limits)      HEENT (within normal limits)      ID (within normal limits)      Skin (within normal limits)      GYN (within normal limits)       Clinical information reviewed:   Tobacco  Allergies  Meds   Med Hx  Surg Hx   Fam Hx  Soc Hx      1/8/2025 stress  Summary:   1. No clinical or electrocardiographic evidence for ischemia at a submaximal workload.   2. Submaximal level of stress achieved.  NPO Detail:  No data recorded     Physical Exam    Airway  Mallampati: II  TM distance: >3 FB  Neck ROM: full     Cardiovascular - normal exam     Dental   Comments: intact   Pulmonary - normal exam     Abdominal          Vitals:    01/30/25 0735   BP: 140/71   Pulse: 70   Resp: 16   Temp:    SpO2: 99%       Past Surgical History:   Procedure Laterality Date    CARDIAC CATHETERIZATION N/A 12/6/2024    Procedure: Left Heart Cath with Coronary Angiography and LV;  Surgeon: Carli Calvert MD;  Location: [place] [address] Cardiac Cath Lab;  Service: Cardiovascular;  Laterality: N/A;    HYSTERECTOMY       Past Medical History:   Diagnosis Date    Asthma     COPD (chronic  obstructive pulmonary disease) (Multi)     HLD (hyperlipidemia)     Hypertension        Current Facility-Administered Medications:     amLODIPine (Norvasc) tablet 5 mg, 5 mg, oral, Daily, Mark Nelson MD MPH, 5 mg at 01/30/25 0821    aspirin EC tablet 81 mg, 81 mg, oral, Daily, Mark Nelson MD MPH, 81 mg at 01/30/25 0821    atorvastatin (Lipitor) tablet 80 mg, 80 mg, oral, Nightly, Mark Nelson MD MPH, 80 mg at 01/30/25 0202    diphenhydrAMINE (BENADryl) capsule 25 mg, 25 mg, oral, Nightly PRN, Mark Nelson MD MPH, 25 mg at 01/30/25 0821    FLUoxetine (PROzac) capsule 20 mg, 20 mg, oral, Daily, Mark Nelson MD MPH, 20 mg at 01/30/25 0821    losartan (Cozaar) tablet 100 mg, 100 mg, oral, Daily, Mark Nelson MD MPH, 100 mg at 01/30/25 0821    [DISCONTINUED] pantoprazole (ProtoNix) EC tablet 40 mg, 40 mg, oral, Daily before breakfast **OR** pantoprazole (ProtoNix) injection 40 mg, 40 mg, intravenous, BID, Mark Nelson MD MPH, 40 mg at 01/30/25 0822    potassium chloride CR (Klor-Con M20) ER tablet 40 mEq, 40 mEq, oral, Once, Lionel Robledo MD    thiamine (Vitamin B-1) tablet 100 mg, 100 mg, oral, Daily, Mark Nelson MD MPH, 100 mg at 01/30/25 0821    ticagrelor (Brilinta) tablet 90 mg, 90 mg, oral, q12h, Mark Nelson MD MPH, 90 mg at 01/30/25 0202    Current Outpatient Medications:     amLODIPine (Norvasc) 5 mg tablet, TAKE 1 TABLET BY MOUTH ONCE DAILY, Disp: 90 tablet, Rfl: 3    aspirin 81 mg EC tablet, Take 1 tablet (81 mg) by mouth once daily., Disp: 30 tablet, Rfl: 11    atorvastatin (Lipitor) 80 mg tablet, Take 1 tablet (80 mg) by mouth once daily at bedtime., Disp: 30 tablet, Rfl: 11    diphenhydramine HCl (BENADRYL ALLERGY ORAL), Take 1 tablet by mouth as needed at bedtime., Disp: , Rfl:     FLUoxetine (PROzac) 20 mg capsule, Take 1 capsule (20 mg) by mouth once daily., Disp: 30 capsule,  Rfl: 1    losartan (Cozaar) 100 mg tablet, Take 1 tablet (100 mg) by mouth once daily., Disp: 30 tablet, Rfl: 11    multivitamin tablet, Take 1 tablet by mouth once daily., Disp: 30 tablet, Rfl: 11    thiamine 100 mg tablet, Take 1 tablet (100 mg) by mouth once daily., Disp: 30 tablet, Rfl: 11    ticagrelor (Brilinta) 90 mg tablet, Take 1 tablet (90 mg) by mouth 2 times a day., Disp: 60 tablet, Rfl: 11  Prior to Admission medications    Medication Sig Start Date End Date Taking? Authorizing Provider   amLODIPine (Norvasc) 5 mg tablet TAKE 1 TABLET BY MOUTH ONCE DAILY 1/7/25 1/7/26 Yes Zohaib Carty MD   aspirin 81 mg EC tablet Take 1 tablet (81 mg) by mouth once daily. 1/7/25 1/7/26 Yes Zohaib Carty MD   atorvastatin (Lipitor) 80 mg tablet Take 1 tablet (80 mg) by mouth once daily at bedtime. 1/7/25 1/2/26 Yes Zohaib Carty MD   diphenhydramine HCl (BENADRYL ALLERGY ORAL) Take 1 tablet by mouth as needed at bedtime.   Yes Historical Provider, MD   FLUoxetine (PROzac) 20 mg capsule Take 1 capsule (20 mg) by mouth once daily. 1/28/25 3/29/25 Yes Veronica Bolton MD   losartan (Cozaar) 100 mg tablet Take 1 tablet (100 mg) by mouth once daily. 1/7/25 1/2/26 Yes Zohaib Carty MD   multivitamin tablet Take 1 tablet by mouth once daily. 12/16/24 12/11/25 Yes Lawrence Lima MD   thiamine 100 mg tablet Take 1 tablet (100 mg) by mouth once daily. 1/28/25 1/28/26 Yes Veronica Bolton MD   ticagrelor (Brilinta) 90 mg tablet Take 1 tablet (90 mg) by mouth 2 times a day. 1/7/25 1/2/26 Yes Zohaib Carty MD     Allergies   Allergen Reactions    Aspirin Other     Per provider, patient gets upset stomach when taking aspirin    Latex Hives and Itching     Social History     Tobacco Use    Smoking status: Every Day     Current packs/day: 0.50     Types: Cigarettes    Smokeless tobacco: Never    Tobacco comments:     Down to 1 cigarette after eating    Substance Use Topics    Alcohol use: Not Currently      Comment: 4 pack Stripe with a shot of vodka for each beer daily         Chemistry    Lab Results   Component Value Date/Time     01/30/2025 0444    K 3.8 01/30/2025 0444     01/30/2025 0444    CO2 24 01/30/2025 0444    BUN 9 01/30/2025 0444    CREATININE 0.60 01/30/2025 0444    Lab Results   Component Value Date/Time    CALCIUM 9.7 01/30/2025 0444    ALKPHOS 84 01/30/2025 0444    AST 16 01/30/2025 0444    ALT 14 01/30/2025 0444    BILITOT 0.7 01/30/2025 0444          Lab Results   Component Value Date/Time    WBC 7.6 01/30/2025 0444    WBC 5.8 01/28/2025 1608    HGB 8.5 (L) 01/30/2025 0444    HGB 6.6 (L) 01/28/2025 1608    HCT 26.3 (L) 01/30/2025 0444    HCT 22.0 (L) 01/28/2025 1608     (H) 01/30/2025 0444     (H) 01/28/2025 1608     Lab Results   Component Value Date/Time    PROTIME 12.4 12/06/2024 2108    INR 1.1 12/06/2024 2108     Encounter Date: 01/07/25   ECG 12 Lead   Result Value    Ventricular Rate 73    Atrial Rate 73    KY Interval 150    QRS Duration 76    QT Interval 398    QTC Calculation(Bazett) 438    P Axis 33    R Axis 15    T Axis 0    QRS Count 12    Q Onset 218    P Onset 143    P Offset 193    T Offset 417    QTC Fredericia 425    Narrative    Normal sinus rhythm  Normal ECG  When compared with ECG of 06-DEC-2024 22:51,  No significant change was found  Confirmed by Zohaib Carty ([id number]) on 1/14/2025 11:26:49 AM     No results found for this or any previous visit from the past 1095 days.     Anesthesia Plan    History of general anesthesia?: yes  History of complications of general anesthesia?: unknown/emergency    ASA 3     MAC     intravenous induction   Anesthetic plan and risks discussed with spouse.  Use of blood products discussed with patient who consented to blood products.    Plan discussed with CAA and CRNA.

## 2025-01-30 NOTE — H&P
"History Of Present Illness  Dorinda Benson is a 69 y.o. female presenting with melena.    Dorinda Benson is a 69 y.o. female with a PMHx significant for CAD (NSTEMI 12/2024 s/p PCI with JIM to mid RCA, on DAPT with Brilinta and ASA), HTN, HLD, prediabetes (A1c 5.9), MIGEL, tobacco use, hx of alcohol use disorder, and diverticulosis who presents to the ED with one month of melena.    Patient describes daily black-colored stools for one month, notably shortly after recent stent placement and patient believes it is related to her being started on aspirin. Patient also admits feeling lightheaded and \"seeing black spots\" for the past two days. She admits SOB since stent placement. She denies ibuprofen use.     She denies fatigue, dizziness, abdominal pain, dyschezia, hematemesis, hemoptysis, constipation, nausea, vomiting, diarrhea, fevers, chills, changes in appetite, early satiety, and weight loss.     ED Course  Patient's hemoglobin was 7.1 on admission today. Patient was vitally stable and asymptomatic apart from melena and some lightheadedness. She received one unit of blood.     Past Medical History  She has a past medical history of CAD (NSTEMI 12/2024 s/p PCI with JIM to mid RCA, on DAPT with Brilinta and ASA), HTN, HLD, prediabetes (A1c 5.9), MIGEL, tobacco use, hx of alcohol use disorder, and diverticulosis.    Surgical History  She has a past surgical history that includes Hysterectomy and Cardiac catheterization (N/A, 12/6/2024).     Social History  Patient has been smoking 5 cigarettes per day since her 20s. Patient drinks 2 beers/day but was previously drinking beers and 4 shots of hard liquor. Patient takes 1 marijuana gummy daily. Patient used crack cocaine in 20s-30s but not since. Patient denies IV drug use.     Family History  Family History   Problem Relation Name Age of Onset    Dementia Mother      Alcohol abuse Brother      Other (bowel obstruction) Brother      Alcohol abuse Other       Patient's mother " "had ulcers and bleeding with aspirin  Patient's brother had a \"bleeding problem\"  Patient's great-aunt had colon cancer.     Allergies  Aspirin and Latex  Of note, pt reports ASA as an intolerance given dark stools rather than allergic symptoms.    Review of Systems  As above.    Cardiac Hx:    LHC 12/6/2024 by Dr. Calvert for NSTEMI  CONCLUSIONS:   1. LM: no obstructive CAD.   2. LAD: no obstructive CAD.   3. LCX: no obstructive CAD.   4. RCA: 100% mid RCA stenosis (culprit of NSTEMI) now s/p PCI with a 3.0 x 22 OLGA LIDIA [place] with post-dilation using a 3.5 NC.   5. ASA/Ticagrelor for 1 year followed by lifelong ASA.    TTE 12/6/24:    CONCLUSIONS:   1. The left ventricular systolic function is normal, with a Waters's biplane calculated ejection fraction of 73%.   2. There is normal right ventricular global systolic function.   3. Slightly elevated right ventricular systolic pressure.   4. The inferior vena cava appears mildly dilated, with IVC inspiratory collapse greater than 50%.   5. There is no evidence of a patent foramen ovale.    Exercise Stress Test 1/8/25:    Summary:   1. No clinical or electrocardiographic evidence for ischemia at a submaximal workload.   2. Submaximal level of stress achieved.    GI Hx:  Routine Colonoscopy 2/2023 by Dr. Frank    Impression:              - Three 2 to 4 mm polyps in the proximal ascending colon, removed with a cold snare. Resected and retrieved.   - Diverticulosis in the sigmoid colon.      Physical Exam  Constitutional:       Appearance: Normal appearance.   HENT:      Mouth/Throat:      Mouth: Mucous membranes are moist.      Comments: No mucosal pallor  Eyes:      General: Lids are normal.   Cardiovascular:      Rate and Rhythm: Normal rate and regular rhythm.      Pulses: Normal pulses.      Heart sounds: Normal heart sounds.   Pulmonary:      Effort: Pulmonary effort is normal.      Breath sounds: Normal breath sounds. No wheezing, rhonchi or rales.   Abdominal:    "   General: Abdomen is flat. There is no distension.      Palpations: Abdomen is soft. There is no mass.      Tenderness: There is no abdominal tenderness. There is no guarding or rebound.   Skin:     General: Skin is warm.      Coloration: Skin is not jaundiced or pale.      Comments: No nail changes   Neurological:      Mental Status: She is alert and oriented to person, place, and time.   Psychiatric:         Mood and Affect: Mood normal.         Behavior: Behavior normal.          Last Recorded Vitals  /77   Pulse 62   Temp 36.1 °C (97 °F) (Temporal)   Resp 18   Wt 73.9 kg (163 lb)   SpO2 100%     Relevant Results  Current Outpatient Medications:     amLODIPine (Norvasc) 5 mg tablet, TAKE 1 TABLET BY MOUTH ONCE DAILY, Disp: 90 tablet, Rfl: 3    aspirin 81 mg EC tablet, Take 1 tablet (81 mg) by mouth once daily., Disp: 30 tablet, Rfl: 11    atorvastatin (Lipitor) 80 mg tablet, Take 1 tablet (80 mg) by mouth once daily at bedtime., Disp: 30 tablet, Rfl: 11    diphenhydramine HCl (BENADRYL ALLERGY ORAL), Take 1 tablet by mouth as needed at bedtime., Disp: , Rfl:     FLUoxetine (PROzac) 20 mg capsule, Take 1 capsule (20 mg) by mouth once daily., Disp: 30 capsule, Rfl: 1    losartan (Cozaar) 100 mg tablet, Take 1 tablet (100 mg) by mouth once daily., Disp: 30 tablet, Rfl: 11    multivitamin tablet, Take 1 tablet by mouth once daily., Disp: 30 tablet, Rfl: 11    thiamine 100 mg tablet, Take 1 tablet (100 mg) by mouth once daily., Disp: 30 tablet, Rfl: 11    ticagrelor (Brilinta) 90 mg tablet, Take 1 tablet (90 mg) by mouth 2 times a day., Disp: 60 tablet, Rfl: 11     Results for orders placed or performed during the hospital encounter of 01/29/25 (from the past 24 hours)   Type and screen   Result Value Ref Range    ABO TYPE O     Rh TYPE POS     ANTIBODY SCREEN NEG    CBC and Auto Differential   Result Value Ref Range    WBC 5.2 4.4 - 11.3 x10*3/uL    nRBC 0.0 0.0 - 0.0 /100 WBCs    RBC 2.21 (L) 4.00 - 5.20  x10*6/uL    Hemoglobin 7.1 (L) 12.0 - 16.0 g/dL    Hematocrit 22.6 (L) 36.0 - 46.0 %     (H) 80 - 100 fL    MCH 32.1 26.0 - 34.0 pg    MCHC 31.4 (L) 32.0 - 36.0 g/dL    RDW 15.3 (H) 11.5 - 14.5 %    Platelets 492 (H) 150 - 450 x10*3/uL    Neutrophils % 62.5 40.0 - 80.0 %    Immature Granulocytes %, Automated 0.2 0.0 - 0.9 %    Lymphocytes % 24.8 13.0 - 44.0 %    Monocytes % 9.8 2.0 - 10.0 %    Eosinophils % 1.9 0.0 - 6.0 %    Basophils % 0.8 0.0 - 2.0 %    Neutrophils Absolute 3.26 1.20 - 7.70 x10*3/uL    Immature Granulocytes Absolute, Automated 0.01 0.00 - 0.70 x10*3/uL    Lymphocytes Absolute 1.29 1.20 - 4.80 x10*3/uL    Monocytes Absolute 0.51 0.10 - 1.00 x10*3/uL    Eosinophils Absolute 0.10 0.00 - 0.70 x10*3/uL    Basophils Absolute 0.04 0.00 - 0.10 x10*3/uL   Comprehensive metabolic panel   Result Value Ref Range    Glucose 100 (H) 74 - 99 mg/dL    Sodium 139 136 - 145 mmol/L    Potassium 4.2 3.5 - 5.3 mmol/L    Chloride 107 98 - 107 mmol/L    Bicarbonate 24 21 - 32 mmol/L    Anion Gap 12 10 - 20 mmol/L    Urea Nitrogen 10 6 - 23 mg/dL    Creatinine 0.77 0.50 - 1.05 mg/dL    eGFR 85 >60 mL/min/1.73m*2    Calcium 9.6 8.6 - 10.6 mg/dL    Albumin 4.4 3.4 - 5.0 g/dL    Alkaline Phosphatase 90 33 - 136 U/L    Total Protein 7.3 6.4 - 8.2 g/dL    AST 15 9 - 39 U/L    Bilirubin, Total 0.3 0.0 - 1.2 mg/dL    ALT 15 7 - 45 U/L   Iron and TIBC   Result Value Ref Range    Iron 35 35 - 150 ug/dL    UIBC 334 110 - 370 ug/dL    TIBC 369 240 - 445 ug/dL    % Saturation 9 (L) 25 - 45 %   Reticulocytes   Result Value Ref Range    Retic % 6.7 (H) 0.5 - 2.0 %    Retic Absolute 0.147 (H) 0.017 - 0.110 x10*6/uL    Reticulocyte Hemoglobin 29 28 - 38 pg    Immature Retic fraction 34.8 (H) <=16.0 %         Assessment/Plan     This is a 69 year old female presenting to the ED with a one-month history of melena in the context of recent stent placement (12/2024) on DAPT (ticagrelor and aspirin). She is vitally stable and mildly  symptomatic. Given history of alcohol use disorder, PUD is highest on the differential. Other causes of upper GI bleeds such as gastritis and Jannette-Morrow tears cannot be definitely ruled out. Given that patient is clinically stable plan is to continue administering DAPT and watch CBC with possibility of EGD/colonoscopy tomorrow.    #Melena  #Anemia  -etiology: most likely PUD iso hx AUD  -hgb 6.6 -> 7.1 on admission - received 1U blood  -continue twice daily CBC - transfuse if hgb <7  -start pantoprazole 40mg BID  -NPO for possible EGD/colonoscopy tomorrow    #CAD w/ stent placement 12/2024  #HTN  #HLD  -given patient's clinical stability, continue DAPT therapy (ticagrelor 90mg daily and aspirin 81mg daily)  -will plan to discuss DAPT with Interventional Cardiology in AM  -continue losartan 100mg daily, amlodipine 5mg daily, atorvastatin 80mg daily    #MIGEL  -continue fluoxetine 20mg capsule    Diet: NPO    Code status: DNR, okay with elective intubation and ICU transfer  NOK: Sophie Medina Mary: [phone]    JACOB ALLISON, MS3      Resident Addendum:    I saw the patient and have reviewed the above medical student documentation.    This is a 70yo F with PMH of CAD (NSTEMI 12/2024 s/p PCI with JIM to mid RCA, on DAPT with Brilinta and ASA), HTN, DLD, preDM, diverticulosis, tobacco and alcohol use, and remote h/o cocaine use who presented to ED at PCP recommendation for symptomatic anemia and melena.  The pt reports black, soft stools near daily since initiation of DAPT following PCI on 12/6/24. No BRBPR, no streaking blood or blood in toilet. No pain with defectation, hematemesis, N/V, early satiety, unintentional weight loss or other associated symptoms. She is not taking any additional NSAIDs and reports excellent adherence to her DAPT and prescribed medications. She presented to PCP with complaint of new lightheadedness for past few days on 1/28, with CBC revealing new Hgb drop to 6.6 (prev 10.3 on  12/8/24). She was advised to present to ED. Last stool yesterday was brown, with no BM yet today. On arrival to ED, pt is HDS, normo to hypertensive with no tachycardia. Hgb on arrival was 7.1, with , retics 6.7% and thrombocytosis to 492. CMP wnl, iron studies only remarkable for low % sat of 9. ECG NSR. T&S and consent obtained, pt was transfused 1U pRBCs in ED.   On exam, the pt has no additional complaints and is comfortable with no abdominal tenderness or distention, no other focal concerns.  Given consistent melena and Hgb drop with time course closely correlating to initiation of DAPT, likely UGIB with PUD as most likely etiology. Pt remains HDS with likely slow bleed. Pt is being admitted to GI service for further evaluation and consideration of EGD/colonoscopy. We will continue to monitor CBC q12H or more frequently as needed, initiate IV PPI BID and maintain NPO in case of procedure tomorrow. In the absence of brisk bleed or any hemodynamic changes, we will continue DAPT for now given that pt is just under 8 weeks from PCI placement, and plan to discuss with interventional Cardiology team in AM, or sooner OVN with any clinical change.  The patient will be admitted to [facility] service and staffed with daytime Attending, Dr. Kohler in AM.    Mark Nelson MD MPH  PGY-3, Internal Medicine and Pediatrics Resident Physician

## 2025-01-30 NOTE — CONSULTS
Consults  History Of Present Illness:        68yo F with PMH of CAD (NSTEMI 12/6/2024 s/p PCI with JIM to mid RCA, on DAPT with Brilinta and ASA), HTN, DLD, preDM, diverticulosis, tobacco and alcohol use, and remote h/o cocaine use who presented to ED at PCP recommendation for symptomatic anemia and melena. Cardiology consulted for DAPT management       Pt reports black, soft stools near daily since initiation of DAPT following PCI on 12/6/24. No BRBPR, no streaking blood or hematemesis. She is not taking NSAIDs and reports excellent adherence to her DAPT and prescribed medications. She presented to PCP with complaint of new lightheadedness for past few days on 1/28, with CBC revealing new Hgb drop to 6.6 (prev 10.3 on 12/8/24). She was advised to present to ED.      Cardiac meds  ASA  Brillinta  Amlodipine  Losartan  Atorvastatin     TTE 12/2024:   1. The left ventricular systolic function is normal, with a Waters's biplane calculated ejection fraction of 73%.   2. There is normal right ventricular global systolic function.   3. Slightly elevated right ventricular systolic pressure.   4. The inferior vena cava appears mildly dilated, with IVC inspiratory collapse greater than 50%.   5. There is no evidence of a patent foramen ovale.    [facility] 12/6/24:  RCA: 100% mid RCA occlusion s/p PCI with JIM 3.0x22 OLGA LIDIA JIM and post dilated mid and prox stent with a 3.5 NC balloon.           Last Recorded Vitals:  Vitals:    01/29/25 2358 01/30/25 0013 01/30/25 0124 01/30/25 0735   BP: 149/74 135/73 140/79 140/71   BP Location: Left arm Left arm Left arm    Patient Position:  Sitting Sitting    Pulse: 62 64 69 70   Resp: 16 16 16 16   Temp: 36.9 °C (98.4 °F) 36.9 °C (98.4 °F) 36.9 °C (98.4 °F)    TempSrc: Oral Oral Oral    SpO2: 100% 100% 100% 99%   Weight:       Height:           Last Labs:  CBC - 1/30/2025:  4:44 AM  7.6 8.5 465    26.3      CMP - 1/30/2025:  4:44 AM  9.7 7.3 16 --- 0.7   4.0 4.3 14 84      PTT - 12/6/2024:   3:38 PM  1.1   12.4 29     Troponin I, High Sensitivity (CMC)   Date/Time Value Ref Range Status   12/06/2024 04:58 PM 1,226 (HH) 0 - 34 ng/L Final     Comment:     Previous result verified on 12/6/2024 1134 on specimen/case [id number] called with component TRPHS for procedure Troponin, High Sensitivity, 1 Hour with value 134 ng/L.   12/06/2024 01:32  (HH) 0 - 34 ng/L Final     Comment:     Previous result verified on 12/6/2024 1134 on specimen/case [id number] called with component TRPHS for procedure Troponin, High Sensitivity, 1 Hour with value 134 ng/L.   12/06/2024 10:11  (HH) 0 - 34 ng/L Final     Hemoglobin A1C   Date/Time Value Ref Range Status   12/06/2024 07:54 AM 5.8 (H) See comment % Final   05/08/2024 03:02 PM 5.9 (H) see below % Final     LDL Calculated   Date/Time Value Ref Range Status   12/06/2024 07:54  (H) <=99 mg/dL Final     Comment:                                 Near   Borderline      AGE      Desirable  Optimal    High     High     Very High     0-19 Y     0 - 109     ---    110-129   >/= 130     ----    20-24 Y     0 - 119     ---    120-159   >/= 160     ----      >24 Y     0 -  99   100-129  130-159   160-189     >/=190     11/22/2023 03:11  (H) <=99 mg/dL Final     Comment:                                 Near   Borderline      AGE      Desirable  Optimal    High     High     Very High     0-19 Y     0 - 109     ---    110-129   >/= 130     ----    20-24 Y     0 - 119     ---    120-159   >/= 160     ----      >24 Y     0 -  99   100-129  130-159   160-189     >/=190       VLDL   Date/Time Value Ref Range Status   12/06/2024 07:54 AM 38 0 - 40 mg/dL Final   11/22/2023 03:11 PM 35 0 - 40 mg/dL Final      Last I/O:  I/O last 3 completed shifts:  In: 343.3 (4.6 mL/kg) [Blood:343.3]  Out: - (0 mL/kg)   Weight: 73.9 kg     Past Cardiology Tests (Last 3 Years):  EKG:  ECG 12 Lead 01/09/2025      ECG 12 lead 12/06/2024      ECG 12 lead 12/06/2024      ECG 12  lead 12/06/2024    Echo:  Transthoracic Echo (TTE) Complete 12/06/2024    Ejection Fractions:  EF   Date/Time Value Ref Range Status   12/06/2024 04:46 PM 73 %      Cath:  Cardiac Catheterization Procedure 12/06/2024    Stress Test:  Stress Test 01/08/2025    Cardiac Imaging:  CT angio coronary art with heartflow if score >30% 12/06/2024      Past Medical History:  She has a past medical history of Asthma, COPD (chronic obstructive pulmonary disease) (Multi), HLD (hyperlipidemia), and Hypertension.    Past Surgical History:  She has a past surgical history that includes Hysterectomy and Cardiac catheterization (N/A, 12/6/2024).      Social History:  She reports that she has been smoking cigarettes. She has never used smokeless tobacco. She reports that she does not currently use alcohol. She reports current drug use. Drug: Marijuana.    Family History:  Family History   Problem Relation Name Age of Onset    Dementia Mother      Alcohol abuse Brother      Other (bowel obstruction) Brother      Alcohol abuse Other          Allergies:  Aspirin and Latex    Inpatient Medications:  Scheduled medications   Medication Dose Route Frequency    amLODIPine  5 mg oral Daily    aspirin  81 mg oral Daily    atorvastatin  80 mg oral Nightly    FLUoxetine  20 mg oral Daily    losartan  100 mg oral Daily    pantoprazole  40 mg intravenous BID    potassium chloride CR  40 mEq oral Once    thiamine  100 mg oral Daily    ticagrelor  90 mg oral q12h     PRN medications   Medication    diphenhydrAMINE     Continuous Medications   Medication Dose Last Rate     Outpatient Medications:  Current Outpatient Medications   Medication Instructions    amLODIPine (Norvasc) 5 mg tablet TAKE 1 TABLET BY MOUTH ONCE DAILY    aspirin 81 mg, oral, Daily    atorvastatin (LIPITOR) 80 mg, oral, Nightly    diphenhydramine HCl (BENADRYL ALLERGY ORAL) 1 tablet, Nightly PRN    FLUoxetine (PROZAC) 20 mg, oral, Daily    losartan (COZAAR) 100 mg, oral, Daily     multivitamin tablet 1 tablet, oral, Daily    thiamine (VITAMIN B-1) 100 mg, oral, Daily    ticagrelor (BRILINTA) 90 mg, oral, 2 times daily          Assessment/Plan       70yo F with PMH of CAD (NSTEMI 12/6/2024 s/p PCI with JIM to mid RCA, on DAPT with Brilinta and ASA), HTN, DLD, preDM, diverticulosis, tobacco and alcohol use, and remote h/o cocaine use who presented to ED at PCP recommendation for symptomatic anemia and melena. Cardiology consulted for DAPT management.       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      Thank you for involving us in this patient care. Recommendations not final until signed by attending.     General Cardiology Consult Pager: [phone] (weekday 7AM-6PM and weekend 7AM-2PM) and other: [phone]  EP Consult Pager: [phone] (weekday 7AM-6PM and weekend 7AM-2PM) and other: [phone]  CICU Fellow Pager: [phone] anytime  EP Device Nurse Pager: [phone] (weekday 7AM-4PM)  Advanced Heart Failure Consult Pager: [phone] anytime          Sandro Guerrier MD

## 2025-01-31 ENCOUNTER — APPOINTMENT (OUTPATIENT)
Dept: CARDIAC REHAB | Facility: HOSPITAL | Age: 70
End: 2025-01-31
Payer: COMMERCIAL

## 2025-01-31 ENCOUNTER — PHARMACY VISIT (OUTPATIENT)
Dept: PHARMACY | Facility: CLINIC | Age: 70
End: 2025-01-31
Payer: COMMERCIAL

## 2025-01-31 VITALS
WEIGHT: 160.5 LBS | DIASTOLIC BLOOD PRESSURE: 65 MMHG | TEMPERATURE: 97.2 F | HEART RATE: 63 BPM | SYSTOLIC BLOOD PRESSURE: 118 MMHG | HEIGHT: 64 IN | RESPIRATION RATE: 18 BRPM | BODY MASS INDEX: 27.4 KG/M2 | OXYGEN SATURATION: 99 %

## 2025-01-31 LAB
ALBUMIN SERPL BCP-MCNC: 3.8 G/DL (ref 3.4–5)
ANION GAP SERPL CALC-SCNC: 13 MMOL/L (ref 10–20)
BASOPHILS # BLD AUTO: 0.03 X10*3/UL (ref 0–0.1)
BASOPHILS # BLD AUTO: 52 CELLS/UL (ref 0–200)
BASOPHILS NFR BLD AUTO: 0.5 %
BASOPHILS NFR BLD AUTO: 0.9 %
BUN SERPL-MCNC: 13 MG/DL (ref 6–23)
CALCIUM SERPL-MCNC: 8.9 MG/DL (ref 8.6–10.6)
CHLORIDE SERPL-SCNC: 106 MMOL/L (ref 98–107)
CO2 SERPL-SCNC: 24 MMOL/L (ref 21–32)
CREAT SERPL-MCNC: 0.7 MG/DL (ref 0.5–1.05)
EGFRCR SERPLBLD CKD-EPI 2021: >90 ML/MIN/1.73M*2
EOSINOPHIL # BLD AUTO: 0.11 X10*3/UL (ref 0–0.7)
EOSINOPHIL # BLD AUTO: 99 CELLS/UL (ref 15–500)
EOSINOPHIL NFR BLD AUTO: 1.7 %
EOSINOPHIL NFR BLD AUTO: 1.8 %
ERYTHROCYTE [DISTWIDTH] IN BLOOD BY AUTOMATED COUNT: 13.9 % (ref 11–15)
ERYTHROCYTE [DISTWIDTH] IN BLOOD BY AUTOMATED COUNT: 17 % (ref 11.5–14.5)
FOLATE SERPL-MCNC: 13.1 NG/ML
GLUCOSE SERPL-MCNC: 95 MG/DL (ref 74–99)
HCT VFR BLD AUTO: 22 % (ref 35–45)
HCT VFR BLD AUTO: 25 % (ref 36–46)
HGB BLD-MCNC: 6.6 G/DL (ref 11.7–15.5)
HGB BLD-MCNC: 7.9 G/DL (ref 12–16)
IMM GRANULOCYTES # BLD AUTO: 0.02 X10*3/UL (ref 0–0.7)
IMM GRANULOCYTES NFR BLD AUTO: 0.3 % (ref 0–0.9)
LYMPHOCYTES # BLD AUTO: 1.33 X10*3/UL (ref 1.2–4.8)
LYMPHOCYTES # BLD AUTO: 1694 CELLS/UL (ref 850–3900)
LYMPHOCYTES NFR BLD AUTO: 21.7 %
LYMPHOCYTES NFR BLD AUTO: 29.2 %
MAGNESIUM SERPL-MCNC: 1.99 MG/DL (ref 1.6–2.4)
MCH RBC QN AUTO: 31.1 PG (ref 26–34)
MCH RBC QN AUTO: 32.4 PG (ref 27–33)
MCHC RBC AUTO-ENTMCNC: 30 G/DL (ref 32–36)
MCHC RBC AUTO-ENTMCNC: 31.6 G/DL (ref 32–36)
MCV RBC AUTO: 107.8 FL (ref 80–100)
MCV RBC AUTO: 98 FL (ref 80–100)
METHYLMALONATE SERPL-SCNC: 83 NMOL/L (ref 69–390)
MONOCYTES # BLD AUTO: 0.67 X10*3/UL (ref 0.1–1)
MONOCYTES # BLD AUTO: 673 CELLS/UL (ref 200–950)
MONOCYTES NFR BLD AUTO: 10.9 %
MONOCYTES NFR BLD AUTO: 11.6 %
NEUTROPHILS # BLD AUTO: 3.98 X10*3/UL (ref 1.2–7.7)
NEUTROPHILS # BLD AUTO: 3283 CELLS/UL (ref 1500–7800)
NEUTROPHILS NFR BLD AUTO: 56.6 %
NEUTROPHILS NFR BLD AUTO: 64.8 %
NRBC BLD-RTO: 0 /100 WBCS (ref 0–0)
PHOSPHATE SERPL-MCNC: 3.9 MG/DL (ref 2.5–4.9)
PLATELET # BLD AUTO: 428 X10*3/UL (ref 150–450)
PLATELET # BLD AUTO: 470 THOUSAND/UL (ref 140–400)
PMV BLD REES-ECKER: 9.9 FL (ref 7.5–12.5)
POTASSIUM SERPL-SCNC: 3.7 MMOL/L (ref 3.5–5.3)
RBC # BLD AUTO: 2.04 MILLION/UL (ref 3.8–5.1)
RBC # BLD AUTO: 2.54 X10*6/UL (ref 4–5.2)
SERVICE CMNT-IMP: ABNORMAL
SODIUM SERPL-SCNC: 139 MMOL/L (ref 136–145)
WBC # BLD AUTO: 5.8 THOUSAND/UL (ref 3.8–10.8)
WBC # BLD AUTO: 6.1 X10*3/UL (ref 4.4–11.3)

## 2025-01-31 PROCEDURE — 36415 COLL VENOUS BLD VENIPUNCTURE: CPT

## 2025-01-31 PROCEDURE — 80069 RENAL FUNCTION PANEL: CPT

## 2025-01-31 PROCEDURE — 99239 HOSP IP/OBS DSCHRG MGMT >30: CPT

## 2025-01-31 PROCEDURE — 99233 SBSQ HOSP IP/OBS HIGH 50: CPT | Performed by: NURSE PRACTITIONER

## 2025-01-31 PROCEDURE — 2500000001 HC RX 250 WO HCPCS SELF ADMINISTERED DRUGS (ALT 637 FOR MEDICARE OP)

## 2025-01-31 PROCEDURE — 2500000004 HC RX 250 GENERAL PHARMACY W/ HCPCS (ALT 636 FOR OP/ED)

## 2025-01-31 PROCEDURE — RXMED WILLOW AMBULATORY MEDICATION CHARGE

## 2025-01-31 PROCEDURE — 83735 ASSAY OF MAGNESIUM: CPT

## 2025-01-31 PROCEDURE — 97161 PT EVAL LOW COMPLEX 20 MIN: CPT | Mod: GP

## 2025-01-31 PROCEDURE — 85025 COMPLETE CBC W/AUTO DIFF WBC: CPT

## 2025-01-31 RX ORDER — PANTOPRAZOLE SODIUM 40 MG/1
40 TABLET, DELAYED RELEASE ORAL DAILY
Qty: 30 TABLET | Refills: 1 | Status: SHIPPED | OUTPATIENT
Start: 2025-01-31

## 2025-01-31 RX ORDER — CLOPIDOGREL BISULFATE 75 MG/1
75 TABLET ORAL DAILY
Qty: 30 TABLET | Refills: 1 | Status: SHIPPED | OUTPATIENT
Start: 2025-01-31

## 2025-01-31 RX ADMIN — AMLODIPINE BESYLATE 5 MG: 5 TABLET ORAL at 10:15

## 2025-01-31 RX ADMIN — LOSARTAN POTASSIUM 100 MG: 50 TABLET, FILM COATED ORAL at 10:15

## 2025-01-31 RX ADMIN — FLUOXETINE HYDROCHLORIDE 20 MG: 20 CAPSULE ORAL at 10:16

## 2025-01-31 RX ADMIN — PANTOPRAZOLE SODIUM 40 MG: 40 INJECTION, POWDER, FOR SOLUTION INTRAVENOUS at 10:16

## 2025-01-31 RX ADMIN — CLOPIDOGREL BISULFATE 600 MG: 75 TABLET ORAL at 10:16

## 2025-01-31 RX ADMIN — THIAMINE HCL TAB 100 MG 100 MG: 100 TAB at 10:15

## 2025-01-31 ASSESSMENT — COGNITIVE AND FUNCTIONAL STATUS - GENERAL
DAILY ACTIVITIY SCORE: 24
MOBILITY SCORE: 24
MOBILITY SCORE: 24

## 2025-01-31 ASSESSMENT — PAIN SCALES - GENERAL
PAINLEVEL_OUTOF10: 0 - NO PAIN

## 2025-01-31 ASSESSMENT — PAIN - FUNCTIONAL ASSESSMENT
PAIN_FUNCTIONAL_ASSESSMENT: 0-10

## 2025-01-31 ASSESSMENT — ACTIVITIES OF DAILY LIVING (ADL)
ADL_ASSISTANCE: INDEPENDENT
LACK_OF_TRANSPORTATION: NO

## 2025-01-31 NOTE — PROGRESS NOTES
Occupational Therapy                   Therapy Communication Note    Patient Name: Dorinda Benson  MRN: [id number]  Department: [facility]  Room: 5557/5557-A  Today's Date: 1/31/2025     Discipline: Occupational Therapy    Missed Visit Reason: Missed Visit Reason: Other (Comment) (SCREEN- pt reporting baseline independence and able to demo IND bed mobility, transfers, ambulation, reaching balance, simulated ADLs. Pt reports safe home setup, good access to DME, and good social support. Pt has no skilled OT needs at this time.)    Missed Time: Attempt    Comment: SCREEN

## 2025-01-31 NOTE — PROGRESS NOTES
"Subjective:  Feeling \"1000% better\"  Up ambulating in [place] this AM  Denies CP/ SOB/dizziness, palpitations     Objective:  Last Recorded Vitals:  Vitals:    01/30/25 1343 01/30/25 1532 01/30/25 2003 01/31/25 0421   BP: 113/73 149/77 112/61 118/65   BP Location:       Patient Position:       Pulse: 60 60 68 63   Resp: 13 16 17 18   Temp:  36.2 °C (97.2 °F) 36.2 °C (97.2 °F) 36.2 °C (97.2 °F)   TempSrc:   Temporal Temporal   SpO2: 99% 100% 100% 99%   Weight:  72.8 kg (160 lb 7.9 oz)     Height:  1.626 m (5' 4\")         Physical Exam  General: Sitting up in bed, room air, NAD  Skin:  warm and dry  HEENT: EOMI. MMM  Cardiovascular: RRR. No JVD at 45 degrees   Respiratory: CTAB  Gastrointestinal: soft, non-distended  Extremities: no edema  Neurological: no gross focal deficits  Psychiatric: appropriate mood and affect       Last Labs:  CBC - 1/31/2025:  6:07 AM  6.1 7.9 428    25.0      CMP - 1/31/2025:  6:07 AM  8.9 7.3 16 --- 0.7   3.9 3.8 14 84      PTT - 12/6/2024:  3:38 PM  1.1   12.4 29     Troponin I, High Sensitivity (CMC)   Date/Time Value Ref Range Status   12/06/2024 04:58 PM 1,226 (HH) 0 - 34 ng/L Final     Comment:     Previous result verified on 12/6/2024 1134 on specimen/case [id number] called with component TRPHS for procedure Troponin, High Sensitivity, 1 Hour with value 134 ng/L.   12/06/2024 01:32  (HH) 0 - 34 ng/L Final     Comment:     Previous result verified on 12/6/2024 1134 on specimen/case [id number] called with component TRPHS for procedure Troponin, High Sensitivity, 1 Hour with value 134 ng/L.   12/06/2024 10:11  (HH) 0 - 34 ng/L Final     Hemoglobin A1C   Date/Time Value Ref Range Status   12/06/2024 07:54 AM 5.8 (H) See comment % Final   05/08/2024 03:02 PM 5.9 (H) see below % Final     LDL Calculated   Date/Time Value Ref Range Status   12/06/2024 07:54  (H) <=99 mg/dL Final     Comment:                                 Near   Borderline      AGE      Desirable  " Optimal    High     High     Very High     0-19 Y     0 - 109     ---    110-129   >/= 130     ----    20-24 Y     0 - 119     ---    120-159   >/= 160     ----      >24 Y     0 -  99   100-129  130-159   160-189     >/=190     11/22/2023 03:11  (H) <=99 mg/dL Final     Comment:                                 Near   Borderline      AGE      Desirable  Optimal    High     High     Very High     0-19 Y     0 - 109     ---    110-129   >/= 130     ----    20-24 Y     0 - 119     ---    120-159   >/= 160     ----      >24 Y     0 -  99   100-129  130-159   160-189     >/=190       VLDL   Date/Time Value Ref Range Status   12/06/2024 07:54 AM 38 0 - 40 mg/dL Final   11/22/2023 03:11 PM 35 0 - 40 mg/dL Final      Last I/O:  I/O last 3 completed shifts:  In: 597.3 (8.2 mL/kg) [I.V.:254 (3.5 mL/kg); Blood:343.3]  Out: - (0 mL/kg)   Weight: 72.8 kg        TTE 12/2024:   1. The left ventricular systolic function is normal, with a Waters's biplane calculated ejection fraction of 73%.   2. There is normal right ventricular global systolic function.   3. Slightly elevated right ventricular systolic pressure.   4. The inferior vena cava appears mildly dilated, with IVC inspiratory collapse greater than 50%.   5. There is no evidence of a patent foramen ovale.    [facility] 12/6/24:  RCA: 100% mid RCA occlusion s/p PCI with JIM 3.0x22 OLGA LIDIA JIM and post dilated mid and prox stent with a 3.5 NC balloon.     Inpatient Medications:  Scheduled medications   Medication Dose Route Frequency    amLODIPine  5 mg oral Daily    atorvastatin  80 mg oral Nightly    clopidogrel  600 mg oral Once    And    [START ON 2/1/2025] clopidogrel  75 mg oral Daily    FLUoxetine  20 mg oral Daily    losartan  100 mg oral Daily    pantoprazole  40 mg intravenous BID    potassium chloride CR  40 mEq oral Once    thiamine  100 mg oral Daily     PRN medications   Medication    diphenhydrAMINE     Continuous Medications   Medication Dose Last Rate      Outpatient Medications:  Current Outpatient Medications   Medication Instructions    amLODIPine (Norvasc) 5 mg tablet TAKE 1 TABLET BY MOUTH ONCE DAILY    aspirin 81 mg, oral, Daily    atorvastatin (LIPITOR) 80 mg, oral, Nightly    clopidogrel (PLAVIX) 75 mg, oral, Daily    diphenhydramine HCl (BENADRYL ALLERGY ORAL) 1 tablet, Nightly PRN    FLUoxetine (PROZAC) 20 mg, oral, Daily    losartan (COZAAR) 100 mg, oral, Daily    multivitamin tablet 1 tablet, oral, Daily    pantoprazole (PROTONIX) 40 mg, oral, Daily, Do not crush, chew, or split.    thiamine (VITAMIN B-1) 100 mg, oral, Daily    ticagrelor (BRILINTA) 90 mg, oral, 2 times daily          Assessment/Plan       70yo F with PMH of CAD (NSTEMI 12/6/2024 s/p PCI with JIM to mid RCA, on DAPT with Brilinta and ASA), HTN, DLD, preDM, diverticulosis, tobacco and alcohol use, and remote h/o cocaine use who presented to ED at PCP recommendation for symptomatic anemia and melena. Cardiology consulted for DAPT management.       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 Zohaib Carty in the next 2-3 weeks  - She should have a CBC in the next week and follow up with her PCP    Cardiology will sign off  Case discussed with JULIEN Wong-CNP  Cardiology Consults    Please call with any questions  General Cardiology Consult Pager: [phone] (weekday 7AM-6PM and weekend 7AM-2PM) and other: [phone]  EP Consult Pager: [phone] (weekday 7AM-6PM and weekend 7AM-2PM) and other: [phone]  CICU Fellow Pager: [phone] anytime  EP Device Nurse Pager: [phone] (weekday 7AM-4PM)  Advanced Heart Failure Consult Pager: [phone] anytime

## 2025-01-31 NOTE — DISCHARGE INSTRUCTIONS
Hi Mrs. Benson and family,    You were admitted to [facility] on 1/29/25 with ongoing blood in the stool. When you arrived in the emergency department, you were found to have low blood counts, which we felt was due to likely losing blood through your stomach or small intestine. We performed an endoscopy, which showed a small bleeding area in the the area of small intestine just past your stomach, which we treated to stop the bleeding. We had our Cardiology team see you, since we feel that your blood thinners are causing or worsening this bleed. They switched your medications, having you now only take a blood thinner called plavix (clopidogrel) as well as an antacid called pantoprazole to prevent bleeding ulcers. We would like you to return to a blood draw in one week to ensure that you are not having any further bleeding.     Medication Changes on Discharge:  -STOP taking ticagrelor (Brillinta)  -STOP taking aspirin   -START taking Plavid (clopiodgrel) 75 mg daily - for prevention of blood clots in your cardiac stent   -START taking pantoprazole 40 mg daily - for prevention of ulcers in the stomach and small intestine    Follow Up Appointments after Discharge:  -Primary Care - [facility] - on 2/14/25 with Dr. Downing  -Cardiology - with Dr. Zohaib Carty at [facility] - please schedule appointment if you do not hear from his office by early next week. Can call [phone] to schedule.     If you develop any new or concerning symptoms, please feel free to return to the Emergency Department where we will be happy to treat you.   Thank you for entrusting us with your care. It has been our pleasure treating you during your stay.     Take Care,  Your  Care Team

## 2025-01-31 NOTE — PROGRESS NOTES
Physical Therapy    Physical Therapy Evaluation    Patient Name: Dorinda Benson  MRN: [id number]  Today's Date: 1/31/2025   Time Calculation  Start Time: 0910  Stop Time: 0919  Time Calculation (min): 9 min    Assessment/Plan   PT Assessment  Barriers to Discharge Home: No anticipated barriers  End of Session Communication: Bedside nurse  Assessment Comment: Pt at functional baseline and does not have any PT needs.  End of Session Patient Position: Bed, 3 rail up, Alarm off, not on at start of session  IP OR SWING BED PT PLAN  Inpatient or Swing Bed: Inpatient  PT Plan  PT Plan: PT Eval only  PT Eval Only Reason: At baseline function  PT Frequency: PT eval only  PT Discharge Recommendations: No further acute PT, No PT needed after discharge  PT Recommended Transfer Status: Independent  PT - OK to Discharge: Yes      Subjective   General Visit Information:  Reason for Referral: anemia and melena  Past Medical History Relevant to Rehab: CAD (NSTEMI 12/6/2024 s/p PCI with JIM to mid RCA, on DAPT with Brilinta and ASA), HTN, DLD, preDM, diverticulosis, tobacco and alcohol use, and remote h/o cocaine use  Prior to Session Communication: Bedside nurse  Patient Position Received: Bed, 3 rail up, Alarm off, not on at start of session   Home Living:  Home Living  Type of Home: Apartment  Lives With: Alone  Home Adaptive Equipment: None  Home Layout: One level  Home Access: Elevator  Prior Level of Function:  Prior Function Per Pt/Caregiver Report  ADL Assistance: Independent  Homemaking Assistance: Independent  Ambulatory Assistance: Independent  Vocational: Full time employment  Prior Function Comments: +drives       Objective     Pain:  Pain Assessment  Pain Assessment: 0-10  0-10 (Numeric) Pain Score: 0 - No pain  Cognition:  Cognition  Orientation Level: Oriented X4      Extremity/Trunk Assessments:  Strength:     RUE   RUE : Within Functional Limits  LUE   LUE: Within Functional Limits  RLE   RLE : Within Functional  Limits  LLE   LLE : Within Functional Limits    General Assessments:            Sensation  Light Touch: No apparent deficits     Static Sitting Balance  Static Sitting-Level of Assistance: Independent  Dynamic Sitting Balance  Dynamic Sitting-Level of Assistance: Independent  Static Standing Balance  Static Standing-Level of Assistance: Independent  Dynamic Standing Balance  Dynamic Standing-Level of Assistance: Independent    Functional Assessments:  Bed Mobility  Bed Mobility: Yes  Bed Mobility 1  Bed Mobility 1: Supine to sitting, Sitting to supine  Level of Assistance 1: Independent  Transfers  Transfer: Yes  Transfer 1  Technique 1: Sit to stand, Stand to sit  Transfer Level of Assistance 1: Independent  Ambulation/Gait Training  Ambulation/Gait Training Performed: Yes  Ambulation/Gait Training 1  Assistance 1: Independent  Quality of Gait 1:  (steady, no LOB)  Comments/Distance (ft) 1: 150 feet          Outcome Measures:  [facility] Basic Mobility  Turning from your back to your side while in a flat bed without using bedrails: None  Moving from lying on your back to sitting on the side of a flat bed without using bedrails: None  Moving to and from bed to chair (including a wheelchair): None  Standing up from a chair using your arms (e.g. wheelchair or bedside chair): None  To walk in hospital room: None  Climbing 3-5 steps with railing: None  Basic Mobility - Total Score: 24                                01/31/25 at 10:01 AM   Katlyn Tee, PT

## 2025-01-31 NOTE — PROGRESS NOTES
01/31/25 1153   Discharge Planning   Living Arrangements Alone   Support Systems Family members   Assistance Needed Patient independent with ADL's   Type of Residence Private residence   Number of Stairs to Enter Residence 0   Number of Stairs Within Residence 0   Do you have animals or pets at home? No   Who is requesting discharge planning? Provider   Home or Post Acute Services None   Expected Discharge Disposition Home   Does the patient need discharge transport arranged? No   Financial Resource Strain   How hard is it for you to pay for the very basics like food, housing, medical care, and heating? Not very   Housing Stability   In the last 12 months, was there a time when you were not able to pay the mortgage or rent on time? N   At any time in the past 12 months, were you homeless or living in a shelter (including now)? N   Transportation Needs   In the past 12 months, has lack of transportation kept you from medical appointments or from getting medications? no   In the past 12 months, has lack of transportation kept you from meetings, work, or from getting things needed for daily living? No   Patient Choice   Patient / Family choosing to utilize agency / facility established prior to hospitalization No     Assessment Note:  Met with patient and Introduced myself as care coordinator and member of the Care Transitions team for discharge planning.  Patient lives alone in apt she is independent with ADL's. Pt feels safe at home.     Transportation: Patient drive to appt her car in the "[employer]"  Pharmacy: [facility]  DME: None  Previous home care: None  Falls: No recent Falls  PCP: Mario Macias MD Last seen last week  Dialysis: Brian   Diabetic: Brian FALL BSN-RN  Transitional Care Coordinator (TCC)  [phone] ext [phone]

## 2025-01-31 NOTE — NURSING NOTE
1/31/2025 1503 Discharge Note  Patient discharged home with no needs. Discharge instructions discussed with patient, verbalized understanding. Aware of follow up appointments needed. Patient stated that cardiac rehab stated she needs a new referral after being hospitalized, [facility] team made aware and stated they would place referral. Meds to beds delivered to patient prior to discharge. Peripheral IV removed. Belongings gathered. No further needs per patient. Transported off unit via wheelchair at 1411. ----- Ronit Bae RN

## 2025-02-01 NOTE — DISCHARGE SUMMARY
Discharge Diagnosis  Acute blood loss anemia (resolved)  Duodenal angioectasia  Coronary artery disease s/p PCI    Issues Requiring Follow-Up  Cardiac rehab follow up   Cardiology follow up for management of antiplatelet therapy - discharged on plavix monotherapy    Discharge Meds     Medication List      START taking these medications     clopidogrel 75 mg tablet; Commonly known as: Plavix; Take 1 tablet (75   mg) by mouth once daily.   pantoprazole 40 mg EC tablet; Commonly known as: ProtoNix; Take 1 tablet   (40 mg) by mouth once daily. Do not crush, chew, or split.     CONTINUE taking these medications     amLODIPine 5 mg tablet; Commonly known as: Norvasc; TAKE 1 TABLET BY   MOUTH ONCE DAILY   atorvastatin 80 mg tablet; Commonly known as: Lipitor; Take 1 tablet (80   mg) by mouth once daily at bedtime.   BENADRYL ALLERGY ORAL   FLUoxetine 20 mg capsule; Commonly known as: PROzac; Take 1 capsule (20   mg) by mouth once daily.   losartan 100 mg tablet; Commonly known as: Cozaar; Take 1 tablet (100   mg) by mouth once daily.   multivitamin tablet; Take 1 tablet by mouth once daily.   thiamine 100 mg tablet; Commonly known as: Vitamin B-1; Take 1 tablet   (100 mg) by mouth once daily.     STOP taking these medications     aspirin 81 mg EC tablet   ticagrelor 90 mg tablet; Commonly known as: Brilinta       Test Results Pending At Discharge  Pending Labs       No current pending labs.            Hospital Course  Dorinda Benson is a 69 year old female with a PMHx of CAD (NSTEMI 12/2024 s/p PCI with JIM to mid RCA, on DAPT with Brilinta and ASA), HTN, DLD, preDM, diverticulosis, tobacco and alcohol use, and remote h/o cocaine use who presented to ED at PCP recommendation for symptomatic anemia and melena.     Pt had reported recurrent melena since initiating DAPT after her PCI in December. She received one unit of pRBC's in the ED with Hb 7.1 on admission, incremented to 8.5 without recurrent melena. Remained HDS. Pt  underwent EGD on 1/30 showing bleeding AVM which required APC for hemostasis after clips x3 failed to achieve hemostasis. She was continued on brillinta and asa while admitted given recent JIM. Cardiology consulted, recommending discontinuing both brillinta and aspirin, and starting plavix monotherapy with indefinite PPI use. Pt was loaded with plavix dose on 1/31 and discharged on 1/31 with 75 mg plavix daily, as well as pantoprazole daily which will be indefinite.   Referral was placed to follow up with [facility] Valdovinos in 2-3 weeks. She will also be seen in [facility] resident clinic in early February. Order also placed for repeat CBC in one week post discharge.      Pertinent Physical Exam At Time of Discharge    General: Alert and interactive, in no acute distress, on ambient air  HEENT: NC/AT, EOMI, no conjunctival icterus, mucosa   Cardiovascular: Regular rate & rhythm, no murmurs  Pulmonary: Lungs CTA bilaterally. Normal work of breathing. No wheezes, rales, or rhonchi  GI/Abd: Normoactive bowel sounds. No abd distention or TTP. No organomegaly  :   Extremities: No peripheral edema  Skin: No jaundice, rashes, or surgical scars. No wounds  Neuro: Alert and oriented x3. No focal deficit.   Psych: Appropriate mood and affect      Outpatient Follow-Up  Future Appointments   Date Time Provider Department Center   2/3/2025  2:00 PM [facility] RE[facility]AB [address] Academic   2/5/2025  2:00 PM [facility]ARD[facility] REHAB [address]   2/7/2025  2:00 PM C[facility] REHAB [address] Academic   2/10/2025  2:00 PM CARD[facility]AC REHAB [address] [facility]   2/12/2025  2:00 PM CARD[facility] REHAB [address] [facility]   2/14/2025  2:00 PM CARD[facility]AC REHAB [address]   2/14/2025  3:15 PM Marco Antonio Downing MD [id number] Academic   2/17/2025  2:00 PM CARDIAC REHAB [address]  [id number] Academic   2/19/2025  2:00 PM [facility] RE[facility] [id number] CARDREHB ROOM [id number] Academic   2/21/2025  2:00 PM [facility] REHAB [facility] [id number] CARDREHB ROOM [id number] Academic   2/24/2025  2:00 PM [facility] REHAB [facility] [id number] CARDREHB ROOM [id number] [facility]   2/26/2025  2:00 PM [facility]ARD[facility] REHAB [facility] [id number] CARDREHB ROOM [id number] [facility]   2/28/2025  2:00 PM [facility] REH[facility] [id number] CARDREHB ROOM [id number] Academic   3/3/2025  2:00 PM [facility] REHAB [facility] [id number] CARDREHB ROOM [id number] Academic   3/5/2025  2:00 PM [facility] REH[facility] [id number] CARDREHB ROOM [id number] Academic   3/7/2025  2:00 PM [facility] REHAB [facility] [id number] CARDREHB ROOM [id number] Academic   3/10/2025  2:00 PM [facility]ARD[facility] REHAB [facility] [id number] CARDREHB ROOM [id number] Academic   3/12/2025  2:00 PM [facility]ARD[facility] REHAB [facility] [id number] CARDREHB ROOM [id number] Academic   3/14/2025  2:00 PM [facility]ARD[facility] REHAB [facility] [id number] CARDREHB ROOM [id number] Academic   3/17/2025  2:00 PM [facility] REHAB [facility] [id number] CARDREHB ROOM [id number] Academic   3/19/2025  2:00 PM [facility] REHAB [facility] [id number] CARDREHB ROOM [id number] Academic   3/21/2025  2:00 PM [facility]ARD[facility] REHAB [facility] [id number] CARDREHB ROOM [id number] Academic   3/24/2025  2:00 PM CARDIAC REHAB [facility] [id number] CARDREHB ROOM [id number] Academic   3/26/2025  2:00 PM [facility]ARD[facility] REHAB [facility] [id number] CARDREHB ROOM [id number] Academic   3/28/2025  2:00 PM [facility]ARD[facility] REHAB [facility] [id number] CARDREHB ROOM [id number] Academic   3/31/2025  2:00 PM [facility]ARD[facility] REHAB [facility] [id number] CARDREHB ROOM [id number] Academic   4/2/2025  2:00 PM [facility] REHAB [facility] [id number] CARDREHB ROOM [id number] Academic   4/4/2025  2:00 PM CARDIAC REHAB [id number] CARDREHB ROOM [id number] Academic   4/7/2025  2:00 PM CARDIAC REHAB [place] [address] CARDREHB ROOM [id number] Academic   4/9/2025  2:00 PM CARDIAC REHAB [place] [address] CARDREHB ROOM [id number] Academic   4/11/2025  2:00 PM CARDIAC REHAB [place] [address] CARDREHB ROOM [id number] Academic   4/14/2025  2:00 PM  CARDIAC REHAB [facility] [id number] CARDREHB ROOM [id number] Academic   5/13/2025  3:00 PM Zohaib Carty MD [id number] Academic   5/21/2025  3:15 PM Veronica Bolton MD [id number] Academic   6/4/2025  2:15 PM Toño Rios, EM [id number] Academic         Lionel Robledo MD  Internal Medicine PGY-1

## 2025-02-01 NOTE — PROGRESS NOTES
I reviewed the resident/fellow's documentation and saw and discussed the patient with the resident/fellow. I agree with the resident/fellow's medical decision making as documented in the note.      Patient care and presentation reviewed with the resident  NAME: Dorinda Benson  HPI: 69 year old female with CAD, s/p NSTEMI s/p PCI with drug eluting stent to RCA.  She has been decreasing her alcohol and tobacco use.  Down to one drink and 1-2 cigarettes per day.  Today she notes great anxiety.  She is emotional and crying.  She has good support system and goes to [employer].  Lots of life stressors.  She describes melena she had after starting aspirin and brillinta and it has resolved.  Last colonoscopy had 3 polyps removed in 2023.    PMHx: HNT, hyperlipidemia, prediabetes, substance use disorder with ETOH and tobacco  MEDS: amlodipine, losartan, atorvastatin, brillinta, aspirin,   ALLERGIES:  SOCIAL HX: She moved to [place] from [place] with her mom 3 years ago.  Her mom had dementia and she has never coped with the challenges.    OBJECTIVE:117/79  RECOMMEND:   Fluoxetine initiated.    Access clinic for counselor  Tobacco cessation referral  Encourage tobacco and alcohol discontinuation  To check her CBC given her concerning description of melena.  Today her BP and pulse are not indicative of acute bleed but given her dual antiplatelet therapy need to be cautious.    Close followup.    Ly Ying MD

## 2025-02-03 ENCOUNTER — APPOINTMENT (OUTPATIENT)
Dept: CARDIAC REHAB | Facility: HOSPITAL | Age: 70
End: 2025-02-03
Payer: MEDICARE

## 2025-02-03 ENCOUNTER — TELEPHONE (OUTPATIENT)
Dept: CARDIAC REHAB | Facility: HOSPITAL | Age: 70
End: 2025-02-03
Payer: COMMERCIAL

## 2025-02-03 ENCOUNTER — PHARMACY VISIT (OUTPATIENT)
Dept: PHARMACY | Facility: CLINIC | Age: 70
End: 2025-02-03
Payer: COMMERCIAL

## 2025-02-03 PROCEDURE — RXMED WILLOW AMBULATORY MEDICATION CHARGE

## 2025-02-03 NOTE — TELEPHONE ENCOUNTER
PATIENT: Dorinda Benson  DATE: 2/3/2025    Patient has been discharged from hospital s/p admission for anemia. Pt called rehab staff to relay status and inquire about returning to exercise program. Pt was informed that per program policy, she must FU with provider and get medical clearance prior to returning to exercise. She is scheduled to see her PCP on 2/14/25 and is set to return on 2/17/25. Appointments were canceled and rescheduled appropriately.     Inna Edouard RN

## 2025-02-05 ENCOUNTER — APPOINTMENT (OUTPATIENT)
Dept: CARDIAC REHAB | Facility: HOSPITAL | Age: 70
End: 2025-02-05
Payer: MEDICARE

## 2025-02-06 ENCOUNTER — APPOINTMENT (OUTPATIENT)
Dept: RADIOLOGY | Facility: HOSPITAL | Age: 70
End: 2025-02-06
Payer: COMMERCIAL

## 2025-02-06 ENCOUNTER — HOSPITAL ENCOUNTER (EMERGENCY)
Facility: HOSPITAL | Age: 70
Discharge: HOME | End: 2025-02-06
Attending: EMERGENCY MEDICINE
Payer: COMMERCIAL

## 2025-02-06 VITALS
OXYGEN SATURATION: 98 % | HEART RATE: 68 BPM | SYSTOLIC BLOOD PRESSURE: 130 MMHG | DIASTOLIC BLOOD PRESSURE: 70 MMHG | TEMPERATURE: 98.2 F | RESPIRATION RATE: 14 BRPM

## 2025-02-06 DIAGNOSIS — E87.1 HYPONATREMIA: ICD-10-CM

## 2025-02-06 DIAGNOSIS — J10.1 INFLUENZA A: Primary | ICD-10-CM

## 2025-02-06 LAB
ALBUMIN SERPL BCP-MCNC: 4.2 G/DL (ref 3.4–5)
ALP SERPL-CCNC: 84 U/L (ref 33–136)
ALT SERPL W P-5'-P-CCNC: 13 U/L (ref 7–45)
ANION GAP SERPL CALC-SCNC: 17 MMOL/L
APTT PPP: 24 SECONDS (ref 27–38)
AST SERPL W P-5'-P-CCNC: 20 U/L (ref 9–39)
BASOPHILS # BLD AUTO: 0.02 X10*3/UL (ref 0–0.1)
BASOPHILS NFR BLD AUTO: 0.7 %
BILIRUB SERPL-MCNC: 0.3 MG/DL (ref 0–1.2)
BUN SERPL-MCNC: 10 MG/DL (ref 6–23)
CALCIUM SERPL-MCNC: 9.5 MG/DL (ref 8.6–10.6)
CHLORIDE SERPL-SCNC: 95 MMOL/L (ref 98–107)
CO2 SERPL-SCNC: 23 MMOL/L (ref 21–32)
CREAT SERPL-MCNC: 0.87 MG/DL (ref 0.5–1.05)
EGFRCR SERPLBLD CKD-EPI 2021: 73 ML/MIN/1.73M*2
EOSINOPHIL # BLD AUTO: 0.01 X10*3/UL (ref 0–0.7)
EOSINOPHIL NFR BLD AUTO: 0.3 %
ERYTHROCYTE [DISTWIDTH] IN BLOOD BY AUTOMATED COUNT: 15.5 % (ref 11.5–14.5)
FLUAV RNA RESP QL NAA+PROBE: DETECTED
FLUBV RNA RESP QL NAA+PROBE: NOT DETECTED
GLUCOSE SERPL-MCNC: 87 MG/DL (ref 74–99)
HCT VFR BLD AUTO: 28.2 % (ref 36–46)
HGB BLD-MCNC: 9.6 G/DL (ref 12–16)
IMM GRANULOCYTES # BLD AUTO: 0 X10*3/UL (ref 0–0.7)
IMM GRANULOCYTES NFR BLD AUTO: 0 % (ref 0–0.9)
INR PPP: 1 (ref 0.9–1.1)
LYMPHOCYTES # BLD AUTO: 0.76 X10*3/UL (ref 1.2–4.8)
LYMPHOCYTES NFR BLD AUTO: 25.2 %
MCH RBC QN AUTO: 30.3 PG (ref 26–34)
MCHC RBC AUTO-ENTMCNC: 34 G/DL (ref 32–36)
MCV RBC AUTO: 89 FL (ref 80–100)
MONOCYTES # BLD AUTO: 0.56 X10*3/UL (ref 0.1–1)
MONOCYTES NFR BLD AUTO: 18.6 %
NEUTROPHILS # BLD AUTO: 1.66 X10*3/UL (ref 1.2–7.7)
NEUTROPHILS NFR BLD AUTO: 55.2 %
NRBC BLD-RTO: 0 /100 WBCS (ref 0–0)
PLATELET # BLD AUTO: 362 X10*3/UL (ref 150–450)
POTASSIUM SERPL-SCNC: 4 MMOL/L (ref 3.5–5.3)
PROT SERPL-MCNC: 7.6 G/DL (ref 6.4–8.2)
PROTHROMBIN TIME: 11.3 SECONDS (ref 9.8–12.8)
RBC # BLD AUTO: 3.17 X10*6/UL (ref 4–5.2)
SARS-COV-2 RNA RESP QL NAA+PROBE: NOT DETECTED
SODIUM SERPL-SCNC: 131 MMOL/L (ref 136–145)
WBC # BLD AUTO: 3 X10*3/UL (ref 4.4–11.3)

## 2025-02-06 PROCEDURE — 71046 X-RAY EXAM CHEST 2 VIEWS: CPT

## 2025-02-06 PROCEDURE — 85610 PROTHROMBIN TIME: CPT | Performed by: EMERGENCY MEDICINE

## 2025-02-06 PROCEDURE — 36415 COLL VENOUS BLD VENIPUNCTURE: CPT | Performed by: EMERGENCY MEDICINE

## 2025-02-06 PROCEDURE — 85025 COMPLETE CBC W/AUTO DIFF WBC: CPT | Performed by: EMERGENCY MEDICINE

## 2025-02-06 PROCEDURE — 99284 EMERGENCY DEPT VISIT MOD MDM: CPT | Mod: 25 | Performed by: EMERGENCY MEDICINE

## 2025-02-06 PROCEDURE — 2500000001 HC RX 250 WO HCPCS SELF ADMINISTERED DRUGS (ALT 637 FOR MEDICARE OP): Performed by: EMERGENCY MEDICINE

## 2025-02-06 PROCEDURE — 71046 X-RAY EXAM CHEST 2 VIEWS: CPT | Performed by: RADIOLOGY

## 2025-02-06 PROCEDURE — 87636 SARSCOV2 & INF A&B AMP PRB: CPT | Performed by: EMERGENCY MEDICINE

## 2025-02-06 PROCEDURE — 99284 EMERGENCY DEPT VISIT MOD MDM: CPT | Performed by: EMERGENCY MEDICINE

## 2025-02-06 PROCEDURE — 80053 COMPREHEN METABOLIC PANEL: CPT | Performed by: EMERGENCY MEDICINE

## 2025-02-06 RX ORDER — ACETAMINOPHEN 325 MG/1
975 TABLET ORAL ONCE
Status: COMPLETED | OUTPATIENT
Start: 2025-02-06 | End: 2025-02-06

## 2025-02-06 RX ADMIN — ACETAMINOPHEN 975 MG: 325 TABLET ORAL at 19:49

## 2025-02-06 ASSESSMENT — PAIN SCALES - GENERAL
PAINLEVEL_OUTOF10: 0 - NO PAIN
PAINLEVEL_OUTOF10: 6

## 2025-02-06 ASSESSMENT — PAIN DESCRIPTION - LOCATION: LOCATION: HEAD

## 2025-02-06 NOTE — ED TRIAGE NOTES
Endorses dry, harsh cough, HA, chills x 3 days.     Of note pt had recent EGD on 1/30 after being hospitalized for symptomatic anemia, hgb 7.1.

## 2025-02-07 ENCOUNTER — APPOINTMENT (OUTPATIENT)
Dept: CARDIAC REHAB | Facility: HOSPITAL | Age: 70
End: 2025-02-07
Payer: MEDICARE

## 2025-02-07 DIAGNOSIS — D62 ANEMIA DUE TO ACUTE BLOOD LOSS: ICD-10-CM

## 2025-02-07 NOTE — DISCHARGE INSTRUCTIONS
You were seen in the emergency department for flulike symptoms and tested positive for influenza A.  Your labs are otherwise notable for a slightly low white blood cell count which is likely from your infection, as well as low sodium with a level of 131.  Your chest x-ray did not show any obvious signs of pneumonia.  We feel that it is safe for you to go home based on your appearance and stable vitals.  Monitor symptoms closely.  Take Tylenol as needed for pain or fever, drink plenty of fluids, get rest, and follow-up with your primary care doctor.  If you have worsening symptoms or new symptoms develop, return to the emergency department.

## 2025-02-09 NOTE — ED PROVIDER NOTES
Emergency Department Provider Note             History of Present Illness   CC: Cough, Headache, and Chills    History provided by: Patient  Limitations to History: None  External Records Reviewed: prior ED provider notes, clinic notes, discharge summaries    HPI:  Dorinda Benson is a 69 y.o. female with history including COPD, asthma, hypertension, hyperlipidemia, anemia, duodenal angioectasia, CAD s/p PCI on DAPT presenting to the emergency department for a cough, body aches, chills, malaise, headache of insidious onset for the past 3 days. States she was recently admitted for GI bleeding and underwent EGD with clipping and APC therapy. Since then has been doing better until onset of these symptoms. She denies fever, vomiting, diarrhea, sore throat, GI bleeding, abdominal pain, chest pain, dyspnea, syncope.       Physical Exam   Triage vitals:  T (!) 38 °C (100.4 °F)  HR 95  /86  RR 20  O2 97 % None (Room air)    General: awake, well-appearing, no distress  Head: normocephalic, atraumatic, no temporal tenderness  Eyes: pupils equal, extraocular movements grossly intact, no conjunctival injection or scleral icterus  ENT: nares patent, moist mucous membranes  Neck: supple, trachea midline, no masses, no meningismus  CV: regular rate and rhythm, well-perfused  Resp: breathing is non-labored, speaking in full sentences. Lungs are clear to auscultation bilaterally  GI: soft, non-distended, non-tender, no rebound or guarding  Extremities: no edema, no gross deformity   Neuro: alert, oriented, speech is fluent, face is symmetric, moving all extremities equally, sensation grossly intact, steady gait  Psych: Appropriate mood and affect    ED Course & Medical Decision Making     69 y.o. female with history including COPD, asthma, hypertension, hyperlipidemia, anemia, duodenal angioectasia, CAD s/p PCI on DAPT presenting to the emergency department for a cough, body aches, chills, malaise, headache of insidious onset  for the past 2-3 days. She's well-appearing, no distress. Not meningitic and mentating appropriately. She is febrile to 38 with otherwise stable vitals. Does not appear systemically ill. Presenting symptoms seem most suggestive of a viral illness so antibiotics not empirically given. She was given tylenol. Because of her age, comorbidities, and recent admission, labs were obtained. Labs are notable for mild leukopenia without significant neutropenia, new hyponatremia of 131 and hypochloremia. Hemoglobin improved to 9.6. Influenza A positive. I independently reviewed her chest x-ray which shows no acute process. I discussed results with her. She reports decreased oral intake due to symptoms but is able to tolerate PO. Discussed tamiflu benefits vs possible adverse effects and she declines. Also discussed observation vs discharge with close follow up and she prefers the latter. I advised her on supportive care, close monitoring, hydration, rest, and following up with her PCP. She was given strict return precautions.     Social Determinants Limiting Care: None identified    Results: Independently reviewed and interpreted by me. Please see ED course and MDM for my full interpretation.     Chronic Medical Conditions Significantly Affecting Care: as per MDM    Patient was discussed with the following consultants/services: None     Care Considerations: as per [facility]    Diagnoses as of 02/09/25 8692   Influenza A   Hyponatremia       Disposition   Discharge    MD Sonia Lovell MD  02/09/25 8552

## 2025-02-10 ENCOUNTER — PHARMACY VISIT (OUTPATIENT)
Dept: PHARMACY | Facility: CLINIC | Age: 70
End: 2025-02-10
Payer: COMMERCIAL

## 2025-02-10 ENCOUNTER — APPOINTMENT (OUTPATIENT)
Dept: CARDIAC REHAB | Facility: HOSPITAL | Age: 70
End: 2025-02-10
Payer: MEDICARE

## 2025-02-10 PROCEDURE — RXMED WILLOW AMBULATORY MEDICATION CHARGE

## 2025-02-11 LAB
ERYTHROCYTE [DISTWIDTH] IN BLOOD BY AUTOMATED COUNT: 15.1 % (ref 11–15)
HCT VFR BLD AUTO: 28.3 % (ref 35–45)
HGB BLD-MCNC: 8.7 G/DL (ref 11.7–15.5)
MCH RBC QN AUTO: 29.7 PG (ref 27–33)
MCHC RBC AUTO-ENTMCNC: 30.7 G/DL (ref 32–36)
MCV RBC AUTO: 96.6 FL (ref 80–100)
PLATELET # BLD AUTO: 384 THOUSAND/UL (ref 140–400)
PMV BLD REES-ECKER: 10.2 FL (ref 7.5–12.5)
RBC # BLD AUTO: 2.93 MILLION/UL (ref 3.8–5.1)
WBC # BLD AUTO: 4.6 THOUSAND/UL (ref 3.8–10.8)

## 2025-02-12 ENCOUNTER — APPOINTMENT (OUTPATIENT)
Dept: CARDIAC REHAB | Facility: HOSPITAL | Age: 70
End: 2025-02-12
Payer: MEDICARE

## 2025-02-14 ENCOUNTER — PHARMACY VISIT (OUTPATIENT)
Dept: PHARMACY | Facility: CLINIC | Age: 70
End: 2025-02-14
Payer: COMMERCIAL

## 2025-02-14 ENCOUNTER — OFFICE VISIT (OUTPATIENT)
Dept: PRIMARY CARE | Facility: HOSPITAL | Age: 70
End: 2025-02-14
Payer: COMMERCIAL

## 2025-02-14 ENCOUNTER — APPOINTMENT (OUTPATIENT)
Dept: CARDIAC REHAB | Facility: HOSPITAL | Age: 70
End: 2025-02-14
Payer: MEDICARE

## 2025-02-14 VITALS
WEIGHT: 158.7 LBS | DIASTOLIC BLOOD PRESSURE: 75 MMHG | HEART RATE: 62 BPM | OXYGEN SATURATION: 100 % | HEIGHT: 64 IN | BODY MASS INDEX: 27.1 KG/M2 | SYSTOLIC BLOOD PRESSURE: 117 MMHG | TEMPERATURE: 98.3 F

## 2025-02-14 DIAGNOSIS — Z12.2 SCREENING FOR LUNG CANCER: ICD-10-CM

## 2025-02-14 DIAGNOSIS — D50.9 IRON DEFICIENCY ANEMIA, UNSPECIFIED IRON DEFICIENCY ANEMIA TYPE: Primary | ICD-10-CM

## 2025-02-14 DIAGNOSIS — Z87.891 PERSONAL HISTORY OF NICOTINE DEPENDENCE: ICD-10-CM

## 2025-02-14 DIAGNOSIS — Z12.31 ENCOUNTER FOR SCREENING MAMMOGRAM FOR MALIGNANT NEOPLASM OF BREAST: ICD-10-CM

## 2025-02-14 DIAGNOSIS — R05.2 SUBACUTE COUGH: ICD-10-CM

## 2025-02-14 PROCEDURE — RXMED WILLOW AMBULATORY MEDICATION CHARGE

## 2025-02-14 RX ORDER — FERROUS SULFATE 325(65) MG
325 TABLET, DELAYED RELEASE (ENTERIC COATED) ORAL EVERY OTHER DAY
Qty: 15 TABLET | Refills: 11 | Status: SHIPPED | OUTPATIENT
Start: 2025-02-14 | End: 2026-02-14

## 2025-02-14 RX ORDER — GUAIFENESIN 600 MG/1
1200 TABLET, EXTENDED RELEASE ORAL 2 TIMES DAILY
Qty: 120 TABLET | Refills: 2 | Status: SHIPPED | OUTPATIENT
Start: 2025-02-14

## 2025-02-14 ASSESSMENT — ENCOUNTER SYMPTOMS
DEPRESSION: 0
LOSS OF SENSATION IN FEET: 0
OCCASIONAL FEELINGS OF UNSTEADINESS: 0

## 2025-02-14 ASSESSMENT — PAIN SCALES - GENERAL: PAINLEVEL_OUTOF10: 0-NO PAIN

## 2025-02-14 NOTE — PROGRESS NOTES
Chief complaint: FUV    HPI:  Dorinda Benson is a 69 y.o. female with a PMHx significant for CAD (NSTEMI 12/2024 s/p PCI with JIM to mid RCA, now on plavix monotherapy), hx of GIB, HTN, HLD, prediabetes (A1c 5.9), and substance use, who presented today for follow-up.    Of note she was admitted 1/29-1/31 for anemia (hgb 6.6) and melena iso DAPT use. She underwent EGD showing bleeding AVM which required APC for hemostasis after clips x3 failed to achieve hemostasis. Cardiology was consulted who recommended transition to plavix monotherapy and PPI indefinitely. She was then seen in the ED 2/6 for URI symptoms and was diagnosed with influenza A and discharged with supportive care, tamiflu refused.     Today, patient states she feels much better. She is over the flu but still has some post viral cough. Denies any melena/BRPR. Cut down on smoking to 1-2 cigarettes per day and down to beer 1 can per night. States she has felt much better after her anemia has been treated and states she feels her symptoms earlier in the year were due to low blood counts. She denies any chest pain or TOTH. Otherwise has no complaints.       Medications:    Current Outpatient Medications:     amLODIPine (Norvasc) 5 mg tablet, TAKE 1 TABLET BY MOUTH ONCE DAILY, Disp: 90 tablet, Rfl: 3    atorvastatin (Lipitor) 80 mg tablet, Take 1 tablet (80 mg) by mouth once daily at bedtime., Disp: 30 tablet, Rfl: 11    clopidogrel (Plavix) 75 mg tablet, Take 1 tablet (75 mg) by mouth once daily., Disp: 30 tablet, Rfl: 1    diphenhydramine HCl (BENADRYL ALLERGY ORAL), Take 1 tablet by mouth as needed at bedtime., Disp: , Rfl:     FLUoxetine (PROzac) 20 mg capsule, Take 1 capsule (20 mg) by mouth once daily., Disp: 30 capsule, Rfl: 1    losartan (Cozaar) 100 mg tablet, Take 1 tablet (100 mg) by mouth once daily., Disp: 30 tablet, Rfl: 11    multivitamin tablet, Take 1 tablet by mouth once daily., Disp: 30 tablet, Rfl: 11    pantoprazole (ProtoNix) 40 mg EC  "tablet, Take 1 tablet (40 mg) by mouth once daily. Do not crush, chew, or split., Disp: 30 tablet, Rfl: 1    thiamine 100 mg tablet, Take 1 tablet (100 mg) by mouth once daily., Disp: 30 tablet, Rfl: 11    Allergies:  Allergies   Allergen Reactions    Aspirin Other     Per provider, patient gets upset stomach when taking aspirin    Latex Hives and Itching       Vitals:  /75 (BP Location: Left arm, Patient Position: Sitting, BP Cuff Size: Adult)   Pulse 62   Temp 36.8 °C (98.3 °F) (Temporal)   Ht 1.626 m (5' 4\")   Wt 72 kg (158 lb 11.2 oz)   SpO2 100%   BMI 27.24 kg/m²       Physical exam:  Constitutional: Well-developed female in no acute distress.  HEENT: Normocephalic, atraumatic. PERRL. EOMI. No cervical lymphadenopathy.  Respiratory: CTA bilaterally. No wheezes, rales, or rhonchi. Normal respiratory effort.  Cardiovascular: RRR. No murmurs, gallops, or rubs. No JVD. Radial pulses 2+.  Abdominal: Soft, nondistended, nontender to palpation. Bowel sounds present. No hepatosplenomegaly or masses. No CVA tenderness.  Neuro: CN II-XII intact. UE and LE strength 5/5 bilaterally and sensation intact.  MSK: No LE edema bilaterally.  Skin: Warm, dry. No rashes or wounds.  Psych: Appropriate mood and affect.    Labs:  No results found for this or any previous visit (from the past 24 hours).    Imaging:  XR chest 2 views    Result Date: 2/6/2025  Interpreted By:  Jenaro Aquino and Bartolomei Aguilar Christopher STUDY: XR CHEST 2 VIEWS;  2/6/2025 10:09 pm   INDICATION: Signs/Symptoms:cough.   COMPARISON: Chest radiograph 12/06/2024   ACCESSION NUMBER(S): [id number]   ORDERING CLINICIAN: NELIA WYATT   FINDINGS: Erect PA and lateral views of the chest were provided.       CARDIOMEDIASTINAL SILHOUETTE: Cardiomediastinal silhouette is normal in size and configuration.   LUNGS: Lungs are clear.   ABDOMEN: No remarkable upper abdominal findings.   BONES: No acute osseous changes.       1.  No evidence " of acute cardiopulmonary process.   I personally reviewed the images/study and I agree with the findings as stated by Dieter Ridley MD (Radiology Resident). This study was interpreted at [facility], [place], [place].   MACRO: None   Signed by: Jenaro Aquino 2/6/2025 11:08 PM Dictation workstation:   [id number]    Esophagogastroduodenoscopy (EGD)    Result Date: 1/30/2025  Table formatting from the original result was not included. Impression The upper third of the esophagus, middle third of the esophagus and lower third of the esophagus appeared normal. The cardia, body of the stomach, antrum and pylorus appeared normal. Mild erythematous mucosa in the 1st part of the duodenum The 2nd part of the duodenum appeared normal. Bleeding single 10 mm angioectasia in the 4th part of the duodenum; this is the likely source of patient's melena and CHRISTIANO ; placed 3 clips successfully; hemostasis not achieved; induced coagulation and hemostasis achieved w/ APC. Findings The upper third of the esophagus, middle third of the esophagus and lower third of the esophagus appeared normal. Z-line is 38 cm from the incisors. The cardia, body of the stomach, antrum and pylorus appeared normal. Mild, localized erythematous mucosa in the 1st part of the duodenum; no bleeding was observed. Lymphangiectasia observed. The 2nd part of the duodenum appeared normal. Lymphangiectasia observed. Single 10 mm angioectasia in the 4th part of the duodenum; This was actively bleeding; placed 3 clips successfully (clips are MRI conditional); hemostasis not achieved; induced coagulation and hemostasis achieved with 4 applications of argon plasma coagulation Recommendation - The patient will be observed post-procedure, until all discharge criteria are met. - [facility]. - Defer resumption of diet and medications to the patient's primary team. No restrictions  from a post-procedural standpoint. - Observe patient's clinical course following today's procedure - The findings and recommendations were discussed with the referring physicians. - The signs and symptoms of potential delayed complications were discussed with the patient. - Follow up with Anna Kohler and Dr. Brown for ongoing inpatient gastroenterology consultation. Indication Melena Staff Staff Role Katelynn Brown MD Fellow Bel Jackson MD Proceduralist Medications See Anesthesia Record. Preprocedure A history and physical has been performed, and patient medication allergies have been reviewed. The patient's tolerance of previous anesthesia has been reviewed. The risks and benefits of the procedure and the sedation options and risks were discussed with the patient. All questions were answered and informed consent obtained. Details of the Procedure The patient underwent monitored anesthesia care, which was administered by an anesthesia professional. The patient's blood pressure, ECG, ETCO2, heart rate, level of consciousness, oxygen and respirations were monitored throughout the procedure. The scope was introduced through the mouth and advanced to the second part of the duodenum. Retroflexion was performed in the cardia. Prior to the procedure, the patient's H. Pylori status was unknown. The patient's estimated blood loss was minimal (<5 mL). The procedure was not difficult. The patient tolerated the procedure well. There were no apparent adverse events. Events Procedure Events Event Event Time ENDO SCOPE IN TIME 1/30/2025 12:26 PM ENDO SCOPE OUT TIME 1/30/2025  1:03 PM Specimens No specimens collected Procedure Location [facility] [address] [facility] [id number] [phone] Referring Provider Katelynn Brown MD Procedure Provider Bel Jackson MD       Assessment and plan:  Dorinda Benson is a 69 y.o. female with a PMHx significant for CAD (NSTEMI 12/2024 s/p PCI with JIM to  "mid RCA, now on plavix monotherapy), hx of GIB, HTN, HLD, prediabetes (A1c 5.9), and substance use, who presented today for follow-up.    Updates:  -reordered CBC/RFP to monitor hyponatremia and anemia iso recent GIB  -ordered iron studies and ferrous sulfate 325 every other day for now  -Mucinex ordered for post-viral cough  -mammogram ordered for 5/25, LDCT ordered for 8/25  -Patient is clear to return to cardiac rehab  -Patient states she will work on quitting smoking, she is down to 1-2 cigarettes per day    #hyponatremia  ::Na 131 on Ed visit  ::Likely iso influenza A and poor PO  -recheck today  -encouraged hydration     #hx UGIB iso DAPT  #CHRISTIANO  ::Pt endorsing \"dark stools\" started 12/28 - 01/27 following initiation of DAPT  ::Admitted 1/29/25 for UGIB s/p clip x3 and APC to AVM  ::switched from DAPT to plavix monotherapy per [facility] 1/30/25  ::most recent hgb 9.6 (2/6) ->8.7 (2/10)  -cw PPI 40mg daily  -antiplatelet therapy as below  -fu CBC today  -fu iron studies today  -Ferrous sulfate 325 every other day started    #CAD   #HLD  #NSTEMI s/p PCI to RCA 12/2024  ::Most recent lipid panel 12/2024 showing:              -Total 272, HDL 73, , Tri 190  ::Follow with Cards, Dr. Carty, last visits 01/07/25  ::switched from DAPT to plavix monotherapy per cards 1/30/25  -Continue Atorvastatin 80 mg  -Continue cardiac rehab (12/19 to current)  -Follow up with cardiology 05/13/2025  -cw plavix monotherapy     #Alcohol use  #Tobacco use  ::Per patient she is down to 1-2 cigarettes and 1 beer per day  -Smoking cessation counseling and encouragement of decreased alcohol use provided in office today  -Referral to Tobacco cessation counseling, ordered  -Thiamine supplementation 100 mg daily, ordered    #Anxiety  #Insomnia  ::Per HPI, pt endorsing increased anxiety over the past month, is improved today  ::MIGEL-7 1/28/25  -cw fluoxetine 20 mg daily       #HTN  -BP in office today 117/75  -Continue amlodipine 5 mg and " losartan 100 mg          #Prediabetes  -Hga1c 5.8 12/2024  -Continue lifestyle changes     #Health maintenance  - Last HbA1c: 5.8 12/2024  - ASCVD: 25.9, on high-dose statin therapy  - STOP-BANG: not addressed today  - Infectious:  - Hep C negative 2023   - HIV negative 2024  - Vaccinations:  - Tdap: 5/29/2024   - Flu not addressed today  - VZV: Completed   - PNA: PCV20 in 2023   - Colonoscopy: last done in 2/22/2023, 2-4 polyps resected, recommended repeat colonoscopy in 3 - 5 years for surveillance  (Due 2026 - 2028)  - Pap testing  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 1 year follow up pap w/HPV recommended - OVERDUE 03/2024 - patient states she was told this was not needed, mammogram last mammogram 5/29/2024, negative for malignancy (Due 05/2025 -ordered today)  - Low-dose CT chest: Low dose CT done in August 2024: stable 3mm pulmonary nodules in the left lung, likely benign (Due 08/2025-ordered today)  - Bone density: last done 4/27/2023   - AAA screening: n/a      Follow-up in May appointment (already scheduled).    Patient and plan discussed with attending physician, Dr. Bishop.    Marco Antonio Downing MD  PGY-2 Internal Medicine  [facility]

## 2025-02-14 NOTE — PATIENT INSTRUCTIONS
As discussed today, our plan is:     Labs - you can get this done today or come back anytime in the next 4-6 weeks at any  facility. We will call you with any abnormal results.  Medication changes - I prescribed some Mucinex for your cough as well as iron for your anemia, you can take the Mucinex as needed and the iron pill should be taken every other day   Consultations - please be sure to follow up with your cardiologist in March.  We have also ordered a mammogram (to be completed in or after May) and a CAT scan (to be completed in or after August)     Please come back to see us at your scheduled appointment in May.  ------  If you have any problems or questions, please contact the clinic at [phone] to leave a message. Our fax number is [phone]. If your issue cannot wait until the next business day, please go to urgent care or the emergency department.     I also strongly urge all of my patients to register for [email or web address] by going to: [email or web address]  (The  staff can also send you a text/email link to register when you check out).    No shows: It is understandable if you are unable to make it to a visit, but please cancel your appointment instead of not showing up. This helps to give other patients access to primary care and keeps wait times low.      Dr. Marco Antonio Downing

## 2025-02-15 LAB
ALBUMIN SERPL-MCNC: 4.1 G/DL (ref 3.6–5.1)
BASOPHILS # BLD AUTO: 32 CELLS/UL (ref 0–200)
BASOPHILS NFR BLD AUTO: 0.6 %
BUN SERPL-MCNC: 10 MG/DL (ref 7–25)
BUN/CREAT SERPL: NORMAL (CALC) (ref 6–22)
CALCIUM SERPL-MCNC: 9.1 MG/DL (ref 8.6–10.4)
CHLORIDE SERPL-SCNC: 106 MMOL/L (ref 98–110)
CO2 SERPL-SCNC: 29 MMOL/L (ref 20–32)
CREAT SERPL-MCNC: 0.9 MG/DL (ref 0.5–1.05)
EGFRCR SERPLBLD CKD-EPI 2021: 70 ML/MIN/1.73M2
EOSINOPHIL # BLD AUTO: 143 CELLS/UL (ref 15–500)
EOSINOPHIL NFR BLD AUTO: 2.7 %
ERYTHROCYTE [DISTWIDTH] IN BLOOD BY AUTOMATED COUNT: 15 % (ref 11–15)
FERRITIN SERPL-MCNC: 26 NG/ML (ref 16–288)
GLUCOSE SERPL-MCNC: 97 MG/DL (ref 65–99)
HCT VFR BLD AUTO: 29.6 % (ref 35–45)
HGB BLD-MCNC: 8.9 G/DL (ref 11.7–15.5)
IRON SATN MFR SERPL: 12 % (CALC) (ref 16–45)
IRON SERPL-MCNC: 40 MCG/DL (ref 45–160)
LYMPHOCYTES # BLD AUTO: 1834 CELLS/UL (ref 850–3900)
LYMPHOCYTES NFR BLD AUTO: 34.6 %
MCH RBC QN AUTO: 29.6 PG (ref 27–33)
MCHC RBC AUTO-ENTMCNC: 30.1 G/DL (ref 32–36)
MCV RBC AUTO: 98.3 FL (ref 80–100)
MONOCYTES # BLD AUTO: 784 CELLS/UL (ref 200–950)
MONOCYTES NFR BLD AUTO: 14.8 %
NEUTROPHILS # BLD AUTO: 2507 CELLS/UL (ref 1500–7800)
NEUTROPHILS NFR BLD AUTO: 47.3 %
PHOSPHATE SERPL-MCNC: 3.9 MG/DL (ref 2.1–4.3)
PLATELET # BLD AUTO: 467 THOUSAND/UL (ref 140–400)
PMV BLD REES-ECKER: 10.1 FL (ref 7.5–12.5)
POTASSIUM SERPL-SCNC: 4.7 MMOL/L (ref 3.5–5.3)
RBC # BLD AUTO: 3.01 MILLION/UL (ref 3.8–5.1)
SODIUM SERPL-SCNC: 139 MMOL/L (ref 135–146)
TIBC SERPL-MCNC: 322 MCG/DL (CALC) (ref 250–450)
WBC # BLD AUTO: 5.3 THOUSAND/UL (ref 3.8–10.8)

## 2025-02-16 PROCEDURE — RXMED WILLOW AMBULATORY MEDICATION CHARGE

## 2025-02-17 ENCOUNTER — CLINICAL SUPPORT (OUTPATIENT)
Dept: CARDIAC REHAB | Facility: HOSPITAL | Age: 70
End: 2025-02-17
Payer: COMMERCIAL

## 2025-02-17 ENCOUNTER — DOCUMENTATION (OUTPATIENT)
Dept: CARDIAC REHAB | Facility: HOSPITAL | Age: 70
End: 2025-02-17
Payer: COMMERCIAL

## 2025-02-17 VITALS — DIASTOLIC BLOOD PRESSURE: 60 MMHG | HEART RATE: 75 BPM | SYSTOLIC BLOOD PRESSURE: 120 MMHG

## 2025-02-17 DIAGNOSIS — I21.4 NSTEMI (NON-ST ELEVATED MYOCARDIAL INFARCTION) (MULTI): ICD-10-CM

## 2025-02-17 PROCEDURE — 93798 PHYS/QHP OP CAR RHAB W/ECG: CPT | Performed by: INTERNAL MEDICINE

## 2025-02-17 NOTE — PROGRESS NOTES
Cardiac Rehabilitation 30 Day Reassessment    Name: Dorinda Benson   Medical Record Number: [id number]  YOB: 1955   Age: 69 y.o.  Today’s Date: 2/17/2025   Primary Care Physician: Veronica Bolton MD   Referring Physician: Harris De La Rosa MD / Zohaib Carty MD  Program Location: [address]  Primary Diagnosis: NSTEMI (non-ST elevated myocardial infarction) (Multi) [I21.4]   Onset/Date of Diagnosis: 12/06/24   Session #: 4 / 36  AACVPR Risk Stratification: High   Falls Risk: Low    Psychosocial Reassessment:    Stress Assessment  Pre PHQ-9: 11  No data recorded   Sent PH-Q 9 to MD if score > 20: No; score < 20    Quality of Life Survey: SF-36   SF-36 Pre Post   Physical Component Score completed TBD   Mental Component Score completed TBD     Pt reported/currently experiencing stress: Yes; Stress; Severity: moderate  Patient uses stress management skills: No   History of: no history of anxiety or depression  Currently seeing a mental health provider: No  Social Support: Yes, Whom:family    Learning Assessment  Learning assessment/barriers: None  Preferred learning method: Auditory, Visual, Reading handout, and Writing handout  Barriers: None  Comments:    Stages of Change: Action    Psychosocial Plan  Goal Status: In progress  Psychosocial Goals: Demonstrating proper techniques for stress management and Maintain or lower PH-Q 9 score by discharge  Psychosocial Interventions/Education:   2/17/25 No change in psychosocial status as reported by patient.    Nutrition Reassessment:   Diet Habit Survey: Picture Your Plate  Pre:  37  Post: To be done at discharge.    Survey results reviewed with Dietician: Not at this time    Lipids Assessment  Current Dietary Guidelines: Low fat  Barriers to dietary change: no  Hyperlipidemia: Yes   Lab Results   Component Value Date    CHOL 272 (H) 12/06/2024    HDL 73.5 12/06/2024    LDLCALC 161 (H) 12/06/2024    TRIG 190 (H) 12/06/2024      Diabetes Assessment  History of Diabetes: No  Lab Results   Component Value Date    HGBA1C 5.8 (H) 12/06/2024      Weight Management      Nutrition Plan  Goal Status: In progress  Nutrition Goals: Lipid Goal: HDL>45, LDL <70, Total <180, Trigs <150, Improve Diet Habit Survey score by 5-10 points by discharge, Adapt a Mediterranean focused diet prior to discharge, Learn how to read and interpret nutrition labels prior to discharge, and Lose 1lb/week while enrolled in program  Nutrition Interventions/Education:   1/15/2025 Patient attended Heart Healthy Diet lecture with program RDN during today's exercise session. Heart Healthy diet tips sheet was given for further review at home and patient was encouraged to come back with any follow up questions.     Exercise Reassessment:   Home Exercise  Current Home Exercise?: No  Mode: NA  Frequency: NA  Duration: NA   Home Exercise Prescription given: To be given prior to discharge from program.    Exercise Prescription  Exercise Prescription based on: Pre-rehab Stress Test; completed 1/8/24  Resistance Training: No   Frequency:  3 days/week  Mode: Treadmill, NuStep, and Recumbent Cycle  Duration: 27 total aerobic minutes  Intensity: RPE 12-16  Target HR:    bpm; THR calculated via exercise Stress Test by program EP  MET Level: 3.9  Patient wears supplemental O2: No  Modality Workload METs Duration (minutes)   Pre-Exercise      Treadmill 2.0 mph @ 2 % 3.1 9 :00   NuStep 4000 3 load @ 60 winters 3.9 9 :00   Recumbent Bike 4 load @ 55 rpms 2.9 9 :00   Post-Exercise         Exercise Plan  Goal Status: In progress  Exercise Goals: Increase exercise MET level by 5-10% each week, Increase total exercise duration to 30-45 minutes, Initiate strength training 2-3 days a week, Initiate flexibility training 2-3 days a week, and Establish a home exercise program before discharge  Exercise Interventions/Education:   1/13/2025 Initial exercise session completed without  complications.  Reviewed ExRx with patient. Based on HR/BP/ RPD/ RPE, intensity/workload was decreased on Treadmill and Recumbent Bike. Patient rating RPE/RPD appropriately.   1/17/25 Duration increased by 1 minute on each modality. Total exercise duration now 27 minutes.     Other Core Components/Risk Factor Reassessment:   Medication Adherence  Medication compliance: Yes  Uses pill box/organizer: Yes   Carries medication list: Yes ; MyChart  Is patient prescribed Nitroglycerin? No  Current Medications:   Medication Documentation Review Audit       Reviewed by Inna Edouard RN (Registered Nurse) on 02/17/25 at 1121      Medication Order Taking? Sig Documenting Provider Last Dose Status   amLODIPine (Norvasc) 5 mg tablet [id number] Yes TAKE 1 TABLET BY MOUTH ONCE DAILY Zohaib Carty MD 1/29/2025 Active   atorvastatin (Lipitor) 80 mg tablet [id number] Yes Take 1 tablet (80 mg) by mouth once daily at bedtime. Zohaib Carty MD 1/28/2025 Active   clopidogrel (Plavix) 75 mg tablet [id number] Yes Take 1 tablet (75 mg) by mouth once daily. Lionel Robledo MD  Active   diphenhydramine HCl (BENADRYL ALLERGY ORAL) [id number] Yes Take 1 tablet by mouth as needed at bedtime. Lacy Guerra MD 1/28/2025 Active   ferrous sulfate 325 (65 Fe) MG EC tablet [id number] Yes Take 1 tablet by mouth every other day. Do not crush, chew, or split. Marco Antonio Downing MD  Active   FLUoxetine (PROzac) 20 mg capsule [id number] Yes Take 1 capsule (20 mg) by mouth once daily. Veronica Bolton MD 1/29/2025 Active   guaiFENesin (Mucinex) 600 mg 12 hr tablet [id number] Yes Take 2 tablets (1,200 mg) by mouth 2 times a day. Do not crush, chew, or split. Marco Antonio Downing MD  Active   losartan (Cozaar) 100 mg tablet [id number] Yes Take 1 tablet (100 mg) by mouth once daily. Zohaib Carty MD 1/29/2025 Active   multivitamin tablet [id number] Yes Take 1 tablet by mouth once daily. Lawrence Lima MD 1/29/2025 Active   pantoprazole (ProtoNix)  "40 mg EC tablet [id number] Yes Take 1 tablet (40 mg) by mouth once daily. Do not crush, chew, or split. Lionel Robledo MD  Active   thiamine 100 mg tablet [id number] Yes Take 1 tablet (100 mg) by mouth once daily. Veronica Bolton MD 1/29/2025 Active                Blood Pressure Management  History of Hypertension: Yes   Medication Changes: No   Resting BP: /60 (Patient Position: Sitting) Comment: post exercisse  Pulse 75      Heart Failure Management  Hx of Heart Failure: No    Smoking/Tobacco Assessment  Social History     Tobacco Use    Smoking status: Every Day     Current packs/day: 0.50     Types: Cigarettes    Smokeless tobacco: Never    Tobacco comments:     Down to 1 cigarette after eating    Substance Use Topics    Alcohol use: Not Currently     Comment: 4 pack Stripe with a shot of vodka for each beer daily    Drug use: Yes     Types: Marijuana      Other Core Component Plan  Goal Status: In progress  Other Core Component Goals: Medication compliance, Verbalize medication usage and drug actions by discharge, Begin tobacco cessation program, Set tobacco cessation quit date while enrolled, and Achieve resting BP of < 130/80 by discharge  Other Core Component Interventions/Education:   1/29/25 Dr. Ying reached out to [facility] to relay that Dorinda would not be able to attend Cardiac Rehab today because patient is being sent to the ED for a hemoglobin of 6.6. Spoke to patient stating rehab staff is aware of her absence and will follow course of care.     Individual Patient Goals:  \"Better health and feel good about the decisions that I'm making.\"  Establish heart healthy diet by discharge from program.  Establish home exercise routine by discharge from program.   Goal Status: In progress    Staff Comments:  Dorinda has completed 4 sessions of Cardiac Rehab. Patient  has not been consistent with attendance due to recent hospitalization for hemoglobin of 6.6. Patient plans to return " to program 2/17/25.  No cardiac complaints have been voiced and no ectopy has been noted on ECG. BP have been WNL.      Rehab Staff Signature: Inna Edouard RN

## 2025-02-17 NOTE — PROGRESS NOTES
Cardiac Rehab Note  Patient: Dorinda Benson   MRN: [id number]     2/17/2025 Tobacco cessation was discussed with patient. Pt with TCC referral already placed from last PCP [facility] and rehab staff scheduled initial visit on 2/24/25 at 3:00 PM.     Signature: Inna Edouard RN

## 2025-02-18 ENCOUNTER — PHARMACY VISIT (OUTPATIENT)
Dept: PHARMACY | Facility: CLINIC | Age: 70
End: 2025-02-18
Payer: COMMERCIAL

## 2025-02-19 ENCOUNTER — APPOINTMENT (OUTPATIENT)
Dept: CARDIAC REHAB | Facility: HOSPITAL | Age: 70
End: 2025-02-19
Payer: MEDICARE

## 2025-02-20 PROCEDURE — RXMED WILLOW AMBULATORY MEDICATION CHARGE

## 2025-02-21 ENCOUNTER — PHARMACY VISIT (OUTPATIENT)
Dept: PHARMACY | Facility: CLINIC | Age: 70
End: 2025-02-21
Payer: COMMERCIAL

## 2025-02-21 ENCOUNTER — CLINICAL SUPPORT (OUTPATIENT)
Dept: CARDIAC REHAB | Facility: HOSPITAL | Age: 70
End: 2025-02-21
Payer: COMMERCIAL

## 2025-02-21 DIAGNOSIS — I21.4 NSTEMI (NON-ST ELEVATED MYOCARDIAL INFARCTION) (MULTI): ICD-10-CM

## 2025-02-21 PROCEDURE — 93798 PHYS/QHP OP CAR RHAB W/ECG: CPT | Performed by: INTERNAL MEDICINE

## 2025-02-24 ENCOUNTER — APPOINTMENT (OUTPATIENT)
Dept: CARDIAC REHAB | Facility: HOSPITAL | Age: 70
End: 2025-02-24
Payer: COMMERCIAL

## 2025-02-24 ENCOUNTER — CLINICAL SUPPORT (OUTPATIENT)
Dept: CARDIAC REHAB | Facility: HOSPITAL | Age: 70
End: 2025-02-24
Payer: MEDICARE

## 2025-02-24 DIAGNOSIS — I21.4 NSTEMI (NON-ST ELEVATED MYOCARDIAL INFARCTION) (MULTI): ICD-10-CM

## 2025-02-24 PROCEDURE — 93798 PHYS/QHP OP CAR RHAB W/ECG: CPT | Performed by: INTERNAL MEDICINE

## 2025-02-24 PROCEDURE — RXMED WILLOW AMBULATORY MEDICATION CHARGE

## 2025-02-24 NOTE — PROGRESS NOTES
Dorinda Benson  1955  [id number]  69 y.o.    [facility] [place] [id number] CARDREHB      Cardiac/Pulmonary Rehab Nutrition Education    2/24/2025  Label reading education was done with program's RDN during today's visit. Label Reading Highlights handout was given with information discussed during appointment and to assist in review of label contents at home.      Signature Seble Moe RDN, LD

## 2025-02-25 ENCOUNTER — PHARMACY VISIT (OUTPATIENT)
Dept: PHARMACY | Facility: CLINIC | Age: 70
End: 2025-02-25
Payer: COMMERCIAL

## 2025-02-26 ENCOUNTER — CLINICAL SUPPORT (OUTPATIENT)
Dept: CARDIAC REHAB | Facility: HOSPITAL | Age: 70
End: 2025-02-26
Payer: MEDICARE

## 2025-02-26 DIAGNOSIS — I21.4 NSTEMI (NON-ST ELEVATED MYOCARDIAL INFARCTION) (MULTI): ICD-10-CM

## 2025-02-26 PROCEDURE — 93798 PHYS/QHP OP CAR RHAB W/ECG: CPT | Performed by: INTERNAL MEDICINE

## 2025-02-28 ENCOUNTER — APPOINTMENT (OUTPATIENT)
Dept: CARDIAC REHAB | Facility: HOSPITAL | Age: 70
End: 2025-02-28
Payer: MEDICARE

## 2025-03-03 ENCOUNTER — APPOINTMENT (OUTPATIENT)
Dept: CARDIAC REHAB | Facility: HOSPITAL | Age: 70
End: 2025-03-03
Payer: MEDICARE

## 2025-03-05 PROCEDURE — RXMED WILLOW AMBULATORY MEDICATION CHARGE

## 2025-03-06 ENCOUNTER — PHARMACY VISIT (OUTPATIENT)
Dept: PHARMACY | Facility: CLINIC | Age: 70
End: 2025-03-06
Payer: COMMERCIAL

## 2025-03-07 ENCOUNTER — APPOINTMENT (OUTPATIENT)
Dept: CARDIAC REHAB | Facility: HOSPITAL | Age: 70
End: 2025-03-07
Payer: MEDICARE

## 2025-03-11 ENCOUNTER — PHARMACY VISIT (OUTPATIENT)
Dept: PHARMACY | Facility: CLINIC | Age: 70
End: 2025-03-11
Payer: MEDICARE

## 2025-03-11 ENCOUNTER — OFFICE VISIT (OUTPATIENT)
Dept: CARDIOLOGY | Facility: HOSPITAL | Age: 70
End: 2025-03-11
Payer: MEDICARE

## 2025-03-11 VITALS
HEIGHT: 64 IN | HEART RATE: 63 BPM | DIASTOLIC BLOOD PRESSURE: 70 MMHG | OXYGEN SATURATION: 99 % | WEIGHT: 158 LBS | BODY MASS INDEX: 26.98 KG/M2 | SYSTOLIC BLOOD PRESSURE: 128 MMHG

## 2025-03-11 DIAGNOSIS — I25.10 CORONARY ARTERIOSCLEROSIS: Primary | ICD-10-CM

## 2025-03-11 DIAGNOSIS — E78.2 MIXED HYPERLIPIDEMIA: ICD-10-CM

## 2025-03-11 DIAGNOSIS — Z95.5 STENTED CORONARY ARTERY: ICD-10-CM

## 2025-03-11 PROCEDURE — G2211 COMPLEX E/M VISIT ADD ON: HCPCS | Performed by: INTERNAL MEDICINE

## 2025-03-11 PROCEDURE — 99213 OFFICE O/P EST LOW 20 MIN: CPT | Performed by: INTERNAL MEDICINE

## 2025-03-11 PROCEDURE — 1159F MED LIST DOCD IN RCRD: CPT | Performed by: INTERNAL MEDICINE

## 2025-03-11 PROCEDURE — RXMED WILLOW AMBULATORY MEDICATION CHARGE

## 2025-03-11 PROCEDURE — 3074F SYST BP LT 130 MM HG: CPT | Performed by: INTERNAL MEDICINE

## 2025-03-11 PROCEDURE — 3078F DIAST BP <80 MM HG: CPT | Performed by: INTERNAL MEDICINE

## 2025-03-11 PROCEDURE — 1123F ACP DISCUSS/DSCN MKR DOCD: CPT | Performed by: INTERNAL MEDICINE

## 2025-03-11 PROCEDURE — 3008F BODY MASS INDEX DOCD: CPT | Performed by: INTERNAL MEDICINE

## 2025-03-11 PROCEDURE — 1160F RVW MEDS BY RX/DR IN RCRD: CPT | Performed by: INTERNAL MEDICINE

## 2025-03-11 PROCEDURE — 1126F AMNT PAIN NOTED NONE PRSNT: CPT | Performed by: INTERNAL MEDICINE

## 2025-03-11 RX ORDER — CLOPIDOGREL BISULFATE 75 MG/1
75 TABLET ORAL DAILY
Qty: 90 TABLET | Refills: 3 | Status: SHIPPED | OUTPATIENT
Start: 2025-03-11

## 2025-03-11 ASSESSMENT — PAIN SCALES - GENERAL: PAINLEVEL_OUTOF10: 0-NO PAIN

## 2025-03-11 ASSESSMENT — ENCOUNTER SYMPTOMS
DEPRESSION: 0
OCCASIONAL FEELINGS OF UNSTEADINESS: 0
LOSS OF SENSATION IN FEET: 0

## 2025-03-11 ASSESSMENT — COLUMBIA-SUICIDE SEVERITY RATING SCALE - C-SSRS
2. HAVE YOU ACTUALLY HAD ANY THOUGHTS OF KILLING YOURSELF?: NO
1. IN THE PAST MONTH, HAVE YOU WISHED YOU WERE DEAD OR WISHED YOU COULD GO TO SLEEP AND NOT WAKE UP?: NO
6. HAVE YOU EVER DONE ANYTHING, STARTED TO DO ANYTHING, OR PREPARED TO DO ANYTHING TO END YOUR LIFE?: NO

## 2025-03-11 NOTE — PROGRESS NOTES
Subjective   Dorinda Benson is a 69 y.o. female who presents to the [facility] for evaluation of coronary artery disease after December 2024 acute MI (RCA culprit lesion).    She presented with chest pain 12/5 evening that resolved before admission to ER. 12/6 ECG showed nonspecific T wave changes but not STEMI ECG criteria. CCTA showed acutely occluded mid RCA and taken emergently to cath lab with CICU overnight observation. LV EF 70% without LV systolic wall motion abnormality.    Admitted 1/29 - 1/31/2025 for large upper GI bleeding (AVM) with attempted clipping x3. Cardiology consulted and antiplatelet medication adjusted to Plavix 75 mg a day indefinitely.     Since discharge, Ms. Benson has no active cardiac symptoms of chest pain, PND, orthopnea, OMAR, palpitations, syncope, or claudication. Notes exertional dyspnea with climbing 1-2 flights of stairs.    Past Medical History:  1. Coronary artery disease s/p 12/6/2024 mid RCA PCI for 100% thrombotic occlusion.  2. Dyslipidemia  3. Hypertension  4. Nicotine dependence    Social History:  Active smoker (< 1/2 pack/day now)    Family History:  No premature CAD in 1st degree relatives ( 55 years of age for male relatives,  65 years of age for female relatives)     Review of Systems    A 14 point review of systems was asked. All questions were negative except for pertinent positives listed in the HPI.     Current Outpatient Medications on File Prior to Visit   Medication Sig Dispense Refill    amLODIPine (Norvasc) 5 mg tablet TAKE 1 TABLET BY MOUTH ONCE DAILY 90 tablet 3    atorvastatin (Lipitor) 80 mg tablet Take 1 tablet (80 mg) by mouth once daily at bedtime. 30 tablet 11    clopidogrel (Plavix) 75 mg tablet Take 1 tablet (75 mg) by mouth once daily. 30 tablet 1    diphenhydramine HCl (BENADRYL ALLERGY ORAL) Take 1 tablet by mouth as needed at bedtime.      ferrous sulfate 325 (65 Fe) mg EC tablet Take 1 tablet by mouth every other day.  "Do not crush, chew, or split. 15 tablet 11    FLUoxetine (PROzac) 20 mg capsule Take 1 capsule (20 mg) by mouth once daily. 30 capsule 1    guaiFENesin (Mucinex) 600 mg 12 hr tablet Take 2 tablets (1,200 mg) by mouth 2 times a day. Do not crush, chew, or split. 120 tablet 2    losartan (Cozaar) 100 mg tablet Take 1 tablet (100 mg) by mouth once daily. 30 tablet 11    multivitamin tablet Take 1 tablet by mouth once daily. 30 tablet 11    pantoprazole (ProtoNix) 40 mg EC tablet Take 1 tablet (40 mg) by mouth once daily. Do not crush, chew, or split. 30 tablet 1    thiamine 100 mg tablet Take 1 tablet (100 mg) by mouth once daily. 30 tablet 11     No current facility-administered medications on file prior to visit.         Objective   Physical Exam  BP Readings from Last 3 Encounters:   03/11/25 128/70   01/24/25 120/60   02/14/25 117/75      Wt Readings from Last 3 Encounters:   03/11/25 71.7 kg (158 lb)   02/14/25 72 kg (158 lb 11.2 oz)   01/30/25 72.8 kg (160 lb 7.9 oz)      BMI: Estimated body mass index is 27.12 kg/m² as calculated from the following:    Height as of this encounter: 1.626 m (5' 4\").    Weight as of this encounter: 71.7 kg (158 lb).  BSA: Estimated body surface area is 1.8 meters squared as calculated from the following:    Height as of this encounter: 1.626 m (5' 4\").    Weight as of this encounter: 71.7 kg (158 lb).    General: no acute distress  HEENT: EOMI, no scleral icterus.  Lungs: Clear to auscultation bilaterally without wheezing, rales, or rhonchi.  Cardiovascular: Regular rhythm and rate. Normal S1 and S2. No murmurs, rubs, or gallops are appreciated. JVP normal.  Abdomen: Soft, nontender, nondistended. Bowel sounds present.  Extremities: Warm and well perfused with equal 2+ pulses bilaterally.  No edema present.  Neurologic: Alert and oriented x3.      I have personally reviewed the following images and laboratory findings:  Last echocardiogram:  12/6/2024 echocardiogram: 70-75%, normal " "LV size, no LVH (LVMI 76 gm/m2), normal diastology (E/e' 9, E/A 0.74), normal LA size (ANAMARIA 28 ml/m2), negative bubble study for PFO, normal RV/RA, trace AI, trace MR, trace TR, RVSP 31 mm Hg (RAP 3 mm Hg).    Last cath / stress test:  2024 LHC: LMCA: < 30% plaque; LAD: < 30% plaque; LCx: < 30% plaque; RCA: 100% mid acute thrombotic lesion s/p [place] 3 x 22 mm JIM with 0% residual stenosis.    2024 CCTA: Acute mid RCA occlusion; otherwise normal coronary arteries.    Most recent EC2025 ECG: Sinus rhythm, 73 bpm, normal ECG. Personally reviewed in office.    Lab Results   Component Value Date    CHOL 272 (H) 2024    CHOL 226 (H) 2024    CHOL 241 (H) 2023    CHOL 241 (H) 2023     Lab Results   Component Value Date    HDL 73.5 2024    HDL 65.4 2024    HDL 59.3 2023    HDL 60.2 2023     Lab Results   Component Value Date    LDLCALC 161 (H) 2024    LDLCALC 146 (H) 2023     Lab Results   Component Value Date    TRIG 190 (H) 2024    TRIG 174 (H) 2023     No components found for: \"CHOLHDL\"      Assessment/Plan   1. CAD:  S/p JIM to mid RCA 2024. Continue Plavix 75 mg a day indefinitely (replaced DAPT [ticagrelor 90 mg twice a day and aspirin 81 mg a day] late 2025 for large acute upper GI bleeding from AVMs).     For dyslipidemia, continue atorvastatin 80 mg a day. Goal LDL < 70    Repeat fasting lipid panel now.    Blood pressure at goal range 120-130 mm Hg now taking amlodipine 5 mg a day and losartan 100 mg. Continue both medications for hypertension at current dose.    2. Nicotine dependence:  Smoking cessation counseled for > 3 minutes.    Follow up with Dr. Carty in 6 months        SIGNATURE: [facility]  Senior Attending, Division of Cardiovascular Medicine  Co-Director, [facility]   of Medicine  [facility] " [facility]  [address]  [place] [address]  Phone: [phone]  Fax: [phone]  Appointment: [phone]  PATIENT NAME: Dorinda Benson   DATE/TIME: March 11, 2025 3:27 PM MRN: [id number]

## 2025-03-11 NOTE — PATIENT INSTRUCTIONS
You were seen at the [facility] for a check up of your heart.     For your right coronary artery heart stent, continue Plavix 75 mg a day that was started in place of your prior Brilinta 90 mg twice a day and aspirin 81 mg a day because of a large bleeding AVM from your stomach. You cannot miss any doses of Plavix in the next 9 months until the end of December 2025 to prevent a blood clot forming inside your heart stent which would cause a massive heart attack.    Continue atorvastatin 80 mg a day to lower your cholesterol. Repeat fasting cholesterol lab work before our next visit. I ordered today.    Continue amlodipine 5 mg a day and losartan 100 mg a day for blood pressure.     Check your blood pressure every morning about 30-60 minutes after taking your morning blood pressure medications and keep a log book. Sit for 3-5 minutes in a chair to relax and place your arm on a table or counter to be level with your heart. Goal blood pressure range for you is 120-130 mm Hg.     Eat a heart healthy diet. Limit portions of red meat. Eat fresh fruits and vegetables instead of processed carbohydrates. Olive oil (1 tablespoon a day), avocados, tree nuts as a source of  omega-3 fat. Beans and legumes are good sources of plant based protein and fiber. The Mediterranean diet has been shown in clinical trials to reduce risk of heart disease by following these principles.     Follow up with Dr. Carty in 6 months

## 2025-03-12 ENCOUNTER — CLINICAL SUPPORT (OUTPATIENT)
Dept: CARDIAC REHAB | Facility: HOSPITAL | Age: 70
End: 2025-03-12
Payer: MEDICARE

## 2025-03-12 DIAGNOSIS — I21.4 NSTEMI (NON-ST ELEVATED MYOCARDIAL INFARCTION) (MULTI): ICD-10-CM

## 2025-03-12 PROCEDURE — 93798 PHYS/QHP OP CAR RHAB W/ECG: CPT | Performed by: INTERNAL MEDICINE

## 2025-03-12 NOTE — PROGRESS NOTES
Cardiac Rehab Note  Patient: Dorinda Benson   MRN: [id number]     3/12/2025 Effects of stress on the body, risks of unmanaged stress and techniques to minimize anxieties were all discussed in today's session. Handout was given for reference with resources to  Behavioral Health Services should patient need further assistance with stress management. Patient encouraged to notify staff if stressors increase and additional help is required.      Signature: Inna Edouard RN

## 2025-03-13 LAB
CHOLEST SERPL-MCNC: 147 MG/DL
CHOLEST/HDLC SERPL: 2.2 (CALC)
HDLC SERPL-MCNC: 68 MG/DL
LDLC SERPL CALC-MCNC: 59 MG/DL (CALC)
NONHDLC SERPL-MCNC: 79 MG/DL (CALC)
TRIGL SERPL-MCNC: 121 MG/DL

## 2025-03-14 ENCOUNTER — CLINICAL SUPPORT (OUTPATIENT)
Dept: CARDIAC REHAB | Facility: HOSPITAL | Age: 70
End: 2025-03-14
Payer: MEDICARE

## 2025-03-14 DIAGNOSIS — I21.4 NSTEMI (NON-ST ELEVATED MYOCARDIAL INFARCTION) (MULTI): ICD-10-CM

## 2025-03-14 PROCEDURE — 93798 PHYS/QHP OP CAR RHAB W/ECG: CPT | Performed by: INTERNAL MEDICINE

## 2025-03-17 ENCOUNTER — CLINICAL SUPPORT (OUTPATIENT)
Dept: CARDIAC REHAB | Facility: HOSPITAL | Age: 70
End: 2025-03-17
Payer: MEDICARE

## 2025-03-17 DIAGNOSIS — I21.4 NSTEMI (NON-ST ELEVATED MYOCARDIAL INFARCTION) (MULTI): ICD-10-CM

## 2025-03-17 PROCEDURE — 93798 PHYS/QHP OP CAR RHAB W/ECG: CPT | Performed by: INTERNAL MEDICINE

## 2025-03-19 ENCOUNTER — CLINICAL SUPPORT (OUTPATIENT)
Dept: CARDIAC REHAB | Facility: HOSPITAL | Age: 70
End: 2025-03-19
Payer: MEDICARE

## 2025-03-19 DIAGNOSIS — I21.4 NSTEMI (NON-ST ELEVATED MYOCARDIAL INFARCTION) (MULTI): ICD-10-CM

## 2025-03-19 PROCEDURE — 93798 PHYS/QHP OP CAR RHAB W/ECG: CPT | Performed by: INTERNAL MEDICINE

## 2025-03-20 ENCOUNTER — DOCUMENTATION (OUTPATIENT)
Dept: CARDIAC REHAB | Facility: HOSPITAL | Age: 70
End: 2025-03-20
Payer: MEDICARE

## 2025-03-20 VITALS — SYSTOLIC BLOOD PRESSURE: 126 MMHG | DIASTOLIC BLOOD PRESSURE: 72 MMHG | HEART RATE: 72 BPM

## 2025-03-20 DIAGNOSIS — I21.4 NSTEMI (NON-ST ELEVATED MYOCARDIAL INFARCTION) (MULTI): Primary | ICD-10-CM

## 2025-03-20 NOTE — PROGRESS NOTES
Cardiac Rehabilitation 60 Day Reassessment    Name: Dorinda Benson   Medical Record Number: [id number]  YOB: 1955   Age: 69 y.o.  Today’s Date: 3/20/2025   Primary Care Physician: Veronica Bolton MD   Referring Physician: Harris De La Rosa MD / Zohaib Carty MD  Program Location: [address]  Primary Diagnosis: NSTEMI (non-ST elevated myocardial infarction) (Multi) [I21.4]   Onset/Date of Diagnosis: 12/06/24   Session #: 12 / 36  AACVPR Risk Stratification: High   Falls Risk: Low    Psychosocial Reassessment:    Stress Assessment  Pre PHQ-9: 11  No data recorded   Sent PH-Q 9 to MD if score > 20: No; score < 20    Quality of Life Survey: SF-36   SF-36 Pre Post   Physical Component Score completed TBD   Mental Component Score completed TBD     Pt reported/currently experiencing stress: Yes; Stress; Severity: moderate  Patient uses stress management skills: No   History of: no history of anxiety or depression  Currently seeing a mental health provider: No  Social Support: Yes, Whom:family    Learning Assessment  Learning assessment/barriers: None  Preferred learning method: Auditory, Visual, Reading handout, and Writing handout  Barriers: None  Comments:    Stages of Change: Action    Psychosocial Plan  Goal Status: In progress  Psychosocial Goals: Demonstrating proper techniques for stress management and Maintain or lower PH-Q 9 score by discharge  Psychosocial Interventions/Education:   2/17/25 No change in psychosocial status as reported by patient.  3/12/2025 Effects of stress on the body, risks of unmanaged stress and techniques to minimize anxieties were all discussed in today's session. Handout was given for reference with resources to  Behavioral Health Services should patient need further assistance with stress management. Patient encouraged to notify staff if stressors increase and additional help is required.    3/19/25 Patient reports decrease in stress and  depression symptoms recently.     Nutrition Reassessment:   Diet Habit Survey: Picture Your Plate  Pre:  37  Post: To be done at discharge.    Survey results reviewed with Dietician: Not at this time    Lipids Assessment  Current Dietary Guidelines: Low fat  Barriers to dietary change: no  Hyperlipidemia: Yes   Lab Results   Component Value Date    CHOL 147 03/12/2025    HDL 68 03/12/2025    LDLCALC 59 03/12/2025    TRIG 121 03/12/2025     Diabetes Assessment  History of Diabetes: No  Lab Results   Component Value Date    HGBA1C 5.8 (H) 12/06/2024      Weight Management      /Nutrition Plan  Goal Status: In progress  Nutrition Goals: Lipid Goal: HDL>45, LDL <70, Total <180, Trigs <150, Improve Diet Habit Survey score by 5-10 points by discharge, Adapt a Mediterranean focused diet prior to discharge, Learn how to read and interpret nutrition labels prior to discharge, and Lose 1lb/week while enrolled in program  Nutrition Interventions/Education:   1/15/2025 Patient attended Heart Healthy Diet lecture with program RDN during today's exercise session. Heart Healthy diet tips sheet was given for further review at home and patient was encouraged to come back with any follow up questions.   2/24/2025  Label reading education was done with program's RDN during today's visit. Label Reading Highlights handout was given with information discussed during appointment and to assist in review of label contents at home. Signature Seble Moe RDN, LD    Exercise Reassessment:   Home Exercise  Current Home Exercise?: No  Mode: NA  Frequency: NA  Duration: NA   Home Exercise Prescription given: To be given prior to discharge from program.    Exercise Prescription  Exercise Prescription based on: Pre-rehab Stress Test; completed 1/8/24  Resistance Training: No   Frequency:  3 days/week  Mode: Treadmill, NuStep, and Recumbent Cycle  Duration: 30 total aerobic minutes  Intensity: RPE 12-16  Target HR:    bpm; THR calculated  via exercise Stress Test by program EP  MET Level: 4.2  Patient wears supplemental O2: No  Modality Workload METs Duration (minutes)   Pre-Exercise      Treadmill 3.0 mph @ 2 % 4.2 10 :00   NuStep 4000 3 load @ 60 winters 3.9 10 :00   Recumbent Bike 4 load @ 55 rpms 2.9 10 :00   Post-Exercise         Exercise Plan  Goal Status: In progress  Exercise Goals: Increase exercise MET level by 5-10% each week, Increase total exercise duration to 30-45 minutes, Initiate strength training 2-3 days a week, Initiate flexibility training 2-3 days a week, and Establish a home exercise program before discharge  Exercise Interventions/Education:   1/13/2025 Initial exercise session completed without complications.  Reviewed ExRx with patient. Based on HR/BP/ RPD/ RPE, intensity/workload was decreased on Treadmill and Recumbent Bike. Patient rating RPE/RPD appropriately.   1/17/25 Duration increased by 1 minute on each modality. Total exercise duration now 27 minutes.   3/20/25 Pt has increase both duration and intensity on all modalities. Pt rating RPE/RPD appropriately.     Other Core Components/Risk Factor Reassessment:   Medication Adherence  Medication compliance: Yes  Uses pill box/organizer: Yes   Carries medication list: Yes ; MyChart  Is patient prescribed Nitroglycerin? No  Current Medications:   Medication Documentation Review Audit       Reviewed by Inna Edouard RN (Registered Nurse) on 03/20/25 at 1554      Medication Order Taking? Sig Documenting Provider Last Dose Status   amLODIPine (Norvasc) 5 mg tablet [id number] Yes TAKE 1 TABLET BY MOUTH ONCE DAILY Zohaib Carty MD 1/29/2025 Active   atorvastatin (Lipitor) 80 mg tablet [id number] Yes Take 1 tablet (80 mg) by mouth once daily at bedtime. Zohaib Carty MD 1/28/2025 Active   clopidogrel (Plavix) 75 mg tablet [id number] Yes Take 1 tablet (75 mg) by mouth once daily. Zohaib Carty MD  Active   diphenhydramine HCl (BENADRYL ALLERGY ORAL) [id number] Yes Take 1  tablet by mouth as needed at bedtime. Lacy Guerra MD 1/28/2025 Active   ferrous sulfate 325 (65 Fe) mg EC tablet [id number] Yes Take 1 tablet by mouth every other day. Do not crush, chew, or split. Marco Antonio Downing MD  Active   FLUoxetine (PROzac) 20 mg capsule [id number] Yes Take 1 capsule (20 mg) by mouth once daily. Veronica Bolton MD 1/29/2025 Active   guaiFENesin (Mucinex) 600 mg 12 hr tablet [id number] Yes Take 2 tablets (1,200 mg) by mouth 2 times a day. Do not crush, chew, or split. Marco Antonio Downing MD  Active   losartan (Cozaar) 100 mg tablet [id number] Yes Take 1 tablet (100 mg) by mouth once daily. Zohaib Carty MD 1/29/2025 Active   multivitamin tablet [id number] Yes Take 1 tablet by mouth once daily. Lawrence Lima MD 1/29/2025 Active   pantoprazole (ProtoNix) 40 mg EC tablet [id number] Yes Take 1 tablet (40 mg) by mouth once daily. Do not crush, chew, or split. Lionel Robledo MD  Active   thiamine 100 mg tablet [id number] Yes Take 1 tablet (100 mg) by mouth once daily. Veronica Bolton MD 1/29/2025 Active                Blood Pressure Management  History of Hypertension: Yes   Medication Changes: No   Resting BP: /72 (Patient Position: Sitting) Comment: post exercise  Pulse 72      Heart Failure Management  Hx of Heart Failure: No    Smoking/Tobacco Assessment  Social History     Tobacco Use    Smoking status: Every Day     Current packs/day: 0.50     Types: Cigarettes    Smokeless tobacco: Never    Tobacco comments:     Down to 1 cigarette after eating    Substance Use Topics    Alcohol use: Not Currently     Comment: 4 pack Stripe with a shot of vodka for each beer daily    Drug use: Yes     Types: Marijuana      Other Core Component Plan  Goal Status: In progress  Other Core Component Goals: Medication compliance, Verbalize medication usage and drug actions by discharge, Begin tobacco cessation program, Set tobacco cessation quit date while enrolled, and Achieve resting BP  "of < 130/80 by discharge  Other Core Component Interventions/Education:   1/29/25 Dr. Ying reached out to [facility] to relay that Dorinda would not be able to attend Cardiac Rehab today because patient is being sent to the ED for a hemoglobin of 6.6. Spoke to patient stating rehab staff is aware of her absence and will follow course of care.   2/17/2025 Tobacco cessation was discussed with patient. Pt with TCC referral already placed from last PCP [facility]. In process of setting up appointment    Individual Patient Goals:  \"Better health and feel good about the decisions that I'm making.\"  Establish heart healthy diet by discharge from program.  Establish home exercise routine by discharge from program.   Goal Status: In progress    Staff Comments:  Dorinda has completed 12 sessions of Cardiac Rehab. Patient has been consistent with attendance after returning to program s/p hospitalization for symptomatic anemia. After resuming exercise patient has been able to increase her exercise intensity and duration on all modalities.  No cardiac complaints have been voiced and no ectopy has been noted on ECG. BP have been WNL.      Rehab Staff Signature: Inna Edouard RN   "

## 2025-03-21 ENCOUNTER — PHARMACY VISIT (OUTPATIENT)
Dept: PHARMACY | Facility: CLINIC | Age: 70
End: 2025-03-21
Payer: COMMERCIAL

## 2025-03-21 ENCOUNTER — CLINICAL SUPPORT (OUTPATIENT)
Dept: CARDIAC REHAB | Facility: HOSPITAL | Age: 70
End: 2025-03-21
Payer: MEDICARE

## 2025-03-21 DIAGNOSIS — K92.1 MELENA: ICD-10-CM

## 2025-03-21 DIAGNOSIS — I21.4 NSTEMI (NON-ST ELEVATED MYOCARDIAL INFARCTION) (MULTI): ICD-10-CM

## 2025-03-21 PROCEDURE — 93798 PHYS/QHP OP CAR RHAB W/ECG: CPT | Performed by: INTERNAL MEDICINE

## 2025-03-21 PROCEDURE — RXMED WILLOW AMBULATORY MEDICATION CHARGE

## 2025-03-24 ENCOUNTER — PHARMACY VISIT (OUTPATIENT)
Dept: PHARMACY | Facility: CLINIC | Age: 70
End: 2025-03-24
Payer: COMMERCIAL

## 2025-03-24 ENCOUNTER — CLINICAL SUPPORT (OUTPATIENT)
Dept: CARDIAC REHAB | Facility: HOSPITAL | Age: 70
End: 2025-03-24
Payer: MEDICARE

## 2025-03-24 DIAGNOSIS — I21.4 NSTEMI (NON-ST ELEVATED MYOCARDIAL INFARCTION) (MULTI): ICD-10-CM

## 2025-03-24 PROCEDURE — RXMED WILLOW AMBULATORY MEDICATION CHARGE

## 2025-03-24 PROCEDURE — 93798 PHYS/QHP OP CAR RHAB W/ECG: CPT | Performed by: INTERNAL MEDICINE

## 2025-03-24 RX ORDER — PANTOPRAZOLE SODIUM 40 MG/1
40 TABLET, DELAYED RELEASE ORAL DAILY
Qty: 90 TABLET | Refills: 1 | Status: SHIPPED | OUTPATIENT
Start: 2025-03-24

## 2025-03-26 ENCOUNTER — CLINICAL SUPPORT (OUTPATIENT)
Dept: CARDIAC REHAB | Facility: HOSPITAL | Age: 70
End: 2025-03-26
Payer: MEDICARE

## 2025-03-26 DIAGNOSIS — F41.9 ANXIETY: ICD-10-CM

## 2025-03-26 DIAGNOSIS — I21.4 NSTEMI (NON-ST ELEVATED MYOCARDIAL INFARCTION) (MULTI): ICD-10-CM

## 2025-03-26 PROCEDURE — RXMED WILLOW AMBULATORY MEDICATION CHARGE

## 2025-03-26 PROCEDURE — 93798 PHYS/QHP OP CAR RHAB W/ECG: CPT | Performed by: INTERNAL MEDICINE

## 2025-03-26 RX ORDER — FLUOXETINE HYDROCHLORIDE 20 MG/1
20 CAPSULE ORAL DAILY
Qty: 30 CAPSULE | Refills: 1 | Status: SHIPPED | OUTPATIENT
Start: 2025-03-26 | End: 2025-05-25

## 2025-03-28 ENCOUNTER — PHARMACY VISIT (OUTPATIENT)
Dept: PHARMACY | Facility: CLINIC | Age: 70
End: 2025-03-28
Payer: COMMERCIAL

## 2025-03-28 ENCOUNTER — CLINICAL SUPPORT (OUTPATIENT)
Dept: CARDIAC REHAB | Facility: HOSPITAL | Age: 70
End: 2025-03-28
Payer: MEDICARE

## 2025-03-28 DIAGNOSIS — I21.4 NSTEMI (NON-ST ELEVATED MYOCARDIAL INFARCTION) (MULTI): ICD-10-CM

## 2025-03-28 PROCEDURE — 93798 PHYS/QHP OP CAR RHAB W/ECG: CPT | Performed by: INTERNAL MEDICINE

## 2025-03-28 PROCEDURE — RXMED WILLOW AMBULATORY MEDICATION CHARGE

## 2025-03-28 NOTE — PROGRESS NOTES
Cardiac Rehab Note  Patient: Dorinda Benson   MRN: [id number]     3/28/2025 Tobacco cessation was discussed with patient. Pt plans to attempt to wean herself off of cigarettes and refused TCC at this time. She plans to work with rehab staff through cessation process.     Signature: Inna Edouard RN

## 2025-03-29 ENCOUNTER — PHARMACY VISIT (OUTPATIENT)
Dept: PHARMACY | Facility: CLINIC | Age: 70
End: 2025-03-29
Payer: COMMERCIAL

## 2025-04-04 PROCEDURE — RXMED WILLOW AMBULATORY MEDICATION CHARGE

## 2025-04-05 ENCOUNTER — PHARMACY VISIT (OUTPATIENT)
Dept: PHARMACY | Facility: CLINIC | Age: 70
End: 2025-04-05
Payer: COMMERCIAL

## 2025-04-07 ENCOUNTER — APPOINTMENT (OUTPATIENT)
Dept: CARDIAC REHAB | Facility: HOSPITAL | Age: 70
End: 2025-04-07
Payer: COMMERCIAL

## 2025-04-07 DIAGNOSIS — I21.4 NSTEMI (NON-ST ELEVATED MYOCARDIAL INFARCTION) (MULTI): ICD-10-CM

## 2025-04-07 PROCEDURE — 93798 PHYS/QHP OP CAR RHAB W/ECG: CPT | Performed by: INTERNAL MEDICINE

## 2025-04-09 ENCOUNTER — APPOINTMENT (OUTPATIENT)
Dept: CARDIAC REHAB | Facility: HOSPITAL | Age: 70
End: 2025-04-09
Payer: COMMERCIAL

## 2025-04-09 DIAGNOSIS — I21.4 NSTEMI (NON-ST ELEVATED MYOCARDIAL INFARCTION) (MULTI): ICD-10-CM

## 2025-04-09 PROCEDURE — 93798 PHYS/QHP OP CAR RHAB W/ECG: CPT | Performed by: INTERNAL MEDICINE

## 2025-04-11 ENCOUNTER — APPOINTMENT (OUTPATIENT)
Dept: CARDIAC REHAB | Facility: HOSPITAL | Age: 70
End: 2025-04-11
Payer: COMMERCIAL

## 2025-04-14 ENCOUNTER — APPOINTMENT (OUTPATIENT)
Dept: CARDIAC REHAB | Facility: HOSPITAL | Age: 70
End: 2025-04-14
Payer: COMMERCIAL

## 2025-04-16 ENCOUNTER — DOCUMENTATION (OUTPATIENT)
Dept: CARDIAC REHAB | Facility: HOSPITAL | Age: 70
End: 2025-04-16

## 2025-04-16 ENCOUNTER — APPOINTMENT (OUTPATIENT)
Dept: CARDIAC REHAB | Facility: HOSPITAL | Age: 70
End: 2025-04-16
Payer: COMMERCIAL

## 2025-04-16 VITALS — DIASTOLIC BLOOD PRESSURE: 72 MMHG | SYSTOLIC BLOOD PRESSURE: 126 MMHG | HEART RATE: 65 BPM

## 2025-04-16 DIAGNOSIS — I21.4 NSTEMI (NON-ST ELEVATED MYOCARDIAL INFARCTION) (MULTI): ICD-10-CM

## 2025-04-16 PROCEDURE — 93798 PHYS/QHP OP CAR RHAB W/ECG: CPT | Performed by: INTERNAL MEDICINE

## 2025-04-16 NOTE — PROGRESS NOTES
Dorinda Marcelino  1955  [id number]  69 y.o.    [facility] [id number] CARDREHB      Cardiac/Pulmonary Rehab Nutrition Education    4/16/2025  Label reading education was done with program's RDN during today's visit. Label Reading Highlights handout was given with information discussed during appointment and to assist in review of label contents at home.      Signature Seble Moe RDN, LD

## 2025-04-17 NOTE — PROGRESS NOTES
Cardiac Rehabilitation 90 Day Reassessment    Name: Dorinda Benson   Medical Record Number: [id number]  YOB: 1955   Age: 69 y.o.  Today’s Date: 4/17/2025   Primary Care Physician: Veronica Bolton MD   Referring Physician: Harris De La Rosa MD / Zohaib Carty MD  Program Location: [address]  Primary Diagnosis: NSTEMI (non-ST elevated myocardial infarction) (Multi) [I21.4]   Onset/Date of Diagnosis: 12/06/24   Session #: 19 / 36  AACVPR Risk Stratification: High   Falls Risk: Low    Psychosocial Reassessment:    Stress Assessment  Pre PHQ-9: 11  No data recorded   Sent PH-Q 9 to MD if score > 20: No; score < 20    Quality of Life Survey: SF-36   SF-36 Pre Post   Physical Component Score completed TBD   Mental Component Score completed TBD     Pt reported/currently experiencing stress: Yes; Stress; Severity: moderate  Patient uses stress management skills: No   History of: no history of anxiety or depression  Currently seeing a mental health provider: No  Social Support: Yes, Whom:family    Learning Assessment  Learning assessment/barriers: None  Preferred learning method: Auditory, Visual, Reading handout, and Writing handout  Barriers: None  Comments:    Stages of Change: Action    Psychosocial Plan  Goal Status: In progress  Psychosocial Goals: Demonstrating proper techniques for stress management and Maintain or lower PH-Q 9 score by discharge  Psychosocial Interventions/Education:   2/17/25 No change in psychosocial status as reported by patient.  3/12/2025 Effects of stress on the body, risks of unmanaged stress and techniques to minimize anxieties were all discussed in today's session. Handout was given for reference with resources to  Behavioral Health Services should patient need further assistance with stress management. Patient encouraged to notify staff if stressors increase and additional help is required.    3/19/25 Patient reports decrease in stress and  depression symptoms recently.   4/17/25 Stressors with work reported but patient denies change in her stress or depression symptoms. She states that exercise has been helping.   Nutrition Reassessment:   Diet Habit Survey: Picture Your Plate  Pre:  37  Post: To be done at discharge.    Survey results reviewed with Dietician: Not at this time    Lipids Assessment  Current Dietary Guidelines: Low fat  Barriers to dietary change: no  Hyperlipidemia: Yes   Lab Results   Component Value Date    CHOL 147 03/12/2025    HDL 68 03/12/2025    LDLCALC 59 03/12/2025    TRIG 121 03/12/2025     Diabetes Assessment  History of Diabetes: No  Lab Results   Component Value Date    HGBA1C 5.8 (H) 12/06/2024      Weight Management      /Nutrition Plan  Goal Status: In progress  Nutrition Goals: Lipid Goal: HDL>45, LDL <70, Total <180, Trigs <150, Improve Diet Habit Survey score by 5-10 points by discharge, Adapt a Mediterranean focused diet prior to discharge, Learn how to read and interpret nutrition labels prior to discharge, and Lose 1lb/week while enrolled in program  Nutrition Interventions/Education:   1/15/2025 Patient attended Heart Healthy Diet lecture with program RDN during today's exercise session. Heart Healthy diet tips sheet was given for further review at home and patient was encouraged to come back with any follow up questions.   2/24/2025  Label reading education was done with program's RDN during today's visit. Label Reading Highlights handout was given with information discussed during appointment and to assist in review of label contents at home. Signature Seble Moe RDN, JAZMINE  4/16/2025  Label reading education was done with program's RDN during today's visit. Label Reading Highlights handout was given with information discussed during appointment and to assist in review of label contents at home.  Signature Seble Moe RDN, LD    Exercise Reassessment:   Home Exercise  Current Home Exercise?: No  Mode:  NA  Frequency: NA  Duration: NA   Home Exercise Prescription given: To be given prior to discharge from program.    Exercise Prescription  Exercise Prescription based on: Pre-rehab Stress Test; completed 1/8/24  Resistance Training: No   Frequency:  3 days/week  Mode: Treadmill, NuStep, and Recumbent Cycle  Duration: 36 total aerobic minutes  Intensity: RPE 12-16  Target HR:    bpm; THR calculated via exercise Stress Test by program EP  MET Level: 4.2  Patient wears supplemental O2: No  Modality Workload METs Duration (minutes)   Pre-Exercise      Treadmill 3.0 mph @ 2 % 4.2 12 :00   NuStep 4000 3 load @ 60 winters 3.9 12 :00   Recumbent Bike 5 load @ 55 rpms 3.1 12 :00   Post-Exercise         Exercise Plan  Goal Status: In progress  Exercise Goals: Increase exercise MET level by 5-10% each week, Increase total exercise duration to 30-45 minutes, Initiate strength training 2-3 days a week, Initiate flexibility training 2-3 days a week, and Establish a home exercise program before discharge  Exercise Interventions/Education:   1/13/2025 Initial exercise session completed without complications.  Reviewed ExRx with patient. Based on HR/BP/ RPD/ RPE, intensity/workload was decreased on Treadmill and Recumbent Bike. Patient rating RPE/RPD appropriately.   1/17/25 Duration increased by 1 minute on each modality. Total exercise duration now 27 minutes.   3/20/25 Pt has increase both duration and intensity on all modalities. Pt rating RPE/RPD appropriately.   4/17/25 Updated ExRx based on patient's RPD/RPE. Patient rating appropriately.      Other Core Components/Risk Factor Reassessment:   Medication Adherence  Medication compliance: Yes  Uses pill box/organizer: Yes   Carries medication list: Yes ; MyChart  Is patient prescribed Nitroglycerin? No  Current Medications:   Medication Documentation Review Audit       Reviewed by Inna Edouard RN (Registered Nurse) on 04/16/25 at 8753      Medication Order Taking? Sig  Documenting Provider Last Dose Status   amLODIPine (Norvasc) 5 mg tablet [id number] Yes TAKE 1 TABLET BY MOUTH ONCE DAILY Zohaib Carty MD 1/29/2025 Active   atorvastatin (Lipitor) 80 mg tablet [id number] Yes Take 1 tablet (80 mg) by mouth once daily at bedtime. Zohaib Carty MD 1/28/2025 Active   clopidogrel (Plavix) 75 mg tablet [id number] Yes Take 1 tablet (75 mg) by mouth once daily. Zohaib Carty MD  Active   diphenhydramine HCl (BENADRYL ALLERGY ORAL) [id number] Yes Take 1 tablet by mouth as needed at bedtime. Lacy Guerra MD 1/28/2025 Active   ferrous sulfate 325 (65 Fe) mg EC tablet [id number] Yes Take 1 tablet by mouth every other day. Do not crush, chew, or split. Marco Antonio Downing MD  Active   FLUoxetine (PROzac) 20 mg capsule [id number] Yes Take 1 capsule (20 mg) by mouth once daily. Nayeli Garcia MD  Active   guaiFENesin (Mucinex) 600 mg 12 hr tablet [id number] Yes Take 2 tablets (1,200 mg) by mouth 2 times a day. Do not crush, chew, or split. Marco Antonio Dowinng MD  Active   losartan (Cozaar) 100 mg tablet [id number] Yes Take 1 tablet (100 mg) by mouth once daily. Zohaib Carty MD 1/29/2025 Active   multivitamin tablet [id number] Yes Take 1 tablet by mouth once daily. Lawrence Lima MD 1/29/2025 Active   pantoprazole (ProtoNix) 40 mg EC tablet [id number] Yes Take 1 tablet (40 mg) by mouth once daily. Do not crush, chew, or split. Stan Aquino MD  Active   thiamine 100 mg tablet [id number] Yes Take 1 tablet (100 mg) by mouth once daily. Veronica Bolton MD 1/29/2025 Active                Blood Pressure Management  History of Hypertension: Yes   Medication Changes: No   Resting BP: /72 (Patient Position: Sitting) Comment: post exercise  Pulse 65      Heart Failure Management  Hx of Heart Failure: No    Smoking/Tobacco Assessment  Social History     Tobacco Use    Smoking status: Every Day     Current packs/day: 0.50     Types: Cigarettes    Smokeless tobacco: Never    Tobacco  "comments:     Down to 1 cigarette after eating    Substance Use Topics    Alcohol use: Not Currently     Comment: 4 pack Stripe with a shot of vodka for each beer daily    Drug use: Yes     Types: Marijuana      Other Core Component Plan  Goal Status: In progress  Other Core Component Goals: Medication compliance, Verbalize medication usage and drug actions by discharge, Begin tobacco cessation program, Set tobacco cessation quit date while enrolled, and Achieve resting BP of < 130/80 by discharge  Other Core Component Interventions/Education:   1/29/25 Dr. Ying reached out to [facility] to relay that Dorinda would not be able to attend Cardiac Rehab today because patient is being sent to the ED for a hemoglobin of 6.6. Spoke to patient stating rehab staff is aware of her absence and will follow course of care.   2/17/2025 Tobacco cessation was discussed with patient. Pt with TCC referral already placed from last PCP [facility]. In process of setting up appointment.   3/28/2025 Tobacco cessation was discussed with patient. Pt plans to attempt to wean herself off of cigarettes and refused TCC at this time. She plans to work with rehab staff through cessation process.     Individual Patient Goals:  \"Better health and feel good about the decisions that I'm making.\"  Establish heart healthy diet by discharge from program.  Establish home exercise routine by discharge from program.   Goal Status: In progress    Staff Comments:  Dorinda has completed 19 sessions of Cardiac Rehab. Patient has not been consistent with attendance lately due to illness and work scheduling conflicts. However, she does remain enthusiastic about the program and has been attending at least 2 days a week. Patient maintains veronica MET level of 4.2 but has increased her exercise duration by 6 minutes.   No cardiac complaints have been voiced and no ectopy has been noted on ECG. BP have been WNL.      Rehab Staff Signature: Inna Edouard RN   "

## 2025-04-18 ENCOUNTER — APPOINTMENT (OUTPATIENT)
Dept: CARDIAC REHAB | Facility: HOSPITAL | Age: 70
End: 2025-04-18
Payer: COMMERCIAL

## 2025-04-18 ENCOUNTER — APPOINTMENT (OUTPATIENT)
Dept: CARDIAC REHAB | Facility: HOSPITAL | Age: 70
End: 2025-04-18
Payer: MEDICARE

## 2025-04-21 ENCOUNTER — APPOINTMENT (OUTPATIENT)
Dept: CARDIAC REHAB | Facility: HOSPITAL | Age: 70
End: 2025-04-21
Payer: COMMERCIAL

## 2025-04-23 ENCOUNTER — APPOINTMENT (OUTPATIENT)
Dept: CARDIAC REHAB | Facility: HOSPITAL | Age: 70
End: 2025-04-23
Payer: COMMERCIAL

## 2025-04-25 ENCOUNTER — APPOINTMENT (OUTPATIENT)
Dept: CARDIAC REHAB | Facility: HOSPITAL | Age: 70
End: 2025-04-25
Payer: COMMERCIAL

## 2025-04-28 ENCOUNTER — APPOINTMENT (OUTPATIENT)
Dept: CARDIAC REHAB | Facility: HOSPITAL | Age: 70
End: 2025-04-28
Payer: COMMERCIAL

## 2025-04-28 PROCEDURE — RXMED WILLOW AMBULATORY MEDICATION CHARGE

## 2025-04-30 ENCOUNTER — TELEPHONE (OUTPATIENT)
Dept: RADIOLOGY | Facility: HOSPITAL | Age: 70
End: 2025-04-30
Payer: MEDICARE

## 2025-04-30 ENCOUNTER — PHARMACY VISIT (OUTPATIENT)
Dept: PHARMACY | Facility: CLINIC | Age: 70
End: 2025-04-30
Payer: COMMERCIAL

## 2025-04-30 NOTE — TELEPHONE ENCOUNTER
Outreach to this patient to schedule annual lung cancer screening exam. Upon further chart review I noted an interim exam CT ANGIO CHEST ABDOMEN PELVIS;  12/6/2024  The results of this imaging will push the annual lung cancer screening due date to 12-7-2025.  This was explained to the patient and she is agreeable.

## 2025-05-02 ENCOUNTER — APPOINTMENT (OUTPATIENT)
Dept: CARDIAC REHAB | Facility: HOSPITAL | Age: 70
End: 2025-05-02
Payer: MEDICARE

## 2025-05-05 ENCOUNTER — PHARMACY VISIT (OUTPATIENT)
Dept: PHARMACY | Facility: CLINIC | Age: 70
End: 2025-05-05
Payer: COMMERCIAL

## 2025-05-05 PROCEDURE — RXMED WILLOW AMBULATORY MEDICATION CHARGE

## 2025-05-07 ENCOUNTER — APPOINTMENT (OUTPATIENT)
Dept: CARDIAC REHAB | Facility: HOSPITAL | Age: 70
End: 2025-05-07
Payer: MEDICARE

## 2025-05-09 ENCOUNTER — APPOINTMENT (OUTPATIENT)
Dept: CARDIAC REHAB | Facility: HOSPITAL | Age: 70
End: 2025-05-09
Payer: MEDICARE

## 2025-05-11 PROCEDURE — RXMED WILLOW AMBULATORY MEDICATION CHARGE

## 2025-05-13 ENCOUNTER — PHARMACY VISIT (OUTPATIENT)
Dept: PHARMACY | Facility: CLINIC | Age: 70
End: 2025-05-13
Payer: COMMERCIAL

## 2025-05-13 ENCOUNTER — OFFICE VISIT (OUTPATIENT)
Dept: CARDIOLOGY | Facility: HOSPITAL | Age: 70
End: 2025-05-13
Payer: MEDICARE

## 2025-05-13 VITALS
OXYGEN SATURATION: 98 % | HEART RATE: 69 BPM | DIASTOLIC BLOOD PRESSURE: 79 MMHG | WEIGHT: 166.6 LBS | SYSTOLIC BLOOD PRESSURE: 121 MMHG | BODY MASS INDEX: 28.44 KG/M2 | HEIGHT: 64 IN

## 2025-05-13 DIAGNOSIS — I25.10 CORONARY ARTERIOSCLEROSIS: Primary | ICD-10-CM

## 2025-05-13 DIAGNOSIS — I10 BENIGN ESSENTIAL HYPERTENSION: ICD-10-CM

## 2025-05-13 DIAGNOSIS — E78.2 MIXED HYPERLIPIDEMIA: ICD-10-CM

## 2025-05-13 DIAGNOSIS — Z95.5 STENTED CORONARY ARTERY: ICD-10-CM

## 2025-05-13 DIAGNOSIS — F17.200 TOBACCO DEPENDENCE SYNDROME: ICD-10-CM

## 2025-05-13 PROCEDURE — 99214 OFFICE O/P EST MOD 30 MIN: CPT | Performed by: INTERNAL MEDICINE

## 2025-05-13 PROCEDURE — 99212 OFFICE O/P EST SF 10 MIN: CPT | Performed by: INTERNAL MEDICINE

## 2025-05-13 PROCEDURE — 1160F RVW MEDS BY RX/DR IN RCRD: CPT | Performed by: INTERNAL MEDICINE

## 2025-05-13 PROCEDURE — 3078F DIAST BP <80 MM HG: CPT | Performed by: INTERNAL MEDICINE

## 2025-05-13 PROCEDURE — 3008F BODY MASS INDEX DOCD: CPT | Performed by: INTERNAL MEDICINE

## 2025-05-13 PROCEDURE — 1159F MED LIST DOCD IN RCRD: CPT | Performed by: INTERNAL MEDICINE

## 2025-05-13 PROCEDURE — G2211 COMPLEX E/M VISIT ADD ON: HCPCS | Performed by: INTERNAL MEDICINE

## 2025-05-13 PROCEDURE — 1125F AMNT PAIN NOTED PAIN PRSNT: CPT | Performed by: INTERNAL MEDICINE

## 2025-05-13 PROCEDURE — 4004F PT TOBACCO SCREEN RCVD TLK: CPT | Performed by: INTERNAL MEDICINE

## 2025-05-13 PROCEDURE — 3074F SYST BP LT 130 MM HG: CPT | Performed by: INTERNAL MEDICINE

## 2025-05-13 RX ORDER — IBUPROFEN 200 MG
1 TABLET ORAL EVERY 24 HOURS
Qty: 28 PATCH | Refills: 1 | Status: SHIPPED | OUTPATIENT
Start: 2025-05-13 | End: 2025-07-12

## 2025-05-13 RX ORDER — MICONAZOLE NITRATE 2 %
2 CREAM (GRAM) TOPICAL AS NEEDED
Qty: 100 EACH | Refills: 2 | Status: SHIPPED | OUTPATIENT
Start: 2025-05-13 | End: 2025-06-12

## 2025-05-13 ASSESSMENT — ENCOUNTER SYMPTOMS
LOSS OF SENSATION IN FEET: 0
OCCASIONAL FEELINGS OF UNSTEADINESS: 0
DEPRESSION: 0

## 2025-05-13 ASSESSMENT — PAIN SCALES - GENERAL: PAINLEVEL_OUTOF10: 2

## 2025-05-13 NOTE — PROGRESS NOTES
Subjective   Dorinda Benson is a 69 y.o. female who presents to the [facility] for evaluation of coronary artery disease after December 2024 acute MI (RCA culprit lesion). Last seen in March 2025.    Since our last visit, Ms. Benson has no active cardiac symptoms of chest pain, PND, orthopnea, OMAR, palpitations, syncope, or claudication. Notes less exertional dyspnea with climbing 1-2 flights of stairs. Did 19/36 cardiac rehab sessions. Stopped for family health stressor events and relapsed on smoking (1/2 pack/day).    She presented with chest pain 12/5/2024 evening that resolved before admission to ER. 12/6 ECG showed nonspecific T wave changes but not STEMI ECG criteria. CCTA showed acutely occluded mid RCA and taken emergently to cath lab with CICU overnight observation. LV EF 70% without LV systolic wall motion abnormality.    Admitted 1/29 - 1/31/2025 for large upper GI bleeding (AVM) with attempted clipping x3. Cardiology consulted and antiplatelet medication adjusted to Plavix 75 mg a day indefinitely.       Past Medical History:  1. Coronary artery disease s/p 12/6/2024 mid RCA PCI for 100% thrombotic occlusion.  2. Dyslipidemia  3. Hypertension  4. Nicotine dependence    Social History:  Active smoker (< 1/2 pack/day now)    Family History:  No premature CAD in 1st degree relatives ( 55 years of age for male relatives,  65 years of age for female relatives)     Review of Systems    A 14 point review of systems was asked. All questions were negative except for pertinent positives listed in the HPI.     Current Outpatient Medications on File Prior to Visit   Medication Sig Dispense Refill    amLODIPine (Norvasc) 5 mg tablet TAKE 1 TABLET BY MOUTH ONCE DAILY 90 tablet 3    atorvastatin (Lipitor) 80 mg tablet Take 1 tablet (80 mg) by mouth once daily at bedtime. 30 tablet 11    clopidogrel (Plavix) 75 mg tablet Take 1 tablet (75 mg) by mouth once daily. 90 tablet 3    diphenhydramine  "HCl (BENADRYL ALLERGY ORAL) Take 1 tablet by mouth as needed at bedtime.      ferrous sulfate 325 (65 Fe) mg EC tablet Take 1 tablet by mouth every other day. Do not crush, chew, or split. 15 tablet 11    FLUoxetine (PROzac) 20 mg capsule Take 1 capsule (20 mg) by mouth once daily. 30 capsule 1    guaiFENesin (Mucinex) 600 mg 12 hr tablet Take 2 tablets (1,200 mg) by mouth 2 times a day. Do not crush, chew, or split. (Patient taking differently: Take 2 tablets (1,200 mg) by mouth if needed. Do not crush, chew, or split.) 120 tablet 2    losartan (Cozaar) 100 mg tablet Take 1 tablet (100 mg) by mouth once daily. 30 tablet 11    multivitamin tablet Take 1 tablet by mouth once daily. 30 tablet 11    pantoprazole (ProtoNix) 40 mg EC tablet Take 1 tablet (40 mg) by mouth once daily. Do not crush, chew, or split. 90 tablet 1    thiamine 100 mg tablet Take 1 tablet (100 mg) by mouth once daily. 30 tablet 11     No current facility-administered medications on file prior to visit.         Objective   Physical Exam  BP Readings from Last 3 Encounters:   05/13/25 121/79   04/16/25 126/72   03/19/25 126/72      Wt Readings from Last 3 Encounters:   05/13/25 75.6 kg (166 lb 9.6 oz)   03/11/25 71.7 kg (158 lb)   02/14/25 72 kg (158 lb 11.2 oz)      BMI: Estimated body mass index is 28.6 kg/m² as calculated from the following:    Height as of this encounter: 1.626 m (5' 4\").    Weight as of this encounter: 75.6 kg (166 lb 9.6 oz).  BSA: Estimated body surface area is 1.85 meters squared as calculated from the following:    Height as of this encounter: 1.626 m (5' 4\").    Weight as of this encounter: 75.6 kg (166 lb 9.6 oz).    General: no acute distress  HEENT: EOMI, no scleral icterus.  Lungs: Clear to auscultation bilaterally without wheezing, rales, or rhonchi.  Cardiovascular: Regular rhythm and rate. Normal S1 and S2. No murmurs, rubs, or gallops are appreciated. JVP normal.  Abdomen: Soft, nontender, nondistended. Bowel " "sounds present.  Extremities: Warm and well perfused with equal 2+ pulses bilaterally.  No edema present.  Neurologic: Alert and oriented x3.      I have personally reviewed the following images and laboratory findings:  Last echocardiogram:  2024 echocardiogram: 70-75%, normal LV size, no LVH (LVMI 76 gm/m2), normal diastology (E/e' 9, E/A 0.74), normal LA size (ANAMARIA 28 ml/m2), negative bubble study for PFO, normal RV/RA, trace AI, trace MR, trace TR, RVSP 31 mm Hg (RAP 3 mm Hg).    Last cath / stress test:  2024 LHC: LMCA: < 30% plaque; LAD: < 30% plaque; LCx: < 30% plaque; RCA: 100% mid acute thrombotic lesion s/p [place] 3 x 22 mm JIM with 0% residual stenosis.    2024 CCTA: Acute mid RCA occlusion; otherwise normal coronary arteries.    2025 ETT (pre cardiac rehab): 6:30 min, 4 METs, no ECG changes.    Most recent EC2025 ECG: Sinus rhythm, 73 bpm, normal ECG. Personally reviewed in office.    Lab Results   Component Value Date    CHOL 147 2025    CHOL 272 (H) 2024    CHOL 226 (H) 2024     Lab Results   Component Value Date    HDL 68 2025    HDL 73.5 2024    HDL 65.4 2024     Lab Results   Component Value Date    LDLCALC 59 2025    LDLCALC 161 (H) 2024    LDLCALC 146 (H) 2023     Lab Results   Component Value Date    TRIG 121 2025    TRIG 190 (H) 2024    TRIG 174 (H) 2023     No components found for: \"CHOLHDL\"      Assessment/Plan   1. CAD:  S/p JIM to mid RCA 2024. Continue Plavix 75 mg a day indefinitely (replaced DAPT [ticagrelor 90 mg twice a day and aspirin 81 mg a day] late 2025 for large acute upper GI bleeding from AVMs).     For dyslipidemia, continue atorvastatin 80 mg a day. Goal LDL < 70    2024 repeat fasting lipid panel at goal with LDL 59.     Blood pressure at goal range 120-130 mm Hg now taking amlodipine 5 mg a day and losartan 100 mg. Continue both medications for " hypertension at current dose.    2. Nicotine dependence:  Smoking cessation counseled for > 3 minutes. Had relapse since last visit. Ordered 14 mg/24 hour nicotine patches (1/2 pack day right now) and breakthrough craving nicotine gum treatment.    Follow up with Dr. Carty in 6 months        SIGNATURE: Zohaib Carty M.D.  [facility]  Senior Attending, Division of Cardiovascular Medicine  Co-Director, [facility]   of Medicine  [facility]  [address] [facility] [address]  [place] [address]  Phone: [phone]  Fax: [phone]  Appointment: [phone]  PATIENT NAME: Dorinda Benson   DATE/TIME: May 13, 2025 3:07 PM MRN: [id number]

## 2025-05-13 NOTE — PATIENT INSTRUCTIONS
You were seen at the [facility] for a check up of your heart.     For your right coronary artery heart stent, continue Plavix 75 mg a day that was started in place of your prior Brilinta 90 mg twice a day and aspirin 81 mg a day because of a large bleeding AVM from your stomach. You cannot miss any doses of Plavix in the next 9 months until the end of December 2025 to prevent a blood clot forming inside your heart stent which would cause a massive heart attack.    Continue atorvastatin 80 mg a day to lower your cholesterol. Your March 2025 fasting cholesterol lab work is at goal with a LDL level 59 (down from 161) while taking your cholesterol medication. You are now at long term LDL goal < 70.    Continue amlodipine 5 mg a day and losartan 100 mg a day for blood pressure.     Check your blood pressure every morning about 30-60 minutes after taking your morning blood pressure medications and keep a log book. Sit for 3-5 minutes in a chair to relax and place your arm on a table or counter to be level with your heart. Goal blood pressure range for you is 120-130 mm Hg.     Eat a heart healthy diet. Limit portions of red meat. Eat fresh fruits and vegetables instead of processed carbohydrates. Olive oil (1 tablespoon a day), avocados, tree nuts as a source of  omega-3 fat. Beans and legumes are good sources of plant based protein and fiber. The Mediterranean diet has been shown in clinical trials to reduce risk of heart disease by following these principles.     I encourage you to pick a quit date for smoking. Stopping smoking/vaping is the biggest change you can do to lower your risk of having a heart attack or developing heart disease. Stopping smoking will also help control your blood pressure and reduce future risk of having a stroke or developing lung disease.    I ordered nicotine patches 14 mg / 24 hours and nicotine gum (2 mg nicotine per piece) for breakthrough cravings while using  the patches.     Use the [place] Quit Now resource page:  https://odh.[place].gov/wps/portal/gov/[facility]/know-our-programs/tobacco-use-prevention-and-cessation/cessation/      Follow up with Dr. Carty in 6 months

## 2025-05-16 ENCOUNTER — APPOINTMENT (OUTPATIENT)
Dept: CARDIAC REHAB | Facility: HOSPITAL | Age: 70
End: 2025-05-16
Payer: MEDICARE

## 2025-05-19 ENCOUNTER — APPOINTMENT (OUTPATIENT)
Dept: CARDIAC REHAB | Facility: HOSPITAL | Age: 70
End: 2025-05-19
Payer: MEDICARE

## 2025-05-21 ENCOUNTER — APPOINTMENT (OUTPATIENT)
Dept: CARDIAC REHAB | Facility: HOSPITAL | Age: 70
End: 2025-05-21
Payer: MEDICARE

## 2025-05-21 ENCOUNTER — APPOINTMENT (OUTPATIENT)
Dept: PRIMARY CARE | Facility: HOSPITAL | Age: 70
End: 2025-05-21
Payer: MEDICARE

## 2025-05-21 NOTE — PROGRESS NOTES
"[facility]    SUBJECTIVE:  CC: ***    HPI: Dorinda Benson is a 69 y.o. female with a PMHx significant for CAD (NSTEMI 12/2024 s/p PCI with JIM to mid RCA, on Plavix monotherapy), HTN, HLD, prediabetes (A1c 5.9), UGIB 01/2024, CHRISTIANO, and substance use presenting for 6 week follow up.       Interval History/History per chart review:  Ms. Benson was last seen in clinic on 01/28/25 for routine follow-up, as well as for increased anxiety. During this appointment she was referrals to access behavior health and tobacco cessation, she was started on fluoxetine 20 mg daily (instructed pt to take 1/2 pill for 5 days and increase to full pill if going well), and CBC/folate/B12 were drawn due to reports of previous melena.    Following this visit, her CBC revealed a value of 6.6 and she was advised to present to the ED for further evaluation. She was admitted from 01/30 to 01/31 during which time her:  - Brilinta was switched to Plavix (600 mg loaded 01/31 then continue 75 mg daily)  - ASA was stopped  - PPI, pantoprazole 40 mg, was started   - EGD on 1/30 showed bleeding AVM which required APC for hemostasis after clips x3 failed to achieve hemostasis     She was seen in the ED 02/06 for \"cough, body aches, chills, malaise, headache of insidious onset for 3 days\". She was  Influenza A positive.    [place] [facility] [place]25:   - Mammogram was ordered for 05/25  - LDCT ordered for 08/25  - Iron studies ordered and started on ferous sulfate 325 every other day  - iron 40, , %sat 12, ferritin 26  - CBC with hbg 8.9     She was seen by primary cardiologist, 03/11/25:  - continue Plavix 75 mg a day indefinitely   - continue atorvastatin 80 mg a day   - repeat lipid panel, Goal LDL < 70   - continue amlodipine 5 mg and losartan 100 mg daily   - goal SBP range 120-130 mm Hg   - RTC 6 months    Per the patient:  ***    Health behaviors:  Diet and exercise: working on a " healthier diet since her heart attack. In cardiac rehab currently ***  Dental exams:  Eye exams:    Health maintenance:  Health Maintenance   Topic Date Due    Diabetes: Urine Protein Screening  Never done    Medicare Annual Wellness Visit (AWV)  Never done    RSV High Risk: (Elderly (60+) or Pregnant Population) (1 - Risk 60-74 years 1-dose series) Never done    COVID-19 Vaccine (1 - 2024-25 season) Never done    Diabetes: Hemoglobin A1C  03/06/2025    Bone Density Scan  04/27/2025    Mammogram  05/29/2025    Influenza Vaccine (Season Ended) 09/01/2025    Lipid Panel  03/12/2026    Diabetes: Retinopathy Screening  05/20/2026    DTaP/Tdap/Td Vaccines (2 - Td or Tdap) 01/24/2033    Colorectal Cancer Screening  02/22/2033    Pneumococcal Vaccine  Completed    Zoster Vaccines  Completed    Hepatitis C Screening  Completed    HIB Vaccines  Aged Out    Hepatitis B Vaccines  Aged Out    IPV Vaccines  Aged Out    Hepatitis A Vaccines  Aged Out    Meningococcal Vaccine  Aged Out    Rotavirus Vaccines  Aged Out    HPV Vaccines  Aged Out    Irritable Bowel Syndrome  Discontinued       Medications:  Current Medications[1]    Require med refills? ***  [facility]       Past medical history:  Medical History[2]    LMP:  Sexual Hx:    Allergies:  RX Allergies[3]    Surgical history:  Surgical History[4]    Family history:  Family History[5]    Social history:   reports that she has been smoking cigarettes. She has never used smokeless tobacco. She reports that she does not currently use alcohol. She reports current drug use. Drug: Marijuana.    Social History     Social History Narrative    Not on file       1. Living situation: Lives independently in her apartment  2. Work: None  3. Alcohol: ***   -  was down to 1 drink a day, recently increased intake over past couple weeks   4. Tobacco:   - 4-5 cigarettes a day   5. Other drugs:  Denies   6. Diet/exercise:     Safety:  - Housing insecurity  - Food insecurity  -  "Weapons in the home:     Review of systems:  As per HPI    OBJECTIVE:  Vitals:  There were no vitals filed for this visit.    Physical exam: ***  Constitutional: Well-developed female in no acute distress.  HEENT: NC/AT, sclera anicteric  Respiratory: CTAB. No wheezes, rales, or rhonchi. Normal respiratory effort.  Cardiovascular: RRR. No murmurs, gallops, or rubs.  Abdominal: Soft, nondistended, nontender to palpation. Bowel sounds present. No hepatosplenomegaly or masses.   Neuro: AAOx3. CN II-XII grossly intact. Tongue midline, no facial droop  MSK: No LE edema bilaterally. Moving extremities well  Skin: Warm, dry. No rashes or wounds.  Psych: Appropriate mood and affect.    Labs:  No results found for this or any previous visit (from the past 24 hours).    Imaging:  Imaging  No results found.    Cardiology, Vascular, and Other Imaging  No other imaging results found for the past 7 days      ASSESSMENT and PLAN:  Dorinda Benson is a 69 y.o. female with a PMHx significant for CAD (NSTEMI 12/2024 s/p PCI with JIM to mid RCA, on Plavix monotherapy), HTN, HLD, prediabetes (A1c 5.8), UGIB 01/2024, CHRISTIANO, and substance use, who presented today for follow-up.     Updates:  -     #Hx UGIB iso DAPT  #CHRISTIANO  ::Pt endorsing \"dark stools\" started 12/28 - 01/27 following initiation of DAPT  ::Admitted 1/29/25 for UGIB s/p clip x3 and APC to AVM  ::switched from DAPT to plavix monotherapy per cards 01/30/25  ::most recent hgb 9.6 (2/6) ->8.7 (2/10)  ::iron studies from 02/14: iron 40, , %sat 12, ferritin 26   - continue with pantoprazole 40 mg daily  - continue Ferrous sulfate 325 every other day ***     #CAD   #HLD  #NSTEMI s/p PCI to RCA 12/2024  ::Most recent lipid panel 03/2025 showing:              -Total 147, HDL 63, LDL 59, Tri 121  ::Follow with [place], Dr. Carty, last visits 05/13/25  ::switched from DAPT to plavix monotherapy per cards 1/30/25  - Continue Atorvastatin 80 mg  - Continue plavix monotherapy   - " Continue cardiac rehab (12/19 to current)  - Follow up with cardiology 11/18/2025  - cw plavix monotherapy    #HTN  -BP in office today 117/75 ***  - Continue amlodipine 5 mg and losartan 100 mg     #Alcohol use  #Tobacco use  ::Per patient she is down to 1-2 cigarettes and 1 beer per day ***  -Smoking cessation counseling and encouragement of decreased alcohol use provided in office today  -Referral to Tobacco cessation counseling, ordered  -Thiamine supplementation 100 mg daily, ordered     #Anxiety  #Insomnia  ::Perviously, pt endorsing increased anxiety ***  ::MIGEL-7 of 16 on 1/28/25 visit  - cw fluoxetine 20 mg daily ***  - Referral to [facility], ordered  - Spoke about nightly melatonin use              - Pt will get OTC and trial at home        #Prediabetes  - Hga1c 5.8 12/2024 ***  - Continue lifestyle changes      # Health maintenance  - Last HbA1c: 5.8 %, 12/06/24 ***  - ASCVD: ***, on HIST  - STOP-BANG: not addressed  - Infectious:  - Hep C negative 2023   - HIV negative 2024   - Vaccinations:  - Tdap 5/29/2024   - Flu N/a  - VZV Completed   - PNA PCV20 in 2023   - Colonoscopy: last done in 2/22/2023, 2-4 polyps resected, recommended repeat colonoscopy in 3 - 5 years for surveillance  (Due 2026 - 2028)   - Pap testing: 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 1 year follow up pap w/HPV recommended - OVERDUE 03/2024 - patient states she was told this was not needed, mammogram last mammogram 5/29/2024, negative for malignancy (Due 05/2025 - ordered)   - Low-dose CT chest: Low dose CT done in August 2024: stable 3mm pulmonary nodules in the left lung, likely benign (Due 08/2025 - ordered)   - Bone density: last done 4/27/2023   - AAA screening: n/a    Plan:  - Mammogram due this month, already ordered  - LDCT due in August 2025, already ordered  - A1c **  - Repeat lipid panel in 1 year ***, March 2026    Follow-up in ***.    Patient and plan discussed with attending physician {KAREY FLORESC  attendings:[id number]}.    Veronica Bolton MD  Internal Medicine, PGY-1   [facility]        [1]   Current Outpatient Medications:     amLODIPine (Norvasc) 5 mg tablet, TAKE 1 TABLET BY MOUTH ONCE DAILY, Disp: 90 tablet, Rfl: 3    atorvastatin (Lipitor) 80 mg tablet, Take 1 tablet (80 mg) by mouth once daily at bedtime., Disp: 30 tablet, Rfl: 11    clopidogrel (Plavix) 75 mg tablet, Take 1 tablet (75 mg) by mouth once daily., Disp: 90 tablet, Rfl: 3    diphenhydramine HCl (BENADRYL ALLERGY ORAL), Take 1 tablet by mouth as needed at bedtime., Disp: , Rfl:     ferrous sulfate 325 (65 Fe) mg EC tablet, Take 1 tablet by mouth every other day. Do not crush, chew, or split., Disp: 15 tablet, Rfl: 11    FLUoxetine (PROzac) 20 mg capsule, Take 1 capsule (20 mg) by mouth once daily., Disp: 30 capsule, Rfl: 1    guaiFENesin (Mucinex) 600 mg 12 hr tablet, Take 2 tablets (1,200 mg) by mouth 2 times a day. Do not crush, chew, or split. (Patient taking differently: Take 2 tablets (1,200 mg) by mouth if needed. Do not crush, chew, or split.), Disp: 120 tablet, Rfl: 2    losartan (Cozaar) 100 mg tablet, Take 1 tablet (100 mg) by mouth once daily., Disp: 30 tablet, Rfl: 11    multivitamin tablet, Take 1 tablet by mouth once daily., Disp: 30 tablet, Rfl: 11    nicotine (Nicoderm CQ) 14 mg/24 hr patch, Place 1 patch over 24 hours on the skin once every 24 hours., Disp: 28 patch, Rfl: 1    nicotine polacrilex (Nicorette) 2 mg gum, Chew 1 each (2 mg) if needed for smoking cessation., Disp: 100 each, Rfl: 2    pantoprazole (ProtoNix) 40 mg EC tablet, Take 1 tablet (40 mg) by mouth once daily. Do not crush, chew, or split., Disp: 90 tablet, Rfl: 1    thiamine 100 mg tablet, Take 1 tablet (100 mg) by mouth once daily., Disp: 30 tablet, Rfl: 11  [2]   Past Medical History:  Diagnosis Date    Asthma     COPD (chronic obstructive pulmonary disease) (Multi)     HLD (hyperlipidemia)     Hypertension    [3]    Allergies  Allergen Reactions    Aspirin Other     Per provider, patient gets upset stomach when taking aspirin    Latex Hives and Itching   [4]   Past Surgical History:  Procedure Laterality Date    CARDIAC CATHETERIZATION N/A 12/6/2024    Procedure: Left Heart Cath with Coronary Angiography and LV;  Surgeon: Carli Calvert MD;  Location: [place] [address] Cardiac Cath Lab;  Service: Cardiovascular;  Laterality: N/A;    HYSTERECTOMY     [5]   Family History  Problem Relation Name Age of Onset    Dementia Mother      Alcohol abuse Brother      Other (bowel obstruction) Brother      Alcohol abuse Other

## 2025-05-21 NOTE — PATIENT INSTRUCTIONS
As discussed today, our plan is:     Labs - we collected labs today and will call you with any abnormal results. If you have any questions or concerns prior to us calling feel free to call our office to have your questions addressed.   Medication changes: ***  Consultations - you were referred to see the following specialists: *** You should receive a call from central scheduling in the next few days if you do not receive a call within 3-5 business days please call [phone] to schedule your appointment.   4.   If you smoke or use other tobacco products, take steps to quit. Call [phone] for more information or to set up an appointment with  [facility]. The [place] Tobacco Quit Line is a free resource for people who don’t have insurance, receive Medicaid, pregnant women, or members of the [place] [facility]. Call 6-790-QUIT-NOW or 1-967.417.6319.    Please come back to see us in: ***   ------  If you have any problems or questions, please contact the clinic at [phone] to leave a message. Our fax number is [phone]. If your issue cannot wait until the next business day, please go to urgent care or the emergency department.     I also strongly urge all of my patients to register for [facility] by going to: [email or web address]  (The  staff can also send you a text/email link to register when you check out).    No shows: It is understandable if you are unable to make it to a visit, but please cancel your appointment instead of not showing up. This helps to give other patients access to primary care and keeps wait times low.        [facility]   [phone]

## 2025-05-22 ENCOUNTER — TELEPHONE (OUTPATIENT)
Dept: CARDIAC REHAB | Facility: HOSPITAL | Age: 70
End: 2025-05-22
Payer: MEDICARE

## 2025-05-22 ENCOUNTER — DOCUMENTATION (OUTPATIENT)
Dept: CARDIAC REHAB | Facility: HOSPITAL | Age: 70
End: 2025-05-22
Payer: MEDICARE

## 2025-05-22 DIAGNOSIS — I21.4 NSTEMI (NON-ST ELEVATED MYOCARDIAL INFARCTION) (MULTI): Primary | ICD-10-CM

## 2025-05-22 NOTE — PROGRESS NOTES
Cardiac Rehabilitation Discharge Summary    Name: Dorinda Benson   Medical Record Number: [id number]  YOB: 1955   Age: 69 y.o.  Today’s Date: 5/22/2025   Primary Care Physician: Veronica Bolton MD   Referring Physician: Harris De La Rosa MD / Zohaib Carty MD  Program Location: [address]  Primary Diagnosis: NSTEMI (non-ST elevated myocardial infarction) (Multi) [I21.4]   Onset/Date of Diagnosis: 12/06/24   Session #: 19 / 36  AACVPR Risk Stratification: High   Falls Risk: Low    Psychosocial Discharge:   Stress Assessment  Pre PHQ-9: 11  No data recorded   Sent PH-Q 9 to MD if score > 20: No; score < 20    Quality of Life Survey: SF-36   SF-36 Pre Post   Physical Component Score completed TBD   Mental Component Score completed TBD     Pt reported/currently experiencing stress: Yes; Stress; Severity: moderate  Patient uses stress management skills: No   History of: no history of anxiety or depression  Currently seeing a mental health provider: No  Social Support: Yes, Whom:family    Learning Assessment  Learning assessment/barriers: None  Preferred learning method: Auditory, Visual, Reading handout, and Writing handout  Barriers: None  Comments:    Stages of Change: Action    Psychosocial Plan  Goal Status: In progress  Psychosocial Goals: Demonstrating proper techniques for stress management and Maintain or lower PH-Q 9 score by discharge  Psychosocial Interventions/Education:   2/17/25 No change in psychosocial status as reported by patient.  3/12/2025 Effects of stress on the body, risks of unmanaged stress and techniques to minimize anxieties were all discussed in today's session. Handout was given for reference with resources to  Behavioral Health Services should patient need further assistance with stress management. Patient encouraged to notify staff if stressors increase and additional help is required.    3/19/25 Patient reports decrease in stress and depression  symptoms recently.   4/17/25 Stressors with work reported but patient denies change in her stress or depression symptoms. She states that exercise has been helping.   5/22/25 Unable to assess at this time d/t nature of discharge.     Nutrition Discharge:  Diet Habit Survey: Picture Your Plate  Pre: 37  Post: To be done at discharge.    Survey results reviewed with Dietician: Not at this time    Lipids Assessment  Current Dietary Guidelines: Low fat  Barriers to dietary change: no  Hyperlipidemia: Yes   Lab Results   Component Value Date    CHOL 147 03/12/2025    HDL 68 03/12/2025    LDLCALC 59 03/12/2025    TRIG 121 03/12/2025     Diabetes Assessment  History of Diabetes: No  Lab Results   Component Value Date    HGBA1C 5.8 (H) 12/06/2024      Weight Management      /Nutrition Plan  Goal Status: In progress  Nutrition Goals: Lipid Goal: HDL>45, LDL <70, Total <180, Trigs <150, Improve Diet Habit Survey score by 5-10 points by discharge, Adapt a Mediterranean focused diet prior to discharge, Learn how to read and interpret nutrition labels prior to discharge, and Lose 1lb/week while enrolled in program  Nutrition Interventions/Education:   1/15/2025 Patient attended Heart Healthy Diet lecture with program RDN during today's exercise session. Heart Healthy diet tips sheet was given for further review at home and patient was encouraged to come back with any follow up questions.   2/24/2025  Label reading education was done with program's RDN during today's visit. Label Reading Highlights handout was given with information discussed during appointment and to assist in review of label contents at home. Signature Seble Moe RDN, LD  4/16/2025  Label reading education was done with program's RDN during today's visit. Label Reading Highlights handout was given with information discussed during appointment and to assist in review of label contents at home.  Signature Seble Moe RDN, LD  5/22/25 Unable to obtain  post PYP score d/t nature of discharge. Patient was working on making heart healthy diet changes and reported weekly updates to rehab staff.     Exercise Discharge:  Home Exercise  Current Home Exercise?: No  Mode: NA  Frequency: NA  Duration: NA   Home Exercise Prescription given: To be given prior to discharge from program.    Exercise Prescription  Exercise Prescription based on: Pre-rehab Stress Test; completed 1/8/24  Resistance Training: No   Frequency:  3 days/week  Mode: Treadmill, NuStep, and Recumbent Cycle  Duration: 36 total aerobic minutes  Intensity: RPE 12-16  Target HR:   bpm; THR calculated via exercise Stress Test by program EP  MET Level: 4.2  Patient wears supplemental O2: No  Modality Workload METs Duration (minutes)   Pre-Exercise      Treadmill 3.0 mph @ 2 % 4.2 12 :00   NuStep 4000 3 load @ 60 winters 3.9 12 :00   Recumbent Bike 5 load @ 55 rpms 3.1 12 :00   Post-Exercise         Exercise Plan  Goal Status: In progress  Exercise Goals: Increase exercise MET level by 5-10% each week, Increase total exercise duration to 30-45 minutes, Initiate strength training 2-3 days a week, Initiate flexibility training 2-3 days a week, and Establish a home exercise program before discharge  Exercise Interventions/Education:   1/13/2025 Initial exercise session completed without complications.  Reviewed ExRx with patient. Based on HR/BP/ RPD/ RPE, intensity/workload was decreased on Treadmill and Recumbent Bike. Patient rating RPE/RPD appropriately.   1/17/25 Duration increased by 1 minute on each modality. Total exercise duration now 27 minutes.   3/20/25 Pt has increase both duration and intensity on all modalities. Pt rating RPE/RPD appropriately.   4/17/25 Updated ExRx based on patient's RPD/RPE. Patient rating appropriately.    5/22/25 Pt increased MET level from 3.5 to 4.2 (+20%) and exercise duration from 25 minutes to 36 minutes (+44%). Discussions had been started regarding patient's discharge  exercise plan.    Other Core Components/Risk Factor Discharge:  Medication Adherence  Medication compliance: Yes  Uses pill box/organizer: Yes   Carries medication list: Yes ; MyChart  Is patient prescribed Nitroglycerin? No  Current Medications:   Medication Documentation Review Audit       Reviewed by Zohaib Carty MD (Physician) on 05/13/25 at 1508      Medication Order Taking? Sig Documenting Provider Last Dose Status   amLODIPine (Norvasc) 5 mg tablet [id number] Yes TAKE 1 TABLET BY MOUTH ONCE DAILY Zohaib Carty MD 1/29/2025 Active   atorvastatin (Lipitor) 80 mg tablet [id number] Yes Take 1 tablet (80 mg) by mouth once daily at bedtime. Zohaib Carty MD 1/28/2025 Active   clopidogrel (Plavix) 75 mg tablet [id number] Yes Take 1 tablet (75 mg) by mouth once daily. Zohaib Carty MD  Active   diphenhydramine HCl (BENADRYL ALLERGY ORAL) [id number] Yes Take 1 tablet by mouth as needed at bedtime. Lacy Guerra MD 1/28/2025 Active   ferrous sulfate 325 (65 Fe) mg EC tablet [id number] Yes Take 1 tablet by mouth every other day. Do not crush, chew, or split. Marco Antonio Downing MD  Active   FLUoxetine (PROzac) 20 mg capsule [id number] Yes Take 1 capsule (20 mg) by mouth once daily. Nayeli Garcia MD  Active   guaiFENesin (Mucinex) 600 mg 12 hr tablet [id number] Yes Take 2 tablets (1,200 mg) by mouth 2 times a day. Do not crush, chew, or split.   Patient taking differently: Take 2 tablets (1,200 mg) by mouth if needed. Do not crush, chew, or split.    Marco Antonio Downing MD  Active   losartan (Cozaar) 100 mg tablet [id number] Yes Take 1 tablet (100 mg) by mouth once daily. Zohaib Carty MD 1/29/2025 Active   multivitamin tablet [id number] Yes Take 1 tablet by mouth once daily. Lawrence Lima MD 1/29/2025 Active   pantoprazole (ProtoNix) 40 mg EC tablet [id number] Yes Take 1 tablet (40 mg) by mouth once daily. Do not crush, chew, or split. Stan Aquino MD  Active   thiamine 100 mg tablet [id number] Yes Take 1  "tablet (100 mg) by mouth once daily. Veronica Bolton MD 1/29/2025 Active                Blood Pressure Management  History of Hypertension: Yes   Medication Changes: No   Resting BP: There were no vitals taken for this visit.     Heart Failure Management  Hx of Heart Failure: No    Smoking/Tobacco Assessment  Social History     Tobacco Use    Smoking status: Every Day     Current packs/day: 0.50     Types: Cigarettes    Smokeless tobacco: Never    Tobacco comments:     Down to 1 cigarette after eating    Substance Use Topics    Alcohol use: Not Currently     Comment: 4 pack Stripe with a shot of vodka for each beer daily    Drug use: Yes     Types: Marijuana      Other Core Component Plan  Goal Status: In progress  Other Core Component Goals: Medication compliance, Verbalize medication usage and drug actions by discharge, Begin tobacco cessation program, Set tobacco cessation quit date while enrolled, and Achieve resting BP of < 130/80 by discharge  Other Core Component Interventions/Education:   1/29/25 Dr. Ying reached out to [facility] to relay that Dorinda would not be able to attend Cardiac Rehab today because patient is being sent to the ED for a hemoglobin of 6.6. Spoke to patient stating rehab staff is aware of her absence and will follow course of care.   2/17/2025 Tobacco cessation was discussed with patient. Pt with TCC referral already placed from last PCP [facility]. In process of setting up appointment.   3/28/2025 Tobacco cessation was discussed with patient. Pt plans to attempt to wean herself off of cigarettes and refused TCC at this time. She plans to work with rehab staff through cessation process.   5/22/25 Pt relapsed on smoking but maintains adherence to her prescribed medications. BP's were WNL while enrolled in CR.     Individual Patient Goals:  \"Better health and feel good about the decisions that I'm making.\"  Establish heart healthy diet by discharge from program.  Establish home " exercise routine by discharge from program.   Goal Status: In progress    Staff Comments:  Dorinda has completed 19 sessions of Cardiac Rehab and has not completed any new sessions at this time. She is being discharged from the program for no showing >3 visits as stated in program Compliance Contract. Dorinda had recently been absent from workouts due to illness and work conflicts which had increased her stress. However, she stated in her last visit that she was feeling better and was able to manage her stressors. She made good progress while enrolled, maintaining max MET level of 4.2 and duration of 36 minutes. No cardiac complaints have been voiced and no ectopy has been noted on ECG. BP have been WNL.     Patient has until 12/06/2025 to complete her remaining Cardiac Rehab visits.      Rehab Staff Signature: Inna Edouard RN

## 2025-05-22 NOTE — TELEPHONE ENCOUNTER
PATIENT: Dorinda Benson  DATE: 5/22/2025    Pt has no called/no showed >3 sessions of Cardiac Rehab. Pt was notified that she is being discharged from the program at this time d/t missed visits. Pt was informed if she wishes to complete the remaining sessions, she can do so prior to 12/06/2025 based on her diagnosis of NSTEMI (non-ST elevated myocardial infarction) (Multi) [I21.4]. Pt instructed to reach out to rehab staff when she is ready to re-enroll and understanding was verbalized.     Inna Edouard RN

## 2025-05-23 ENCOUNTER — APPOINTMENT (OUTPATIENT)
Dept: CARDIAC REHAB | Facility: HOSPITAL | Age: 70
End: 2025-05-23
Payer: MEDICARE

## 2025-05-23 PROCEDURE — RXMED WILLOW AMBULATORY MEDICATION CHARGE

## 2025-05-28 ENCOUNTER — APPOINTMENT (OUTPATIENT)
Dept: CARDIAC REHAB | Facility: HOSPITAL | Age: 70
End: 2025-05-28
Payer: MEDICARE

## 2025-05-30 ENCOUNTER — PHARMACY VISIT (OUTPATIENT)
Dept: PHARMACY | Facility: CLINIC | Age: 70
End: 2025-05-30
Payer: COMMERCIAL

## 2025-05-30 ENCOUNTER — APPOINTMENT (OUTPATIENT)
Dept: CARDIAC REHAB | Facility: HOSPITAL | Age: 70
End: 2025-05-30
Payer: MEDICARE

## 2025-05-31 DIAGNOSIS — F41.9 ANXIETY: ICD-10-CM

## 2025-06-02 ENCOUNTER — PHARMACY VISIT (OUTPATIENT)
Dept: PHARMACY | Facility: CLINIC | Age: 70
End: 2025-06-02
Payer: COMMERCIAL

## 2025-06-02 ENCOUNTER — APPOINTMENT (OUTPATIENT)
Dept: CARDIAC REHAB | Facility: HOSPITAL | Age: 70
End: 2025-06-02
Payer: MEDICARE

## 2025-06-02 PROCEDURE — RXMED WILLOW AMBULATORY MEDICATION CHARGE

## 2025-06-02 RX ORDER — FLUOXETINE 20 MG/1
20 CAPSULE ORAL DAILY
Qty: 30 CAPSULE | Refills: 1 | Status: SHIPPED | OUTPATIENT
Start: 2025-06-02 | End: 2025-08-01

## 2025-06-04 ENCOUNTER — APPOINTMENT (OUTPATIENT)
Dept: OPHTHALMOLOGY | Facility: CLINIC | Age: 70
End: 2025-06-04
Payer: COMMERCIAL

## 2025-06-04 DIAGNOSIS — H52.4 HYPEROPIA OF BOTH EYES WITH ASTIGMATISM AND PRESBYOPIA: Primary | ICD-10-CM

## 2025-06-04 DIAGNOSIS — H52.03 HYPEROPIA OF BOTH EYES WITH ASTIGMATISM AND PRESBYOPIA: Primary | ICD-10-CM

## 2025-06-04 DIAGNOSIS — H52.203 HYPEROPIA OF BOTH EYES WITH ASTIGMATISM AND PRESBYOPIA: Primary | ICD-10-CM

## 2025-06-04 PROCEDURE — 92014 COMPRE OPH EXAM EST PT 1/>: CPT | Performed by: OPTOMETRIST

## 2025-06-04 PROCEDURE — 92015 DETERMINE REFRACTIVE STATE: CPT | Performed by: OPTOMETRIST

## 2025-06-04 ASSESSMENT — REFRACTION_MANIFEST
OS_ADD: +2.50
OS_SPHERE: +1.75
OS_CYLINDER: -0.50
OD_SPHERE: +1.00
OD_ADD: +2.50
OD_SPHERE: +0.75
OD_CYLINDER: SPHERE
OS_CYLINDER: -0.50
OS_AXIS: 020
OS_SPHERE: +1.25
OD_CYLINDER: -0.50
OS_AXIS: 180
METHOD_AUTOREFRACTION: 1
OD_AXIS: 135

## 2025-06-04 ASSESSMENT — VISUAL ACUITY
OS_SC: 20/25
OD_CC: 20/80
OS_CC: 20/80
OD_SC: 20/30
METHOD: SNELLEN - LINEAR

## 2025-06-04 ASSESSMENT — ENCOUNTER SYMPTOMS
EYES NEGATIVE: 0
PSYCHIATRIC NEGATIVE: 0
RESPIRATORY NEGATIVE: 0
ALLERGIC/IMMUNOLOGIC NEGATIVE: 1
CONSTITUTIONAL NEGATIVE: 0
MUSCULOSKELETAL NEGATIVE: 0
CARDIOVASCULAR NEGATIVE: 1
ENDOCRINE NEGATIVE: 0
GASTROINTESTINAL NEGATIVE: 0
NEUROLOGICAL NEGATIVE: 0
HEMATOLOGIC/LYMPHATIC NEGATIVE: 0

## 2025-06-04 ASSESSMENT — REFRACTION_WEARINGRX
OD_ADD: +2.50
OD_AXIS: 135
OD_CYLINDER: -0.50
OS_AXIS: 020
OD_SPHERE: +0.75
OS_SPHERE: +1.25
OS_ADD: +2.50
OS_CYLINDER: -0.50

## 2025-06-04 ASSESSMENT — EXTERNAL EXAM - LEFT EYE: OS_EXAM: NORMAL

## 2025-06-04 ASSESSMENT — CONF VISUAL FIELD
OS_SUPERIOR_TEMPORAL_RESTRICTION: 0
OS_INFERIOR_NASAL_RESTRICTION: 0
OS_NORMAL: 1
METHOD: COUNTING FINGERS
OD_SUPERIOR_NASAL_RESTRICTION: 0
OS_INFERIOR_TEMPORAL_RESTRICTION: 0
OD_INFERIOR_NASAL_RESTRICTION: 0
OD_NORMAL: 1
OD_SUPERIOR_TEMPORAL_RESTRICTION: 0
OS_SUPERIOR_NASAL_RESTRICTION: 0
OD_INFERIOR_TEMPORAL_RESTRICTION: 0

## 2025-06-04 ASSESSMENT — TONOMETRY
OD_IOP_MMHG: 18
OS_IOP_MMHG: 18
IOP_METHOD: GOLDMANN APPLANATION

## 2025-06-04 ASSESSMENT — CUP TO DISC RATIO
OS_RATIO: .3
OD_RATIO: .3

## 2025-06-04 ASSESSMENT — SLIT LAMP EXAM - LIDS
COMMENTS: GOOD POSITION
COMMENTS: GOOD POSITION

## 2025-06-04 ASSESSMENT — EXTERNAL EXAM - RIGHT EYE: OD_EXAM: NORMAL

## 2025-06-04 NOTE — PROGRESS NOTES
A spectacle prescription was dispensed to be used as needed. Seeing occasional floater OS.     PVD OU.     Past Optical coherence tomography of the macula revealed:    OD: Normal foveal contour, photoreceptor, retinal pigment epithelium, IS/OS junction, central field 253 micron.  Vitreous hyaloid base not visualized.  Findings are normal.   OS:  Normal foveal contour, photoreceptor, retinal pigment epithelium, IS/OS junction, central field 253 micron.  Vitreous hyaloid base not visualized.  Findings are normal.     Cataract are present OU and VA corrects to 20/20 OD and OS.    Allergic conjunctivitis. Using Claritin. Start Pataday OTC.      The patient was asked to return to our clinic in one year or sooner if ocular or vision changes occur

## 2025-06-06 ENCOUNTER — APPOINTMENT (OUTPATIENT)
Dept: CARDIAC REHAB | Facility: HOSPITAL | Age: 70
End: 2025-06-06
Payer: MEDICARE

## 2025-06-06 ENCOUNTER — PHARMACY VISIT (OUTPATIENT)
Dept: PHARMACY | Facility: CLINIC | Age: 70
End: 2025-06-06
Payer: COMMERCIAL

## 2025-06-06 PROCEDURE — RXMED WILLOW AMBULATORY MEDICATION CHARGE

## 2025-06-09 ENCOUNTER — APPOINTMENT (OUTPATIENT)
Dept: CARDIAC REHAB | Facility: HOSPITAL | Age: 70
End: 2025-06-09
Payer: MEDICARE

## 2025-06-11 ENCOUNTER — OFFICE VISIT (OUTPATIENT)
Dept: PRIMARY CARE | Facility: HOSPITAL | Age: 70
End: 2025-06-11
Payer: MEDICARE

## 2025-06-11 VITALS
HEART RATE: 72 BPM | HEIGHT: 64 IN | TEMPERATURE: 96.1 F | WEIGHT: 166.5 LBS | SYSTOLIC BLOOD PRESSURE: 130 MMHG | OXYGEN SATURATION: 98 % | DIASTOLIC BLOOD PRESSURE: 75 MMHG | BODY MASS INDEX: 28.42 KG/M2

## 2025-06-11 DIAGNOSIS — I10 PRIMARY HYPERTENSION: Primary | ICD-10-CM

## 2025-06-11 DIAGNOSIS — K92.2 GASTROINTESTINAL HEMORRHAGE, UNSPECIFIED GASTROINTESTINAL HEMORRHAGE TYPE: ICD-10-CM

## 2025-06-11 ASSESSMENT — ENCOUNTER SYMPTOMS
LOSS OF SENSATION IN FEET: 0
DEPRESSION: 0
OCCASIONAL FEELINGS OF UNSTEADINESS: 1

## 2025-06-11 NOTE — PROGRESS NOTES
"Chief complaint: FUV    HPI:  Dorinda Benson is a 69 y.o. female with a PMHx significant for CAD (NSTEMI 12/2024 s/p PCI with JIM to mid RCA, now on plavix monotherapy), hx of GIB, HTN, HLD, prediabetes (A1c 5.9), and substance use, who presented today for follow-up.    She was last seen in [facility] [place] by myself 2/25 for FUV. Repeat CBC/iron studies ordered for her blood loss anemia 2/2 GIB and she was started on iron supplementation. She was also ordered screening mammogram and LDCT.    She also saw cardiology where her lipid panel was repeated showing LDL 59 at goal and she was continued on her HTN meds and counseled on smoking cessation.     Today, patient states she feels well. She does say she still smokes when she is stressed. She recently had a cousin who was diagnosed with stage 4 stomach cancer so she has increased her smoking to about 5 cigarettes per day up from 1-2 and she states she is having about 3 beers per day up from about one last time. Otherwise well.    She denies any CP/SOB. No BRBPR or melena notice. She denies any symptoms of anemia at this time.       Medications:  Current Medications[1]    Allergies:  RX Allergies[2]    Vitals:  /75 (BP Location: Right arm, Patient Position: Sitting, BP Cuff Size: Adult)   Pulse 72   Temp 35.6 °C (96.1 °F) (Temporal)   Ht 1.626 m (5' 4\")   Wt 75.5 kg (166 lb 8 oz)   SpO2 98%   BMI 28.58 kg/m²         Physical exam:  Constitutional: Well-developed female in no acute distress.  HEENT: Normocephalic, atraumatic. PERRL. EOMI. No cervical lymphadenopathy.  Respiratory: CTA bilaterally. No wheezes, rales, or rhonchi. Normal respiratory effort.  Cardiovascular: RRR. No murmurs, gallops, or rubs. No JVD. Radial pulses 2+.  Abdominal: Soft, nondistended, nontender to palpation. Bowel sounds present. No hepatosplenomegaly or masses. No CVA tenderness.  Neuro: CN II-XII intact. UE and LE strength 5/5 bilaterally and sensation intact. Normal FTN testing.  MSK: No LE " "edema bilaterally.  Skin: Warm, dry. No rashes or wounds.  Psych: Appropriate mood and affect.    Labs:  No results found for this or any previous visit (from the past 24 hours).    Imaging:  Imaging  No results found.    Cardiology, Vascular, and Other Imaging  No other imaging results found for the past 7 days      Assessment and plan:  Dorinda Benson is a 69 y.o. female with a PMHx significant for CAD (NSTEMI 12/2024 s/p PCI with JIM to mid RCA, now on plavix monotherapy), hx of GIB, HTN, HLD, prediabetes (A1c 5.9), and substance use, who presented today for follow-up.     Updates:  -will recheck CBC and iron studies, can stop iron pill if normal  -UACR ordered for CKD screening in HTN  -Patient agreeable to cutting down on tobacco and alcohol, does not want assistance and states she can do it on her own  -Patient agreeable to getting mammo and LDCT in August          #hx UGIB iso DAPT  #CHRISTIANO  ::Pt endorsing \"dark stools\" started 12/28 - 01/27 following initiation of DAPT  ::Admitted 1/29/25 for UGIB s/p clip x3 and APC to AVM  ::switched from DAPT to plavix monotherapy per cards 1/30/25  ::most recent hgb 9.6 (2/6) ->8.7 (2/10)  -cw PPI 40mg daily  -antiplatelet therapy as below  -fu repeat CBC and iron studies today  -cw Ferrous sulfate 325 every other day, can stop if iron studies normal     #CAD   #HLD  #NSTEMI s/p PCI to RCA 12/2024  ::Most recent lipid panel 12/2024 showing:              -Total 272, HDL 73, , Tri 190  ::Follow with Cards, Dr. Carty, last visits 01/07/25  ::switched from DAPT to plavix monotherapy per cards 1/30/25  ::Last LDL 59  -Continue Atorvastatin 80 mg  -Follow up with cardiology 11/25  -cw plavix monotherapy     #Alcohol use  #Tobacco use  -Smoking cessation counseling and encouragement of decreased alcohol use provided in office today       #Anxiety  #Insomnia  ::Per HPI, pt endorsing increased anxiety over the past month, is improved today  ::MIGEL-7 1/28/25  -cw fluoxetine 20 " mg daily        #HTN  -BP in office today 130/75  -Continue amlodipine 5 mg and losartan 100 mg  -fu UACR today          #Prediabetes  -Hga1c 5.8 12/2024  -Continue lifestyle changes  -repeat A1c in 1y     #Health maintenance  - Last HbA1c: 5.8 12/2024  - ASCVD: 25.9, on high-dose statin therapy  - STOP-BANG: not addressed today  - Infectious:  - Hep C negative 2023   - HIV negative 2024  - Vaccinations:  - Tdap: 5/29/2024   - Flu not addressed today  - VZV: Completed   - PNA: PCV20 in 2023   - Colonoscopy: last done in 2/22/2023, 2-4 polyps resected, recommended repeat colonoscopy in 3 - 5 years for surveillance  (Due 2026 - 2028)  - Pap testing  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 1 year follow up pap w/HPV recommended - OVERDUE 03/2024 - patient states she was told this was not needed, mammogram last mammogram 5/29/2024, negative for malignancy (Due 05/2025 -patient states she will get this in August)  - Low-dose CT chest: Low dose CT done in August 2024: stable 3mm pulmonary nodules in the left lung, likely benign (Due 08/2025--patient states she will get this in August))  - Bone density: wnl 4/27/2023   - AAA screening: n/a    Follow-up in 6 months.    Patient and plan discussed with attending physician, Dr. Bishop.    Marco Antonio Downing MD  PGY-2 Internal Medicine  [facility]         [1]   Current Outpatient Medications:     amLODIPine (Norvasc) 5 mg tablet, TAKE 1 TABLET BY MOUTH ONCE DAILY, Disp: 90 tablet, Rfl: 3    atorvastatin (Lipitor) 80 mg tablet, Take 1 tablet (80 mg) by mouth once daily at bedtime., Disp: 30 tablet, Rfl: 11    clopidogrel (Plavix) 75 mg tablet, Take 1 tablet (75 mg) by mouth once daily., Disp: 90 tablet, Rfl: 3    diphenhydramine HCl (BENADRYL ALLERGY ORAL), Take 1 tablet by mouth as needed at bedtime., Disp: , Rfl:     ferrous sulfate 325 (65 Fe) mg EC tablet, Take 1 tablet by mouth every other day. Do not crush, chew, or split., Disp: 15 tablet,  Rfl: 11    FLUoxetine (PROzac) 20 mg capsule, Take 1 capsule (20 mg) by mouth once daily., Disp: 30 capsule, Rfl: 1    guaiFENesin (Mucinex) 600 mg 12 hr tablet, Take 2 tablets (1,200 mg) by mouth 2 times a day. Do not crush, chew, or split. (Patient taking differently: Take 2 tablets (1,200 mg) by mouth if needed. Do not crush, chew, or split.), Disp: 120 tablet, Rfl: 2    losartan (Cozaar) 100 mg tablet, Take 1 tablet (100 mg) by mouth once daily., Disp: 30 tablet, Rfl: 11    multivitamin tablet, Take 1 tablet by mouth once daily., Disp: 30 tablet, Rfl: 11    nicotine (Nicoderm CQ) 14 mg/24 hr patch, Place 1 patch over 24 hours on the skin once every 24 hours., Disp: 28 patch, Rfl: 1    nicotine polacrilex (Nicorette) 2 mg gum, Chew 1 each (2 mg) if needed for smoking cessation., Disp: 100 each, Rfl: 2    pantoprazole (ProtoNix) 40 mg EC tablet, Take 1 tablet (40 mg) by mouth once daily. Do not crush, chew, or split., Disp: 90 tablet, Rfl: 1    thiamine 100 mg tablet, Take 1 tablet (100 mg) by mouth once daily., Disp: 30 tablet, Rfl: 11  [2]   Allergies  Allergen Reactions    Aspirin Other     Per provider, patient gets upset stomach when taking aspirin    Latex Hives and Itching

## 2025-06-11 NOTE — PATIENT INSTRUCTIONS
As discussed today, our plan is:     Medication changes - None  Be sure to follow up with cardiologist in May  We are collecting blood work today to monitor your blood counts and iron levels, we will call you with the results and let you know if we can stop your iron pill  Work on decreasing your tobacco and alcohol use by your next visit with us!   Make sure to get your mammogram and CAT scan done in August or soon after this year.    Please come back to see me in: 6 months  ------  If you have any problems or questions, please contact the clinic at [phone] to leave a message. Our fax number is [phone]. If your issue cannot wait until the next business day, please go to urgent care or the emergency department.     I also strongly urge all of my patients to register for [facility] by going to: [email or web address]  (The  staff can also send you a text/email link to register when you check out).    No shows: It is understandable if you are unable to make it to a visit, but please cancel your appointment instead of not showing up. This helps to give other patients access to primary care and keeps wait times low.      Dr. Marco Antonio Downing

## 2025-06-14 LAB
BASOPHILS # BLD AUTO: 39 CELLS/UL (ref 0–200)
BASOPHILS NFR BLD AUTO: 0.7 %
CREAT UR-MCNC: NORMAL MG/DL
EOSINOPHIL # BLD AUTO: 112 CELLS/UL (ref 15–500)
EOSINOPHIL NFR BLD AUTO: 2 %
ERYTHROCYTE [DISTWIDTH] IN BLOOD BY AUTOMATED COUNT: 16.9 % (ref 11–15)
FERRITIN SERPL-MCNC: 48 NG/ML (ref 16–288)
HCT VFR BLD AUTO: 38.7 % (ref 35–45)
HGB BLD-MCNC: 12.2 G/DL (ref 11.7–15.5)
IRON SATN MFR SERPL: 33 % (CALC) (ref 16–45)
IRON SERPL-MCNC: 111 MCG/DL (ref 45–160)
LYMPHOCYTES # BLD AUTO: 1915 CELLS/UL (ref 850–3900)
LYMPHOCYTES NFR BLD AUTO: 34.2 %
MCH RBC QN AUTO: 29 PG (ref 27–33)
MCHC RBC AUTO-ENTMCNC: 31.5 G/DL (ref 32–36)
MCV RBC AUTO: 91.9 FL (ref 80–100)
MICROALBUMIN UR-MCNC: NORMAL MG/DL
MONOCYTES # BLD AUTO: 745 CELLS/UL (ref 200–950)
MONOCYTES NFR BLD AUTO: 13.3 %
NEUTROPHILS # BLD AUTO: 2789 CELLS/UL (ref 1500–7800)
NEUTROPHILS NFR BLD AUTO: 49.8 %
PLATELET # BLD AUTO: 344 THOUSAND/UL (ref 140–400)
PMV BLD REES-ECKER: 10.2 FL (ref 7.5–12.5)
RBC # BLD AUTO: 4.21 MILLION/UL (ref 3.8–5.1)
TIBC SERPL-MCNC: 338 MCG/DL (CALC) (ref 250–450)
WBC # BLD AUTO: 5.6 THOUSAND/UL (ref 3.8–10.8)

## 2025-06-19 ENCOUNTER — PHARMACY VISIT (OUTPATIENT)
Dept: PHARMACY | Facility: CLINIC | Age: 70
End: 2025-06-19
Payer: COMMERCIAL

## 2025-06-19 PROCEDURE — RXMED WILLOW AMBULATORY MEDICATION CHARGE

## 2025-06-25 ENCOUNTER — PHARMACY VISIT (OUTPATIENT)
Dept: PHARMACY | Facility: CLINIC | Age: 70
End: 2025-06-25
Payer: COMMERCIAL

## 2025-06-25 PROCEDURE — RXMED WILLOW AMBULATORY MEDICATION CHARGE

## 2025-07-05 PROCEDURE — RXMED WILLOW AMBULATORY MEDICATION CHARGE

## 2025-07-06 ENCOUNTER — PHARMACY VISIT (OUTPATIENT)
Dept: PHARMACY | Facility: CLINIC | Age: 70
End: 2025-07-06
Payer: COMMERCIAL

## 2025-07-15 PROCEDURE — RXMED WILLOW AMBULATORY MEDICATION CHARGE

## 2025-07-17 ENCOUNTER — PHARMACY VISIT (OUTPATIENT)
Dept: PHARMACY | Facility: CLINIC | Age: 70
End: 2025-07-17
Payer: COMMERCIAL

## 2025-07-22 PROCEDURE — RXMED WILLOW AMBULATORY MEDICATION CHARGE

## 2025-07-27 DIAGNOSIS — F41.9 ANXIETY: ICD-10-CM

## 2025-07-28 ENCOUNTER — PHARMACY VISIT (OUTPATIENT)
Dept: PHARMACY | Facility: CLINIC | Age: 70
End: 2025-07-28
Payer: COMMERCIAL

## 2025-07-28 PROCEDURE — RXMED WILLOW AMBULATORY MEDICATION CHARGE

## 2025-07-28 RX ORDER — FLUOXETINE 20 MG/1
20 CAPSULE ORAL DAILY
Qty: 30 CAPSULE | Refills: 3 | Status: SHIPPED | OUTPATIENT
Start: 2025-07-28 | End: 2025-11-25

## 2025-08-02 PROCEDURE — RXMED WILLOW AMBULATORY MEDICATION CHARGE

## 2025-08-11 ENCOUNTER — PHARMACY VISIT (OUTPATIENT)
Dept: PHARMACY | Facility: CLINIC | Age: 70
End: 2025-08-11
Payer: COMMERCIAL

## 2025-08-11 PROCEDURE — RXMED WILLOW AMBULATORY MEDICATION CHARGE

## 2025-08-21 PROCEDURE — RXMED WILLOW AMBULATORY MEDICATION CHARGE

## 2025-08-26 ENCOUNTER — PHARMACY VISIT (OUTPATIENT)
Dept: PHARMACY | Facility: CLINIC | Age: 70
End: 2025-08-26
Payer: COMMERCIAL

## 2025-09-16 ENCOUNTER — APPOINTMENT (OUTPATIENT)
Dept: CARDIOLOGY | Facility: HOSPITAL | Age: 70
End: 2025-09-16
Payer: MEDICARE

## 2026-06-08 ENCOUNTER — APPOINTMENT (OUTPATIENT)
Dept: OPHTHALMOLOGY | Facility: CLINIC | Age: 71
End: 2026-06-08
Payer: MEDICARE

## (undated) DEVICE — GUIDEWIRE, RUN THROUGH WIRE, 180CM

## (undated) DEVICE — SURVIVAL KIT, EVEREST 30

## (undated) DEVICE — CATHETER, BALLOON DILATION, EUPHORA SEMICOMPLIANT 2.50  X 12 MM X 142CM

## (undated) DEVICE — CATHETER, GUIDING, LAUNCHER, 6FR, JR 4.0

## (undated) DEVICE — SHEATH, BRITE TIP 7 X 23

## (undated) DEVICE — ACCESS KIT, S-MAK MINI, 4FR 10CM 0.018IN 40CM, NT/PT, ECHO ENHANCE NEEDLE

## (undated) DEVICE — CATHETER, ANGIO, IMPULSE, FL4, 6 FR X 100 CM

## (undated) DEVICE — CATHETER, BALLOON, MONORAIL, NC EMERGE, PTCA, 12MM X 3.50MM

## (undated) DEVICE — PAD, ELECTRODE DEFIB PADPRO ADULT STRL W/ADAPTER

## (undated) DEVICE — DEVICE, CLOSURE, PERCLOSE, PROSTYLE

## (undated) DEVICE — GUIDEWIRE, INQWIRE, 3MM J, .035, 150

## (undated) DEVICE — BANDAGE, HEMOSTATIC, D-STAT DRY, CLEAR